# Patient Record
Sex: FEMALE | Race: WHITE | NOT HISPANIC OR LATINO | Employment: OTHER | URBAN - NONMETROPOLITAN AREA
[De-identification: names, ages, dates, MRNs, and addresses within clinical notes are randomized per-mention and may not be internally consistent; named-entity substitution may affect disease eponyms.]

---

## 2023-03-13 LAB
C REACTIVE PROTEIN (MG/L) IN SER/PLAS: 1.32 MG/DL
SEDIMENTATION RATE, ERYTHROCYTE: 27 MM/H (ref 0–30)

## 2023-04-06 ENCOUNTER — OFFICE VISIT (OUTPATIENT)
Dept: PRIMARY CARE | Facility: CLINIC | Age: 73
End: 2023-04-06
Payer: MEDICARE

## 2023-04-06 VITALS
OXYGEN SATURATION: 94 % | BODY MASS INDEX: 36.26 KG/M2 | WEIGHT: 225.6 LBS | RESPIRATION RATE: 18 BRPM | HEIGHT: 66 IN | TEMPERATURE: 97.2 F | HEART RATE: 82 BPM

## 2023-04-06 DIAGNOSIS — R05.1 ACUTE COUGH: Primary | ICD-10-CM

## 2023-04-06 PROCEDURE — 1157F ADVNC CARE PLAN IN RCRD: CPT | Performed by: NURSE PRACTITIONER

## 2023-04-06 PROCEDURE — 1159F MED LIST DOCD IN RCRD: CPT | Performed by: NURSE PRACTITIONER

## 2023-04-06 PROCEDURE — 1036F TOBACCO NON-USER: CPT | Performed by: NURSE PRACTITIONER

## 2023-04-06 PROCEDURE — 3008F BODY MASS INDEX DOCD: CPT | Performed by: NURSE PRACTITIONER

## 2023-04-06 PROCEDURE — 99213 OFFICE O/P EST LOW 20 MIN: CPT | Performed by: NURSE PRACTITIONER

## 2023-04-06 RX ORDER — GABAPENTIN 100 MG/1
300 CAPSULE ORAL 2 TIMES DAILY
COMMUNITY
Start: 2022-03-16 | End: 2023-06-26 | Stop reason: SDUPTHER

## 2023-04-06 RX ORDER — LEVOTHYROXINE SODIUM 100 UG/1
100 TABLET ORAL
COMMUNITY
End: 2023-06-26 | Stop reason: SDUPTHER

## 2023-04-06 RX ORDER — METFORMIN HYDROCHLORIDE 1000 MG/1
1000 TABLET ORAL
COMMUNITY
End: 2023-06-26 | Stop reason: SDUPTHER

## 2023-04-06 RX ORDER — BIOTIN 10 MG
1 TABLET ORAL DAILY
COMMUNITY
Start: 2017-08-14

## 2023-04-06 RX ORDER — SACUBITRIL AND VALSARTAN 24; 26 MG/1; MG/1
1 TABLET, FILM COATED ORAL 2 TIMES DAILY
COMMUNITY
End: 2023-06-26 | Stop reason: SDUPTHER

## 2023-04-06 RX ORDER — EMPAGLIFLOZIN 10 MG/1
10 TABLET, FILM COATED ORAL DAILY
COMMUNITY
End: 2023-06-26 | Stop reason: SDUPTHER

## 2023-04-06 RX ORDER — CYANOCOBALAMIN (VITAMIN B-12) 250 MCG
250 TABLET ORAL DAILY
COMMUNITY

## 2023-04-06 RX ORDER — ATORVASTATIN CALCIUM 40 MG/1
40 TABLET, FILM COATED ORAL
COMMUNITY
End: 2023-06-26 | Stop reason: SDUPTHER

## 2023-04-06 RX ORDER — FOLIC ACID 1 MG/1
1 TABLET ORAL DAILY
COMMUNITY
Start: 2018-12-13

## 2023-04-06 RX ORDER — FUROSEMIDE 20 MG/1
20 TABLET ORAL DAILY
COMMUNITY
End: 2023-10-10 | Stop reason: SDUPTHER

## 2023-04-06 RX ORDER — MELOXICAM 7.5 MG/1
1 TABLET ORAL DAILY
COMMUNITY
Start: 2021-08-24 | End: 2023-06-26 | Stop reason: SDUPTHER

## 2023-04-06 RX ORDER — PANTOPRAZOLE SODIUM 40 MG/1
40 TABLET, DELAYED RELEASE ORAL
COMMUNITY
End: 2023-06-26 | Stop reason: SDUPTHER

## 2023-04-06 RX ORDER — METOPROLOL SUCCINATE 100 MG/1
TABLET, EXTENDED RELEASE ORAL
COMMUNITY
Start: 2021-08-24 | End: 2023-06-26 | Stop reason: SDUPTHER

## 2023-04-06 RX ORDER — MULTIVITAMIN
1 TABLET ORAL DAILY
COMMUNITY
Start: 2021-06-21 | End: 2023-11-02 | Stop reason: WASHOUT

## 2023-04-06 RX ORDER — BUSPIRONE HYDROCHLORIDE 7.5 MG/1
TABLET ORAL 3 TIMES DAILY PRN
COMMUNITY
Start: 2016-10-14 | End: 2023-06-26 | Stop reason: SDUPTHER

## 2023-04-06 RX ORDER — ACETAMINOPHEN 500 MG
TABLET ORAL
COMMUNITY
End: 2023-11-02 | Stop reason: WASHOUT

## 2023-04-06 RX ORDER — HYDROXYCHLOROQUINE SULFATE 200 MG/1
TABLET, FILM COATED ORAL 2 TIMES DAILY
COMMUNITY
Start: 2015-12-21 | End: 2023-06-26 | Stop reason: SDUPTHER

## 2023-04-06 RX ORDER — ESCITALOPRAM OXALATE 20 MG/1
TABLET ORAL
COMMUNITY
Start: 2013-10-09 | End: 2023-04-24

## 2023-04-06 RX ORDER — METHOCARBAMOL 500 MG/1
TABLET, FILM COATED ORAL
COMMUNITY
End: 2023-11-02 | Stop reason: WASHOUT

## 2023-04-06 RX ORDER — ALBUTEROL SULFATE 90 UG/1
1 AEROSOL, METERED RESPIRATORY (INHALATION) EVERY 4 HOURS PRN
COMMUNITY
Start: 2019-12-02

## 2023-04-06 RX ORDER — MINERAL OIL
1 ENEMA (ML) RECTAL DAILY
COMMUNITY

## 2023-04-06 ASSESSMENT — PAIN SCALES - GENERAL: PAINLEVEL: 0-NO PAIN

## 2023-04-06 ASSESSMENT — ENCOUNTER SYMPTOMS: COUGH: 1

## 2023-04-06 NOTE — PROGRESS NOTES
"Subjective   Patient ID: Joana Elizabeth is a 73 y.o. female who presents for Cough.    Jan 17 underwent cervical spine surgery.did very well. Lost voice still hoarse.   Here today because she has a cough that is not going away. Has not been sick. Feels like she has \"fullness, gargling\" Lt mid and lower lobes. Able to cough up phlegm. Cough all has  until choke.  Cough has been present since surgery.   RT knee had infected stitch x 2 years. Dr Ortiz removed surgically.   Albuterol causes increased cough and choking. Had an old inhaler- Symbicort which helped. But it is an old.   ECHO and is SR.     Cough       Review of Systems   Respiratory:  Positive for cough.    All other systems reviewed and are negative.      Objective   Pulse 82   Temp 36.2 °C (97.2 °F)   Resp 18   Ht 1.676 m (5' 6\")   Wt 102 kg (225 lb 9.6 oz)   SpO2 94%   BMI 36.41 kg/m²     Physical Exam  Constitutional:       Appearance: Normal appearance.   Cardiovascular:      Rate and Rhythm: Normal rate and regular rhythm.   Pulmonary:      Breath sounds: Wheezing, rhonchi and rales present.      Comments: Harsh bronchial cough throughout our visit. No distress. Hoarse voice  Musculoskeletal:         General: Normal range of motion.   Neurological:      Mental Status: She is alert and oriented to person, place, and time.   Psychiatric:         Mood and Affect: Mood normal.       Assessment/Plan     Joana was seen today for cough.  Diagnoses and all orders for this visit:  Acute cough (Primary)  Comments:  i wonder if aspirating post op. Get STAT CXR. I will notify you with the result and treatment plan  Orders:  -     XR chest 4+ views; Future        "

## 2023-04-07 DIAGNOSIS — R05.1 ACUTE COUGH: Primary | ICD-10-CM

## 2023-04-07 RX ORDER — BUDESONIDE AND FORMOTEROL FUMARATE DIHYDRATE 80; 4.5 UG/1; UG/1
2 AEROSOL RESPIRATORY (INHALATION)
Qty: 1 EACH | Refills: 3 | Status: SHIPPED | OUTPATIENT
Start: 2023-04-07 | End: 2023-10-10 | Stop reason: ALTCHOICE

## 2023-04-07 NOTE — PROGRESS NOTES
CXR without acute issue. Her cough has been present since jan post cervical spine surgery. Her voice remains hoarse since surgery with slight improvement. I am concerned for vocal cord involvement, this may be temporary but I would like for her to see ENT. I reached out to dr James Resendiz who was able to schedule her for 4/17/23 in his office for eval.

## 2023-04-23 DIAGNOSIS — F32.A DEPRESSION, UNSPECIFIED DEPRESSION TYPE: Primary | ICD-10-CM

## 2023-04-24 RX ORDER — ESCITALOPRAM OXALATE 20 MG/1
TABLET ORAL
Qty: 90 TABLET | Refills: 3 | Status: SHIPPED | OUTPATIENT
Start: 2023-04-24 | End: 2023-06-26 | Stop reason: SDUPTHER

## 2023-06-26 ENCOUNTER — OFFICE VISIT (OUTPATIENT)
Dept: PRIMARY CARE | Facility: CLINIC | Age: 73
End: 2023-06-26
Payer: MEDICARE

## 2023-06-26 VITALS
BODY MASS INDEX: 40.4 KG/M2 | OXYGEN SATURATION: 98 % | HEIGHT: 63 IN | WEIGHT: 228 LBS | DIASTOLIC BLOOD PRESSURE: 84 MMHG | HEART RATE: 52 BPM | SYSTOLIC BLOOD PRESSURE: 128 MMHG | RESPIRATION RATE: 18 BRPM

## 2023-06-26 DIAGNOSIS — K21.9 GERD WITHOUT ESOPHAGITIS: ICD-10-CM

## 2023-06-26 DIAGNOSIS — E78.5 HYPERLIPIDEMIA, UNSPECIFIED HYPERLIPIDEMIA TYPE: ICD-10-CM

## 2023-06-26 DIAGNOSIS — E03.9 HYPOTHYROIDISM, UNSPECIFIED TYPE: ICD-10-CM

## 2023-06-26 DIAGNOSIS — F32.A DEPRESSION, UNSPECIFIED DEPRESSION TYPE: ICD-10-CM

## 2023-06-26 DIAGNOSIS — M47.12 CERVICAL ARTHRITIS WITH MYELOPATHY: ICD-10-CM

## 2023-06-26 DIAGNOSIS — M43.10 ACQUIRED SPONDYLOLISTHESIS: ICD-10-CM

## 2023-06-26 DIAGNOSIS — E11.8 CONTROLLED TYPE 2 DIABETES MELLITUS WITH COMPLICATION, WITHOUT LONG-TERM CURRENT USE OF INSULIN (MULTI): ICD-10-CM

## 2023-06-26 DIAGNOSIS — I15.9 SECONDARY HYPERTENSION: ICD-10-CM

## 2023-06-26 DIAGNOSIS — F41.9 ANXIETY: ICD-10-CM

## 2023-06-26 DIAGNOSIS — Z79.899 ENCOUNTER FOR MEDICATION REVIEW: Primary | ICD-10-CM

## 2023-06-26 PROCEDURE — 1036F TOBACCO NON-USER: CPT | Performed by: NURSE PRACTITIONER

## 2023-06-26 PROCEDURE — 3079F DIAST BP 80-89 MM HG: CPT | Performed by: NURSE PRACTITIONER

## 2023-06-26 PROCEDURE — 1160F RVW MEDS BY RX/DR IN RCRD: CPT | Performed by: NURSE PRACTITIONER

## 2023-06-26 PROCEDURE — 1157F ADVNC CARE PLAN IN RCRD: CPT | Performed by: NURSE PRACTITIONER

## 2023-06-26 PROCEDURE — 3008F BODY MASS INDEX DOCD: CPT | Performed by: NURSE PRACTITIONER

## 2023-06-26 PROCEDURE — 99213 OFFICE O/P EST LOW 20 MIN: CPT | Performed by: NURSE PRACTITIONER

## 2023-06-26 PROCEDURE — 1159F MED LIST DOCD IN RCRD: CPT | Performed by: NURSE PRACTITIONER

## 2023-06-26 PROCEDURE — 3074F SYST BP LT 130 MM HG: CPT | Performed by: NURSE PRACTITIONER

## 2023-06-26 RX ORDER — METFORMIN HYDROCHLORIDE 1000 MG/1
1000 TABLET ORAL
Qty: 180 TABLET | Refills: 0 | Status: SHIPPED | OUTPATIENT
Start: 2023-06-26 | End: 2024-02-19 | Stop reason: SDUPTHER

## 2023-06-26 RX ORDER — ESCITALOPRAM OXALATE 20 MG/1
20 TABLET ORAL DAILY
Qty: 90 TABLET | Refills: 0 | Status: SHIPPED | OUTPATIENT
Start: 2023-06-26 | End: 2024-02-28 | Stop reason: SDUPTHER

## 2023-06-26 RX ORDER — PANTOPRAZOLE SODIUM 40 MG/1
40 TABLET, DELAYED RELEASE ORAL
Qty: 90 TABLET | Refills: 0 | Status: SHIPPED | OUTPATIENT
Start: 2023-06-26 | End: 2023-11-29 | Stop reason: SDUPTHER

## 2023-06-26 RX ORDER — SACUBITRIL AND VALSARTAN 24; 26 MG/1; MG/1
1 TABLET, FILM COATED ORAL 2 TIMES DAILY
Qty: 180 TABLET | Refills: 0 | Status: SHIPPED | OUTPATIENT
Start: 2023-06-26

## 2023-06-26 RX ORDER — HYDROXYCHLOROQUINE SULFATE 200 MG/1
200 TABLET, FILM COATED ORAL 2 TIMES DAILY
Qty: 180 TABLET | Refills: 0 | Status: SHIPPED | OUTPATIENT
Start: 2023-06-26 | End: 2023-12-11 | Stop reason: SDUPTHER

## 2023-06-26 RX ORDER — ATORVASTATIN CALCIUM 40 MG/1
40 TABLET, FILM COATED ORAL
Qty: 90 TABLET | Refills: 0 | Status: SHIPPED | OUTPATIENT
Start: 2023-06-26 | End: 2024-02-28 | Stop reason: SDUPTHER

## 2023-06-26 RX ORDER — METOPROLOL SUCCINATE 100 MG/1
100 TABLET, EXTENDED RELEASE ORAL DAILY
Qty: 90 TABLET | Refills: 0 | Status: SHIPPED | OUTPATIENT
Start: 2023-06-26 | End: 2023-12-11 | Stop reason: SDUPTHER

## 2023-06-26 RX ORDER — EMPAGLIFLOZIN 10 MG/1
10 TABLET, FILM COATED ORAL DAILY
Qty: 90 TABLET | Refills: 0 | Status: SHIPPED | OUTPATIENT
Start: 2023-06-26

## 2023-06-26 RX ORDER — MELOXICAM 7.5 MG/1
7.5 TABLET ORAL DAILY
Qty: 90 TABLET | Refills: 0 | Status: SHIPPED | OUTPATIENT
Start: 2023-06-26 | End: 2023-10-30 | Stop reason: SDUPTHER

## 2023-06-26 RX ORDER — GABAPENTIN 100 MG/1
300 CAPSULE ORAL 2 TIMES DAILY
Qty: 180 CAPSULE | Refills: 0 | Status: SHIPPED | OUTPATIENT
Start: 2023-06-26 | End: 2023-11-06 | Stop reason: DRUGHIGH

## 2023-06-26 RX ORDER — LEVOTHYROXINE SODIUM 100 UG/1
100 TABLET ORAL
Qty: 90 TABLET | Refills: 0 | Status: SHIPPED | OUTPATIENT
Start: 2023-06-26 | End: 2023-11-29 | Stop reason: SDUPTHER

## 2023-06-26 RX ORDER — BUSPIRONE HYDROCHLORIDE 7.5 MG/1
7.5 TABLET ORAL 3 TIMES DAILY PRN
Qty: 270 TABLET | Refills: 0 | Status: SHIPPED | OUTPATIENT
Start: 2023-06-26 | End: 2023-10-10 | Stop reason: ALTCHOICE

## 2023-06-26 NOTE — PROGRESS NOTES
"Subjective   Patient ID: Joana Elizabeth is a 73 y.o. female who presents for Med Refill.    Received the letter from [facility] that I was \"leaving \" and she wanted ot discuss. I explained this office is permanently closing and I have chosen to take a position outside of . She is upset and does not know where to go  Need refills and cannot tell me which one. She had a written list that we both went over.   Some go to [facility] others go to [facility].   Asking who she should see as a PCP.  I gave her some names of PCP through   Also updated me that she is in the process of getting an MRI of cervical spine and LT shoulder by Dr Shrestha and Dr Hawkins. She is having worsening chronic pain LT shoulder and numbness in the hands.   Occasional pain RT lateral side/flank starts in spine and radiates to the front of the abdomen. Is right handed. Does not use the LT UE nasra due to pain. Wondering if she \"pulled a muscle\". We also discussed her arthritis-inflammatory which can cause these S&S    Med Refill         Review of Systems   All other systems reviewed and are negative.      Objective   /84   Pulse 52   Resp 18   Ht 1.6 m (5' 3\")   Wt 103 kg (228 lb)   SpO2 98%   BMI 40.39 kg/m²     Physical Exam  Constitutional:       Appearance: She is obese.   Cardiovascular:      Rate and Rhythm: Normal rate.   Pulmonary:      Effort: Pulmonary effort is normal.      Breath sounds: Normal breath sounds.   Abdominal:      General: Bowel sounds are normal.      Palpations: Abdomen is soft.   Musculoskeletal:      Comments: Decreased ROM LT UE cannot raise arm above the head.    Neurological:      Mental Status: She is alert and oriented to person, place, and time.   Psychiatric:         Mood and Affect: Mood normal.         Behavior: Behavior normal.         Assessment/Plan     Joana was seen today for med refill.  Diagnoses and all orders for this visit:  Encounter for medication review (Primary)  Comments:  all meds " refilled 90 days. She will call to schedule with a new PCP.  Depression, unspecified depression type  -     escitalopram (Lexapro) 20 mg tablet; Take 1 tablet (20 mg) by mouth once daily.  Acquired spondylolisthesis  -     gabapentin (Neurontin) 100 mg capsule; Take 3 capsules (300 mg) by mouth 2 times a day.  -     meloxicam (Mobic) 7.5 mg tablet; Take 1 tablet (7.5 mg) by mouth once daily.  -     hydroxychloroquine (Plaquenil) 200 mg tablet; Take 1 tablet (200 mg) by mouth 2 times a day.  Hyperlipidemia, unspecified hyperlipidemia type  -     atorvastatin (Lipitor) 40 mg tablet; Take 1 tablet (40 mg) by mouth once daily.  -     Entresto 24-26 mg tablet; Take 1 tablet by mouth 2 times a day.  Anxiety  -     busPIRone (Buspar) 7.5 mg tablet; Take 1 tablet (7.5 mg) by mouth 3 times a day as needed (270).  Hypothyroidism, unspecified type  -     levothyroxine (Synthroid, Levoxyl) 100 mcg tablet; Take 1 tablet (100 mcg) by mouth once daily.  Controlled type 2 diabetes mellitus with complication, without long-term current use of insulin (CMS/[facility])  -     metFORMIN (Glucophage) 1,000 mg tablet; Take 1 tablet (1,000 mg) by mouth 2 times a day with meals.  -     Jardiance 10 mg; Take 1 tablet (10 mg) by mouth once daily.  GERD without esophagitis  -     pantoprazole (ProtoNix) 40 mg EC tablet; Take 1 tablet (40 mg) by mouth once daily.  Secondary hypertension  -     metoprolol succinate XL (Toprol-XL) 100 mg 24 hr tablet; Take 1 tablet (100 mg) by mouth once daily.  -     Entresto 24-26 mg tablet; Take 1 tablet by mouth 2 times a day.  Cervical arthritis with myelopathy  -     gabapentin (Neurontin) 100 mg capsule; Take 3 capsules (300 mg) by mouth 2 times a day.  -     meloxicam (Mobic) 7.5 mg tablet; Take 1 tablet (7.5 mg) by mouth once daily.  -     hydroxychloroquine (Plaquenil) 200 mg tablet; Take 1 tablet (200 mg) by mouth 2 times a day.

## 2023-08-17 ENCOUNTER — HOSPITAL ENCOUNTER (OUTPATIENT)
Dept: DATA CONVERSION | Facility: HOSPITAL | Age: 73
End: 2023-08-17
Attending: ORTHOPAEDIC SURGERY
Payer: MEDICARE

## 2023-08-17 DIAGNOSIS — S43.492A OTHER SPRAIN OF LEFT SHOULDER JOINT, INITIAL ENCOUNTER: ICD-10-CM

## 2023-08-17 DIAGNOSIS — M19.012 PRIMARY OSTEOARTHRITIS, LEFT SHOULDER: ICD-10-CM

## 2023-08-17 DIAGNOSIS — M75.102 UNSPECIFIED ROTATOR CUFF TEAR OR RUPTURE OF LEFT SHOULDER, NOT SPECIFIED AS TRAUMATIC: ICD-10-CM

## 2023-09-20 PROBLEM — I10 BENIGN ESSENTIAL HTN: Status: ACTIVE | Noted: 2023-09-20

## 2023-09-20 PROBLEM — M05.20 RHEUMATOID ARTERITIS (MULTI): Status: ACTIVE | Noted: 2017-03-22

## 2023-09-20 PROBLEM — I49.3 PVC (PREMATURE VENTRICULAR CONTRACTION): Status: ACTIVE | Noted: 2023-09-20

## 2023-09-20 PROBLEM — I42.9 CARDIOMYOPATHY (MULTI): Status: ACTIVE | Noted: 2023-09-20

## 2023-09-20 PROBLEM — E55.9 MILD VITAMIN D DEFICIENCY: Status: ACTIVE | Noted: 2023-09-20

## 2023-09-20 PROBLEM — J38.00 VOCAL CORD WEAKNESS: Status: ACTIVE | Noted: 2023-09-20

## 2023-09-20 PROBLEM — E11.9 TYPE 2 DIABETES MELLITUS (MULTI): Status: ACTIVE | Noted: 2023-09-20

## 2023-09-20 PROBLEM — K21.00 GERD WITH ESOPHAGITIS: Status: ACTIVE | Noted: 2023-09-20

## 2023-09-20 PROBLEM — M79.7 FIBROMYALGIA: Status: ACTIVE | Noted: 2023-09-20

## 2023-09-20 PROBLEM — H18.833 RECURRENT EROSION OF CORNEA, BILATERAL: Status: ACTIVE | Noted: 2019-11-25

## 2023-09-20 PROBLEM — H43.811 VITREOUS DEGENERATION, RIGHT EYE: Status: ACTIVE | Noted: 2018-01-30

## 2023-09-20 PROBLEM — M26.649 TMJ ARTHRITIS: Status: ACTIVE | Noted: 2023-09-20

## 2023-09-20 PROBLEM — H65.90 MIDDLE EAR EFFUSION: Status: ACTIVE | Noted: 2023-09-20

## 2023-09-20 PROBLEM — Z98.890 STATUS POST LUMBAR SPINE SURGERY FOR DECOMPRESSION OF SPINAL CORD: Status: ACTIVE | Noted: 2018-11-06

## 2023-09-20 PROBLEM — M47.816 LUMBAR SPONDYLOSIS: Status: ACTIVE | Noted: 2018-09-28

## 2023-09-20 PROBLEM — I82.409 DEEP VENOUS THROMBOSIS (MULTI): Status: ACTIVE | Noted: 2023-09-20

## 2023-09-20 PROBLEM — I51.9 LV DYSFUNCTION: Status: ACTIVE | Noted: 2023-09-20

## 2023-09-20 PROBLEM — H52.4 PRESBYOPIA: Status: ACTIVE | Noted: 2019-11-25

## 2023-09-20 PROBLEM — M79.10 MYALGIA: Status: ACTIVE | Noted: 2023-09-20

## 2023-09-20 PROBLEM — J45.909 ASTHMA (HHS-HCC): Status: ACTIVE | Noted: 2023-09-20

## 2023-09-20 PROBLEM — G89.29 CHRONIC PAIN: Status: ACTIVE | Noted: 2023-09-20

## 2023-09-20 PROBLEM — G47.33 OSA ON CPAP: Status: ACTIVE | Noted: 2023-09-20

## 2023-09-20 PROBLEM — M17.0 OSTEOARTHRITIS OF BOTH KNEES: Status: ACTIVE | Noted: 2023-09-20

## 2023-09-20 PROBLEM — M06.4 UNDIFFERENTIATED INFLAMMATORY ARTHRITIS (MULTI): Status: ACTIVE | Noted: 2023-09-20

## 2023-09-20 PROBLEM — H57.03: Status: ACTIVE | Noted: 2023-09-20

## 2023-09-20 PROBLEM — M48.02 CERVICAL STENOSIS OF SPINE: Status: ACTIVE | Noted: 2023-09-20

## 2023-09-20 PROBLEM — R07.9 CHEST PAIN: Status: ACTIVE | Noted: 2017-01-04

## 2023-09-20 PROBLEM — R06.02 EXERTIONAL SHORTNESS OF BREATH: Status: ACTIVE | Noted: 2023-09-20

## 2023-09-20 PROBLEM — Z95.810 ICD (IMPLANTABLE CARDIOVERTER-DEFIBRILLATOR) IN PLACE: Status: ACTIVE | Noted: 2023-09-20

## 2023-09-20 PROBLEM — R49.0 HOARSENESS: Status: ACTIVE | Noted: 2023-09-20

## 2023-09-20 PROBLEM — G95.20 COMPRESSION OF SPINAL CORD (MULTI): Status: ACTIVE | Noted: 2023-09-20

## 2023-09-20 PROBLEM — J40 BRONCHITIS: Status: ACTIVE | Noted: 2023-09-20

## 2023-09-20 PROBLEM — M19.90 UNDIFFERENTIATED INFLAMMATORY ARTHRITIS: Status: ACTIVE | Noted: 2023-09-20

## 2023-09-20 RX ORDER — CIPROFLOXACIN HYDROCHLORIDE 3 MG/ML
SOLUTION/ DROPS OPHTHALMIC
COMMUNITY
End: 2023-11-02 | Stop reason: WASHOUT

## 2023-09-20 RX ORDER — PRENATAL VIT CALC,IRON,FOLIC
1 TABLET ORAL DAILY
COMMUNITY
Start: 2017-08-14 | End: 2023-10-10 | Stop reason: ALTCHOICE

## 2023-09-20 RX ORDER — METOPROLOL SUCCINATE 50 MG/1
1 TABLET, EXTENDED RELEASE ORAL DAILY
COMMUNITY
End: 2023-11-02 | Stop reason: WASHOUT

## 2023-09-20 RX ORDER — ALBUTEROL SULFATE 90 UG/1
2 AEROSOL, METERED RESPIRATORY (INHALATION)
COMMUNITY
End: 2023-11-02 | Stop reason: WASHOUT

## 2023-09-20 RX ORDER — PHENYLEPHRINE HCL 10 MG/1
10 TABLET, FILM COATED ORAL 2 TIMES DAILY PRN
COMMUNITY
End: 2023-11-02 | Stop reason: WASHOUT

## 2023-09-20 RX ORDER — ACETAMINOPHEN 160 MG/5ML
1 SUSPENSION, ORAL (FINAL DOSE FORM) ORAL DAILY
COMMUNITY
Start: 2022-09-19

## 2023-09-20 RX ORDER — VITAMIN B COMPLEX
1 CAPSULE ORAL
COMMUNITY
End: 2023-10-10 | Stop reason: ALTCHOICE

## 2023-09-20 RX ORDER — ONDANSETRON 4 MG/1
4 TABLET, FILM COATED ORAL EVERY 8 HOURS PRN
COMMUNITY
Start: 2021-08-18 | End: 2023-11-02 | Stop reason: WASHOUT

## 2023-09-20 RX ORDER — NAPROXEN 500 MG/1
500 TABLET ORAL
COMMUNITY
Start: 2014-04-27 | End: 2023-11-02 | Stop reason: WASHOUT

## 2023-09-20 RX ORDER — OXYCODONE HYDROCHLORIDE 5 MG/1
TABLET ORAL
COMMUNITY
Start: 2023-01-19 | End: 2023-11-02 | Stop reason: WASHOUT

## 2023-09-20 RX ORDER — FEXOFENADINE HCL 60 MG
60 TABLET ORAL DAILY
COMMUNITY
Start: 2010-08-23 | End: 2023-11-02 | Stop reason: WASHOUT

## 2023-09-20 RX ORDER — CLOTRIMAZOLE AND BETAMETHASONE DIPROPIONATE 10; .64 MG/G; MG/G
CREAM TOPICAL
COMMUNITY
End: 2023-11-02 | Stop reason: WASHOUT

## 2023-09-20 RX ORDER — FERROUS SULFATE 325(65) MG
65 TABLET ORAL 2 TIMES DAILY
COMMUNITY

## 2023-09-20 RX ORDER — FLUTICASONE PROPIONATE 50 MCG
1 SPRAY, SUSPENSION (ML) NASAL DAILY
COMMUNITY

## 2023-09-20 RX ORDER — CEFUROXIME AXETIL 500 MG/1
1 TABLET ORAL 2 TIMES DAILY
COMMUNITY
Start: 2017-12-22 | End: 2023-10-10 | Stop reason: ALTCHOICE

## 2023-09-20 RX ORDER — TOBRAMYCIN AND DEXAMETHASONE 3; 1 MG/ML; MG/ML
SUSPENSION/ DROPS OPHTHALMIC
COMMUNITY
End: 2023-11-02 | Stop reason: WASHOUT

## 2023-09-20 RX ORDER — CHLORHEXIDINE GLUCONATE 40 MG/ML
SOLUTION TOPICAL
COMMUNITY
Start: 2023-01-05 | End: 2023-11-02 | Stop reason: WASHOUT

## 2023-09-20 RX ORDER — NAPROXEN SODIUM 220 MG/1
1 TABLET, FILM COATED ORAL DAILY
COMMUNITY
Start: 2021-08-03 | End: 2023-10-19 | Stop reason: SDUPTHER

## 2023-09-20 RX ORDER — ACETAMINOPHEN 500 MG
TABLET ORAL
COMMUNITY

## 2023-09-20 RX ORDER — CHLORHEXIDINE GLUCONATE ORAL RINSE 1.2 MG/ML
SOLUTION DENTAL
COMMUNITY
Start: 2023-01-05 | End: 2023-11-02 | Stop reason: WASHOUT

## 2023-09-20 RX ORDER — ERGOCALCIFEROL 1.25 MG/1
50000 CAPSULE ORAL WEEKLY
COMMUNITY
End: 2023-11-02 | Stop reason: WASHOUT

## 2023-09-20 RX ORDER — AZELASTINE HYDROCHLORIDE 0.5 MG/ML
1 SOLUTION/ DROPS OPHTHALMIC 2 TIMES DAILY
COMMUNITY
Start: 2018-09-04 | End: 2023-10-10 | Stop reason: ALTCHOICE

## 2023-09-20 RX ORDER — CALCIUM CARBONATE 300MG(750)
2 TABLET,CHEWABLE ORAL DAILY
COMMUNITY
Start: 2022-09-19

## 2023-09-20 RX ORDER — METHOCARBAMOL 500 MG/1
500 TABLET, FILM COATED ORAL 3 TIMES DAILY
COMMUNITY
End: 2023-11-15 | Stop reason: HOSPADM

## 2023-09-20 RX ORDER — HYDROCODONE BITARTRATE AND ACETAMINOPHEN 5; 325 MG/1; MG/1
1 TABLET ORAL EVERY 6 HOURS PRN
COMMUNITY
End: 2023-11-02 | Stop reason: WASHOUT

## 2023-09-20 RX ORDER — BIOTIN 5 MG
TABLET ORAL
COMMUNITY
End: 2023-11-02 | Stop reason: WASHOUT

## 2023-09-20 RX ORDER — METOPROLOL SUCCINATE 25 MG/1
25 TABLET, EXTENDED RELEASE ORAL
COMMUNITY
Start: 2021-08-24 | End: 2023-11-02 | Stop reason: WASHOUT

## 2023-09-20 RX ORDER — MECOBALAMIN 5000 MCG
5000 LOZENGE ORAL DAILY
COMMUNITY
End: 2023-11-02 | Stop reason: WASHOUT

## 2023-09-20 RX ORDER — ACETAMINOPHEN 325 MG/1
650 TABLET ORAL EVERY 6 HOURS
COMMUNITY
End: 2023-11-14 | Stop reason: WASHOUT

## 2023-09-20 RX ORDER — PRAVASTATIN SODIUM 40 MG/1
40 TABLET ORAL DAILY
COMMUNITY
End: 2023-11-02 | Stop reason: ALTCHOICE

## 2023-09-20 RX ORDER — PHENOL 1.4 %
1 AEROSOL, SPRAY (ML) MUCOUS MEMBRANE DAILY
COMMUNITY
End: 2023-11-02 | Stop reason: WASHOUT

## 2023-09-20 RX ORDER — DIAPER,BRIEF,ADULT, DISPOSABLE
EACH MISCELLANEOUS
COMMUNITY
Start: 2014-07-02

## 2023-09-20 RX ORDER — PREDNISOLONE ACETATE 10 MG/ML
SUSPENSION/ DROPS OPHTHALMIC
COMMUNITY
End: 2023-11-02 | Stop reason: WASHOUT

## 2023-09-20 RX ORDER — LINAGLIPTIN 5 MG/1
TABLET, FILM COATED ORAL
COMMUNITY
Start: 2018-09-10 | End: 2023-11-02 | Stop reason: WASHOUT

## 2023-09-20 RX ORDER — CALCIUM CARBONATE/VITAMIN D3 600MG-5MCG
TABLET ORAL
COMMUNITY
End: 2023-10-10 | Stop reason: ALTCHOICE

## 2023-09-20 RX ORDER — ESCITALOPRAM OXALATE 10 MG/1
10 TABLET ORAL NIGHTLY
COMMUNITY
Start: 2010-08-23 | End: 2023-10-10 | Stop reason: ALTCHOICE

## 2023-09-20 RX ORDER — FLUOCINOLONE ACETONIDE 0.1 MG/G
CREAM TOPICAL
COMMUNITY
End: 2023-11-02 | Stop reason: WASHOUT

## 2023-09-20 RX ORDER — DICLOFENAC SODIUM AND MISOPROSTOL 75; 200 MG/1; UG/1
1 TABLET, DELAYED RELEASE ORAL 2 TIMES DAILY
COMMUNITY
End: 2023-11-02 | Stop reason: WASHOUT

## 2023-09-20 RX ORDER — BUSPIRONE HYDROCHLORIDE 7.5 MG/1
7.5 TABLET ORAL DAILY PRN
COMMUNITY
End: 2023-11-14 | Stop reason: ALTCHOICE

## 2023-09-20 RX ORDER — GABAPENTIN 400 MG/1
400 CAPSULE ORAL 3 TIMES DAILY
COMMUNITY
Start: 2018-08-08 | End: 2023-11-02 | Stop reason: WASHOUT

## 2023-09-25 RX ORDER — DOXYCYCLINE 100 MG/1
1 TABLET ORAL 2 TIMES DAILY
COMMUNITY
Start: 2023-03-17 | End: 2023-11-02 | Stop reason: WASHOUT

## 2023-09-25 RX ORDER — POLYETHYLENE GLYCOL 3350 17 G/17G
POWDER, FOR SOLUTION ORAL
COMMUNITY
Start: 2023-01-18 | End: 2023-11-02 | Stop reason: WASHOUT

## 2023-10-03 ENCOUNTER — OFFICE VISIT (OUTPATIENT)
Dept: ORTHOPEDIC SURGERY | Facility: CLINIC | Age: 73
End: 2023-10-03
Payer: MEDICARE

## 2023-10-03 DIAGNOSIS — M25.812 IMPINGEMENT OF LEFT SHOULDER: Primary | ICD-10-CM

## 2023-10-03 PROCEDURE — 3052F HG A1C>EQUAL 8.0%<EQUAL 9.0%: CPT | Performed by: ORTHOPAEDIC SURGERY

## 2023-10-03 PROCEDURE — 99213 OFFICE O/P EST LOW 20 MIN: CPT | Mod: 25 | Performed by: ORTHOPAEDIC SURGERY

## 2023-10-03 PROCEDURE — 20611 DRAIN/INJ JOINT/BURSA W/US: CPT | Performed by: ORTHOPAEDIC SURGERY

## 2023-10-03 PROCEDURE — 1160F RVW MEDS BY RX/DR IN RCRD: CPT | Performed by: ORTHOPAEDIC SURGERY

## 2023-10-03 PROCEDURE — 1036F TOBACCO NON-USER: CPT | Performed by: ORTHOPAEDIC SURGERY

## 2023-10-03 PROCEDURE — 3008F BODY MASS INDEX DOCD: CPT | Performed by: ORTHOPAEDIC SURGERY

## 2023-10-03 PROCEDURE — 1126F AMNT PAIN NOTED NONE PRSNT: CPT | Performed by: ORTHOPAEDIC SURGERY

## 2023-10-03 PROCEDURE — 20611 DRAIN/INJ JOINT/BURSA W/US: CPT | Performed by: PHYSICIAN ASSISTANT

## 2023-10-03 PROCEDURE — 2500000004 HC RX 250 GENERAL PHARMACY W/ HCPCS (ALT 636 FOR OP/ED): Performed by: ORTHOPAEDIC SURGERY

## 2023-10-03 PROCEDURE — 3048F LDL-C <100 MG/DL: CPT | Performed by: ORTHOPAEDIC SURGERY

## 2023-10-03 PROCEDURE — 1159F MED LIST DOCD IN RCRD: CPT | Performed by: ORTHOPAEDIC SURGERY

## 2023-10-03 PROCEDURE — 99213 OFFICE O/P EST LOW 20 MIN: CPT | Performed by: ORTHOPAEDIC SURGERY

## 2023-10-03 RX ORDER — TRIAMCINOLONE ACET/0.9%NACL/PF 40 MG/ML
10 VIAL (ML) INJECTION ONCE
Status: DISCONTINUED | OUTPATIENT
Start: 2023-10-03 | End: 2023-10-03

## 2023-10-03 RX ADMIN — TRIAMCINOLONE ACETONIDE 10 MG: 10 INJECTION, SUSPENSION INTRA-ARTICULAR; INTRALESIONAL at 10:41

## 2023-10-03 ASSESSMENT — PAIN SCALES - GENERAL: PAINLEVEL_OUTOF10: 5 - MODERATE PAIN

## 2023-10-03 ASSESSMENT — PAIN - FUNCTIONAL ASSESSMENT: PAIN_FUNCTIONAL_ASSESSMENT: 0-10

## 2023-10-03 ASSESSMENT — ENCOUNTER SYMPTOMS
APPETITE CHANGE: 0
JOINT SWELLING: 1
COLOR CHANGE: 0
TROUBLE SWALLOWING: 0
EYE DISCHARGE: 0
SHORTNESS OF BREATH: 0
WHEEZING: 0

## 2023-10-03 NOTE — PROGRESS NOTES
Reason for Appointment  Chief Complaint   Patient presents with    Left Shoulder - Pain       History of Present Illness  Patient is a 73 y.o. female here today for follow-up evaluation of recurrent left shoulder pain.  She had a subacromial injection about 6 weeks ago that did give her some relief, she is having recurrent lateral sided pain and she is getting some radicular symptoms down the arm.  She is following up with Dr. Hawkins for her neck, she may have some hardware loosening in her neck and may need revision neck surgery in the coming future.  We have discussed a possible reverse shoulder replacement in the future but she is not interested in this today.  No other changes in her past medical history, allergies, or medications..     Past Medical History:   Diagnosis Date    Abnormal levels of other serum enzymes 12/01/2020    Abnormal AST and ALT    Acquired absence of other specified parts of digestive tract 07/10/2019    Status post laparoscopic cholecystectomy    Anemia, unspecified 12/28/2018    Postoperative anemia    Body mass index (BMI) 36.0-36.9, adult 12/16/2019    BMI 36.0-36.9,adult    Body mass index (BMI) 37.0-37.9, adult 05/30/2019    BMI 37.0-37.9, adult    Calculus of gallbladder without cholecystitis without obstruction 06/26/2019    Gallstones    Cellulitis of face 08/21/2017    Cellulitis of face    Chronic maxillary sinusitis 03/22/2018    Right maxillary sinusitis    Contact with and (suspected) exposure to covid-19 09/08/2020    Suspected COVID-19 virus infection    COVID-19 10/02/2020    COVID-19 virus infection    Disorder of bone, unspecified 01/26/2021    Disorder of bone and cartilage    Dorsalgia, unspecified 10/15/2019    Mid-back pain, acute    Effusion, unspecified hand 10/14/2016    Swelling of hand joint    Elevated erythrocyte sedimentation rate 11/17/2014    Elevated sedimentation rate    Encounter for other preprocedural examination 08/19/2021    Pre-op evaluation     Encounter for screening for malignant neoplasm of cervix 05/04/2016    Cervical cancer screening    Encounter for screening for malignant neoplasm of colon 09/21/2018    Encounter for screening colonoscopy    Encounter for screening for osteoporosis 09/01/2020    Screening for osteoporosis    Epigastric pain 11/17/2014    Dyspepsia    Essential (primary) hypertension 08/19/2021    Benign essential HTN    Fever, unspecified 09/08/2020    Fever with exposure to COVID-19 virus    Gastroparesis 11/19/2019    Gastroparesis    Gastroparesis 12/05/2019    Gastroparesis    History of falling 08/04/2021    Status post fall    Laceration without foreign body of other part of head, initial encounter 03/22/2018    Forehead laceration    Left lower quadrant abdominal tenderness 06/11/2018    LLQ abdominal tenderness    Localized edema 05/01/2018    Leg edema    Occipital neuralgia 02/10/2017    Occipital neuralgia of right side    Osteomyelitis, unspecified (CMS/HCC) 12/16/2019    Knee osteomyelits, right    Other amnesia 09/01/2020    Memory problem    Other conditions influencing health status     Screening for malignant neoplasms, colon    Other general symptoms and signs 08/14/2017    Forgetfulness    Other intervertebral disc displacement, lumbar region 04/23/2020    Disc displacement, lumbar    Other long term (current) drug therapy 04/02/2020    Long-term use of Plaquenil    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    Postherpetic neuralgia    Other postprocedural complications and disorders of digestive system 12/16/2019    Postcholecystectomy diarrhea    Other specified disorders of kidney and ureter 05/30/2019    Left renal mass    Pain in left shoulder 05/11/2020    Pain in left shoulder    Pain in left shoulder 04/17/2018    Acute pain of left shoulder    Pain in right knee 04/23/2020    Bilateral knee pain    Pain in right lower leg 01/20/2016    Calf pain, right    Pain in unspecified hand  04/26/2021    Hand pain    Pain in unspecified shoulder 01/13/2017    Shoulder pain    Pelvic and perineal pain 05/04/2016    Pelvic cramping    Periumbilical pain 10/02/2020    Umbilical pain    Personal history of diseases of the skin and subcutaneous tissue 09/01/2020    History of eczema    Personal history of other diseases of the circulatory system 09/28/2021    History of cardiomyopathy    Personal history of other diseases of the circulatory system 08/03/2021    History of carotid artery stenosis    Personal history of other diseases of the digestive system 10/21/2019    History of gastritis    Personal history of other diseases of the digestive system 06/26/2019    History of biliary colic    Personal history of other diseases of the nervous system and sense organs 05/11/2020    History of sleep apnea    Personal history of other diseases of the nervous system and sense organs 12/08/2021    History of acute otitis media    Personal history of other diseases of the respiratory system 11/07/2016    History of acute bronchitis    Personal history of other diseases of urinary system 05/04/2016    History of hematuria    Personal history of other endocrine, nutritional and metabolic disease 04/26/2016    History of hyperglycemia    Personal history of other infectious and parasitic diseases     History of scarlet fever    Personal history of other infectious and parasitic diseases 09/21/2018    History of candidiasis of vagina    Personal history of other infectious and parasitic diseases 07/08/2019    History of surgical site infection    Personal history of other infectious and parasitic diseases 08/21/2017    History of herpes zoster    Personal history of other infectious and parasitic diseases 02/13/2018    History of surgical site infection    Personal history of other medical treatment 12/16/2019    History of screening mammography    Personal history of other mental and behavioral disorders 08/15/2016     History of depression    Personal history of other specified conditions 06/04/2014    History of headache    Personal history of other specified conditions 05/11/2020    History of snoring    Personal history of other specified conditions 01/05/2017    History of chest pain    Personal history of other specified conditions 11/25/2015    History of dysphagia    Personal history of other specified conditions 11/25/2015    History of right flank pain    Personal history of other specified conditions 11/25/2015    History of neck swelling    Personal history of other specified conditions 12/16/2019    History of diarrhea    Personal history of other specified conditions 08/04/2021    History of dizziness    Personal history of other specified conditions 05/15/2018    History of balance disorder    Personal history of other specified conditions 10/12/2017    History of fever    Personal history of urinary (tract) infections 05/04/2016    History of urinary tract infection    Pneumonia, unspecified organism 03/29/2019    Left lower lobe pneumonia    Radiculopathy, cervical region 02/17/2020    Cervical radiculopathy    Radiculopathy, lumbar region 02/17/2020    Lumbar radiculitis    Repeated falls 04/17/2018    Recurrent falls    Right upper quadrant pain 12/01/2020    Chronic RUQ pain    Right upper quadrant pain 06/26/2019    Right upper quadrant abdominal pain    Spondylosis without myelopathy or radiculopathy, cervical region 08/01/2017    Spondylosis of cervical region without myelopathy or radiculopathy    Spondylosis without myelopathy or radiculopathy, lumbar region 02/17/2020    Lumbar spondylosis    Stricture of artery (CMS/HCC) 08/03/2021    Subclavian artery stenosis, right    Trigger finger, right index finger 07/30/2021    Trigger index finger of right hand    Trigger finger, right ring finger 07/30/2021    Trigger ring finger of right hand    Trigger finger, unspecified finger 04/26/2021    Trigger finger,  acquired    Unspecified asthma, uncomplicated 2020    Asthmatic bronchitis    Unspecified foreign body in larynx causing other injury, initial encounter 2015    Choking    Unspecified nonsuppurative otitis media, bilateral 2019    Bilateral serous otitis media    Unspecified otitis externa, left ear 2017    External otitis of left ear       Past Surgical History:   Procedure Laterality Date    BACK SURGERY  10/12/2017    Back Surgery    BACK SURGERY  2017    Lower Back Surgery     SECTION, CLASSIC  2013     Section    CT GUIDED PERCUTANEOUS BIOPSY MEDIASTINUM  2017    CT GUIDED PERCUTANEOUS BIOPSY MEDIASTINUM 2017 [facility]    DILATION AND CURETTAGE OF UTERUS  2013    Dilation And Curettage    OTHER SURGICAL HISTORY  2019    Cholecystectomy    OTHER SURGICAL HISTORY  2019    Knee arthroscopy    OTHER SURGICAL HISTORY  2019    Colonoscopy    OTHER SURGICAL HISTORY  2019    Esophagogastroduodenoscopy    TONSILLECTOMY  2013    Tonsillectomy    TUBAL LIGATION  2013    Tubal Ligation       Medication Documentation Review Audit       Reviewed by Shraddha Lynch MA (Medical Assistant) on 10/03/23 at 1011      Medication Order Taking? Sig Documenting Provider Last Dose Status   acetaminophen (Tylenol) 325 mg tablet [id number] Yes Take 2 tablets (650 mg) by mouth every 6 hours. Historical Provider, MD Taking Active   albuterol 90 mcg/actuation inhaler [id number] No Inhale 1 puff every 4 hours if needed. Historical Provider, MD Not Taking Active   albuterol 90 mcg/actuation inhaler [id number] Yes Inhale 2 puffs. as instructed. Historical Provider, MD Taking Active   aspirin 81 mg capsule [id number] Yes Take 1 tablet by mouth once daily. CHEW AND SWALLOW Historical Provider, MD Taking Active   aspirin 81 mg chewable tablet [id number] No Chew 1 tablet (81 mg) once daily. Historical Provider, MD Not Taking Active    atorvastatin (Lipitor) 40 mg tablet [id number] Yes Take 1 tablet (40 mg) by mouth once daily. GEOFFREY Go-CNP Taking Active   azelastine (Optivar) 0.05 % ophthalmic solution [id number] No Administer 1 drop into both eyes 2 times a day. Historical Provider, MD Not Taking Active   azithromycin (Azasite) 1 % ophthalmic solution [id number] No Azasite 1 % eye drops Historical Provider, MD Not Taking Active   b complex vitamins capsule [id number]  Take 1 capsule by mouth once daily. Historical Provider, MD  Active   biotin 10 mg tablet [id number] Yes Take 1 tablet (10 mg) by mouth once daily. Historical Provider, MD Taking Active   biotin 5 mg tablet [id number] Yes Take by mouth. Historical Provider, MD Taking Active   blood sugar diagnostic (Truetrack Test) strip [id number] Yes TEST twice a day Historical Provider, MD Taking Active   budesonide-formoteroL (Symbicort) 80-4.5 mcg/actuation inhaler [id number] Yes Inhale 2 puffs  in the morning and 2 puffs before bedtime. Rinse mouth with water after use to reduce aftertaste and incidence of candidiasis. Do not swallow.. Thuy Garcia APRN-CNP Taking Active   busPIRone (Buspar) 7.5 mg tablet [id number] No Take 1 tablet (7.5 mg) by mouth 3 times a day as needed (270).   Patient not taking: Reported on 10/3/2023    GEOFFREY Go-CNP Not Taking Active   busPIRone (Buspar) 7.5 mg tablet [id number] Yes Take 1 tablet (7.5 mg) by mouth twice a day. Historical Provider, MD Taking Active   calcium carbonate-vitamin D3 600 mg-20 mcg (800 unit) tablet [id number] Yes Caltrate with Vitamin D3 600 mg (1,500 mg)-800 unit tablet Historical Provider, MD Taking Active   calcium carbonate-vitamin D3 600 mg-5 mcg (200 unit) tablet [id number] No Take by mouth. Historical Provider, MD Not Taking Active   calcium citrate-vitamin D3 200 mg-6.25 mcg (250 unit) tablet [id number] No Take 1 tablet by mouth once daily. Historical Provider, MD Not Taking Active   cefuroxime (Ceftin) 500 mg tablet [id number] No  Take 1 tablet (500 mg) by mouth twice a day. Historical MD Kaye Not Taking Active   chlorhexidine (Hibiclens) 4 % external liquid [id number] No Use ad directed for preoperative shower Historical MD Kaye Not Taking Active   chlorhexidine (Peridex) 0.12 % solution [id number] No USE TO RINSE MOUTH WITH WITH 15ML (1 CAPFUL) FOR 30 SECONDS IN THE MORNING AND EVENING AFTER TOOTHBRUSHING. EXPECTORATE AFTER RINSING. DO NOT SWALLOW. Historical MD Kaye Not Taking Active   cholecalciferol (Vitamin D-3) 50 mcg (2,000 unit) capsule [id number] Yes Vitamin D3 2,000 unit capsule Historical MD Kaye Taking Active   ciprofloxacin (Ciloxan) 0.3 % ophthalmic solution [id number] No ciprofloxacin 0.3 % eye drops Stephanie Restrepo MD Not Taking Active   clotrimazole-betamethasone (Lotrisone) cream [id number] No clotrimazole-betamethasone 1 %-0.05 % topical cream Historical MD Kaye Not Taking Active   coenzyme Q-10 200 mg capsule [id number] Yes Take 1 capsule (200 mg) by mouth once daily. Historical Provider, MD Taking Active   cyanocobalamin (Vitamin B-12) 250 mcg tablet [id number] Yes Take by mouth. Historical Provider, MD Taking Active   diclofenac sodium/misoprostol (ARTHROTEC 75 ORAL) [id number] No Take by mouth 2 times a day. Historical MD Kaye Not Taking Active   diclofenac-misoprostoL (Arthrotec 75)  mg-mcg EC tablet [id number] No Take 1 tablet by mouth 2 times a day. Stephanie Restrepo MD Not Taking Active   doxycycline (Adoxa) 100 mg tablet [id number] No Take 1 tablet (100 mg) by mouth 2 times a day. Historical MD Kaye Not Taking Active   Entresto 24-26 mg tablet [id number] Yes Take 1 tablet by mouth 2 times a day. Thuy Garcia APRN-CNP Taking Active   ergocalciferol (Vitamin D-2) 1.25 MG (35004 UT) capsule [id number] No Take 1 capsule (50,000 Units) by mouth once a week. Stephanie Restrepo MD Not Taking Active   escitalopram (Lexapro) 10 mg tablet [id number] No Take 1 tablet (10 mg) by mouth once  daily at bedtime. Historical Provider, MD Not Taking Active   escitalopram (Lexapro) 20 mg tablet [id number] Yes Take 1 tablet (20 mg) by mouth once daily. LIANE Go Taking Active   CV-L3-N0-F5-R8-L03-C-Zn 1 mg-1.5 mg- 1.7 mg-50 mg tablet [id number] No Take by mouth. Historical Provider, MD Not Taking Active   ferrous sulfate 325 (65 Fe) MG tablet [id number] Yes Take 1 tablet (65 mg of iron) by mouth 2 times a day. Historical Provider, MD Taking Active   fexofenadine (Allegra Allergy) 60 mg tablet [id number] No 1 tablet (60 mg) once daily. Historical Provider, MD Not Taking Active   fexofenadine (Allegra) 180 mg tablet [id number] Yes Take 1 tablet (180 mg) by mouth once daily. Historical Provider, MD Taking Active   fluocinolone 0.01 % cream [id number] No fluocinolone 0.01 % topical cream Historical Provider, MD Not Taking Active   fluticasone (Flonase) 50 mcg/actuation nasal spray [id number] Yes Administer 1 spray into affected nostril(s) once daily. Historical Provider, MD Taking Active   folic acid (Folvite) 1 mg tablet [id number] Yes Take by mouth. Historical Provider, MD Taking Active   furosemide (Lasix) 20 mg tablet [id number] Yes Take 1 tablet (20 mg) by mouth once daily. Historical Provider, MD Taking Active   gabapentin (Neurontin) 100 mg capsule [id number] Yes Take 3 capsules (300 mg) by mouth 2 times a day. LIANE Go Taking Active   gabapentin (Neurontin) 400 mg capsule [id number] No Take 1 capsule (400 mg) by mouth 3 times a day. Historical Provider, MD Not Taking Active   GABAPENTIN ORAL [id number] No Take 1 tablet by mouth twice a day. Historical Provider, MD Not Taking Active   HYDROcodone-acetaminophen (Norco) 5-325 mg tablet [id number]  Take 1 tablet by mouth every 6 hours if needed. Historical Provider, MD  Active   hydroxychloroquine (Plaquenil) 200 mg tablet [id number] Yes Take 1 tablet (200 mg) by mouth 2 times a day. LIANE Go Taking Active   iron 18 mg tablet [id number] No Take  by mouth. Historical Provider, MD Not Taking Active   iron/C/B12/B6/E/FA/if/senna lf (IRO-PLEX, IRON POLYSACCHARIDE, ORAL) [id number] No Take 1 tablet by mouth once daily. iron polysaccharide 50 mg oral tablet Stephanie Restrepo MD Not Taking Active   Jardiance 10 mg [id number] Yes Take 1 tablet (10 mg) by mouth once daily. LIANE Go Taking Active   LACTOBACILLUS ACIDOPHILUS ORAL [id number] No Take 200 mg by mouth twice a day. Historical Provider, MD Not Taking Active   levothyroxine (Synthroid, Levoxyl) 100 mcg tablet [id number] Yes Take 1 tablet (100 mcg) by mouth once daily. LIANE Go Taking Active   linaGLIPtin (Tradjenta) 5 mg tablet [id number] No  Historical Provider, MD Not Taking Active   magnesium oxide (Mag-Ox) 400 mg tablet [id number] No Take 2 tablets (800 mg) by mouth once daily. Historical Provider, MD Not Taking Active   mecobalamin, vitamin B12, 5,000 mcg lozenge [id number] No Take 5,000 Units by mouth once daily. Historical Provider, MD Not Taking Active   meloxicam (Mobic) 7.5 mg tablet [id number] Yes Take 1 tablet (7.5 mg) by mouth once daily. LIANE Go Taking Active   metFORMIN (Glucophage) 1,000 mg tablet [id number] Yes Take 1 tablet (1,000 mg) by mouth 2 times a day with meals. LIANE Go Taking Active   methocarbamol (Robaxin) 500 mg tablet [id number] No TAKE 2 TABLETS BY MOUTH EVERY 8 HOURS TO REDUCE MUSCLE SPASMS Historical Provider, MD Not Taking Active   methocarbamol (Robaxin) 500 mg tablet [id number] Yes Take 1 tablet (500 mg) by mouth 3 times a day. Historical Provider, MD Taking Active   metoprolol succinate XL (Toprol-XL) 100 mg 24 hr tablet [id number] Yes Take 1 tablet (100 mg) by mouth once daily. LIANE Go Taking Active   metoprolol succinate XL (Toprol-XL) 25 mg 24 hr tablet [id number] No Take 1 tablet (25 mg) by mouth once daily. Historical Provider, MD Not Taking Active   metoprolol succinate XL (Toprol-XL) 50 mg 24 hr tablet [id number]  No Take 1 tablet (50 mg) by mouth once daily. Stephanie Restrepo MD Not Taking Active   multivit/folic acid/vit K1 (WOMEN'S 50 PLUS ADVANCED ORAL) [id number] No Take by mouth. Stephanie Restrepo MD Not Taking Active   multivitamin with minerals (Adults Multivitamin) tablet [id number] Yes Take 1 tablet by mouth once daily. Stephanie Restrepo MD Taking Active   multivitamin with minerals (multivitamin with folic acid) tablet [id number] No Take 1 tablet by mouth once daily. Stephanie Restrepo MD Not Taking Active   naproxen (Naprosyn) 500 mg tablet [id number] No Take 1 tablet (500 mg) by mouth 2 times a day with meals. Stephanie Restrepo MD Not Taking Active   ondansetron (Zofran) 4 mg tablet [id number] No Take 1 tablet (4 mg) by mouth every 8 hours if needed for nausea or vomiting. Stephanie Restrepo MD Not Taking Active   oxyCODONE (Roxicodone) 5 mg immediate release tablet [id number] No  Stephanie Restrepo MD Not Taking Active   pantoprazole (ProtoNix) 40 mg EC tablet [id number] Yes Take 1 tablet (40 mg) by mouth once daily. Thuy Garcia, APRN-CNP Taking Active   phenylephrine (Sudafed PE) 10 mg tablet [id number] No Take 1 tablet (10 mg) by mouth 2 times a day as needed for congestion. Stephanie Restrepo MD Not Taking Active   polyethylene glycol (Glycolax, Miralax) 17 gram/dose powder [id number] No USE 17 GRAMS BY MOUTH TWICE DAILY FOR 14 DAYS TO PREVENT CONSTIPATION. DISSOLVE IN LIQUID AS DIRECTED. Stephanie Restrepo MD Not Taking Active   pravastatin (Pravachol) 40 mg tablet [id number] No Take 1 tablet (40 mg) by mouth once daily. Stephanie Restrepo MD Not Taking Active   prednisoLONE acetate (Pred-Forte) 1 % ophthalmic suspension [id number] No prednisolone acetate 1 % eye drops,suspension Stephanie Restrepo MD Not Taking Active   tobramycin-dexamethasone (Tobradex) ophthalmic suspension [id number] No tobramycin 0.3 %-dexamethasone 0.1 % eye drops,suspension Stephanie Restrepo MD Not Taking Active    ubidecarenone (COENZYME Q10, BULK, MISC) [id number] No Take 100 mg by mouth. Historical Provider, MD Not Taking Active   vitamin B12-folic acid 0.5-1 mg tablet [id number] No Take 1 tablet by mouth once daily. Historical Provider, MD Not Taking Active                    Allergies   Allergen Reactions    Shellfish Containing Products Anaphylaxis    Amoxicillin-Pot Clavulanate Nausea And Vomiting and Other     GI Intolerance    Codeine Nausea And Vomiting and Other     GI Intolerance    Neomycin Hives    Sulfa (Sulfonamide Antibiotics) Itching, Rash and Other     Vomiting     Tramadol Other and Itching     tongue blistered    Celecoxib Rash       Review of Systems   Constitutional:  Negative for appetite change.   HENT:  Negative for trouble swallowing.    Eyes:  Negative for discharge.   Respiratory:  Negative for shortness of breath and wheezing.    Cardiovascular:  Negative for chest pain.   Musculoskeletal:  Positive for joint swelling.   Skin:  Negative for color change and rash.   All other systems reviewed and are negative.      Exam   On exam the left shoulder shows active forward flexion of 220 degrees, positive impingement signs on the left and some mild weakness with resisted external rotation.  Deltoid is functional.  Continued left trapezial tenderness.  Good pulses and sensation in the upper extremity.    Assessment   Left shoulder impingement      Plan   We discussed a repeat injection today but she understands going forward we would like to wait at least 3 months in between injections.  She may need neck surgery in the future and we have discussed the possible reverse shoulder replacement in the future as well for continued shoulder symptoms.  We sterilely injected under ultrasound guidance Kenalog and lidocaine in the left shoulder subacromial space.  Patient understands the small risk of infection and the signs to look for as well as flare reaction.  Hopefully this gives her significantly.  She can  follow-up with us as needed    L Inj/Asp: L subacromial bursa on 10/3/2023 10:41 AM  Indications: pain  Details: 22 G needle, ultrasound-guided lateral approach  Medications: 10 mg triamcinolone acetonide 10 mg/mL    After discussing the risks and benefits of the procedure with proceeded with an injection.  Using ultrasound guidance we identified the acromion, humeral head and the subacromial bursa, images obtained. We then sterilely injected the left subacrominal space with a mixture of 40 mg of Kenalog and 2 cc of 1 % lidocaine. Pt tolerated the procedure well without any adverse reactions.

## 2023-10-03 NOTE — LETTER
October 3, 2023     January Garcia DO  [address] [facility] [address]    Patient: Joana Elizabeth   YOB: 1950   Date of Visit: 10/3/2023       Dear Dr. January Garcia, DO:    Thank you for referring Joana Elizabeth to me for evaluation. Below are my notes for this consultation.  If you have questions, please do not hesitate to call me. I look forward to following your patient along with you.       Sincerely,     Beau Shrestha MD      CC: No Recipients  ______________________________________________________________________________________    Reason for Appointment  Chief Complaint   Patient presents with   • Left Shoulder - Pain       History of Present Illness  Patient is a 73 y.o. female here today for follow-up evaluation of recurrent left shoulder pain.  She had a subacromial injection about 6 weeks ago that did give her some relief, she is having recurrent lateral sided pain and she is getting some radicular symptoms down the arm.  She is following up with Dr. Hawkins for her neck, she may have some hardware loosening in her neck and may need revision neck surgery in the coming future.  We have discussed a possible reverse shoulder replacement in the future but she is not interested in this today.  No other changes in her past medical history, allergies, or medications..     Past Medical History:   Diagnosis Date   • Abnormal levels of other serum enzymes 12/01/2020    Abnormal AST and ALT   • Acquired absence of other specified parts of digestive tract 07/10/2019    Status post laparoscopic cholecystectomy   • Anemia, unspecified 12/28/2018    Postoperative anemia   • Body mass index (BMI) 36.0-36.9, adult 12/16/2019    BMI 36.0-36.9,adult   • Body mass index (BMI) 37.0-37.9, adult 05/30/2019    BMI 37.0-37.9, adult   • Calculus of gallbladder without cholecystitis without obstruction 06/26/2019    Gallstones   • Cellulitis of face 08/21/2017    Cellulitis of face   • Chronic maxillary  sinusitis 03/22/2018    Right maxillary sinusitis   • Contact with and (suspected) exposure to covid-19 09/08/2020    Suspected COVID-19 virus infection   • COVID-19 10/02/2020    COVID-19 virus infection   • Disorder of bone, unspecified 01/26/2021    Disorder of bone and cartilage   • Dorsalgia, unspecified 10/15/2019    Mid-back pain, acute   • Effusion, unspecified hand 10/14/2016    Swelling of hand joint   • Elevated erythrocyte sedimentation rate 11/17/2014    Elevated sedimentation rate   • Encounter for other preprocedural examination 08/19/2021    Pre-op evaluation   • Encounter for screening for malignant neoplasm of cervix 05/04/2016    Cervical cancer screening   • Encounter for screening for malignant neoplasm of colon 09/21/2018    Encounter for screening colonoscopy   • Encounter for screening for osteoporosis 09/01/2020    Screening for osteoporosis   • Epigastric pain 11/17/2014    Dyspepsia   • Essential (primary) hypertension 08/19/2021    Benign essential HTN   • Fever, unspecified 09/08/2020    Fever with exposure to COVID-19 virus   • Gastroparesis 11/19/2019    Gastroparesis   • Gastroparesis 12/05/2019    Gastroparesis   • History of falling 08/04/2021    Status post fall   • Laceration without foreign body of other part of head, initial encounter 03/22/2018    Forehead laceration   • Left lower quadrant abdominal tenderness 06/11/2018    LLQ abdominal tenderness   • Localized edema 05/01/2018    Leg edema   • Occipital neuralgia 02/10/2017    Occipital neuralgia of right side   • Osteomyelitis, unspecified (CMS/HCC) 12/16/2019    Knee osteomyelits, right   • Other amnesia 09/01/2020    Memory problem   • Other conditions influencing health status     Screening for malignant neoplasms, colon   • Other general symptoms and signs 08/14/2017    Forgetfulness   • Other intervertebral disc displacement, lumbar region 04/23/2020    Disc displacement, lumbar   • Other long term (current) drug  therapy 04/02/2020    Long-term use of Plaquenil   • 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    Postherpetic neuralgia   • Other postprocedural complications and disorders of digestive system 12/16/2019    Postcholecystectomy diarrhea   • Other specified disorders of kidney and ureter 05/30/2019    Left renal mass   • Pain in left shoulder 05/11/2020    Pain in left shoulder   • Pain in left shoulder 04/17/2018    Acute pain of left shoulder   • Pain in right knee 04/23/2020    Bilateral knee pain   • Pain in right lower leg 01/20/2016    Calf pain, right   • Pain in unspecified hand 04/26/2021    Hand pain   • Pain in unspecified shoulder 01/13/2017    Shoulder pain   • Pelvic and perineal pain 05/04/2016    Pelvic cramping   • Periumbilical pain 10/02/2020    Umbilical pain   • Personal history of diseases of the skin and subcutaneous tissue 09/01/2020    History of eczema   • Personal history of other diseases of the circulatory system 09/28/2021    History of cardiomyopathy   • Personal history of other diseases of the circulatory system 08/03/2021    History of carotid artery stenosis   • Personal history of other diseases of the digestive system 10/21/2019    History of gastritis   • Personal history of other diseases of the digestive system 06/26/2019    History of biliary colic   • Personal history of other diseases of the nervous system and sense organs 05/11/2020    History of sleep apnea   • Personal history of other diseases of the nervous system and sense organs 12/08/2021    History of acute otitis media   • Personal history of other diseases of the respiratory system 11/07/2016    History of acute bronchitis   • Personal history of other diseases of urinary system 05/04/2016    History of hematuria   • Personal history of other endocrine, nutritional and metabolic disease 04/26/2016    History of hyperglycemia   • Personal history of other infectious and parasitic diseases     History  of scarlet fever   • Personal history of other infectious and parasitic diseases 09/21/2018    History of candidiasis of vagina   • Personal history of other infectious and parasitic diseases 07/08/2019    History of surgical site infection   • Personal history of other infectious and parasitic diseases 08/21/2017    History of herpes zoster   • Personal history of other infectious and parasitic diseases 02/13/2018    History of surgical site infection   • Personal history of other medical treatment 12/16/2019    History of screening mammography   • Personal history of other mental and behavioral disorders 08/15/2016    History of depression   • Personal history of other specified conditions 06/04/2014    History of headache   • Personal history of other specified conditions 05/11/2020    History of snoring   • Personal history of other specified conditions 01/05/2017    History of chest pain   • Personal history of other specified conditions 11/25/2015    History of dysphagia   • Personal history of other specified conditions 11/25/2015    History of right flank pain   • Personal history of other specified conditions 11/25/2015    History of neck swelling   • Personal history of other specified conditions 12/16/2019    History of diarrhea   • Personal history of other specified conditions 08/04/2021    History of dizziness   • Personal history of other specified conditions 05/15/2018    History of balance disorder   • Personal history of other specified conditions 10/12/2017    History of fever   • Personal history of urinary (tract) infections 05/04/2016    History of urinary tract infection   • Pneumonia, unspecified organism 03/29/2019    Left lower lobe pneumonia   • Radiculopathy, cervical region 02/17/2020    Cervical radiculopathy   • Radiculopathy, lumbar region 02/17/2020    Lumbar radiculitis   • Repeated falls 04/17/2018    Recurrent falls   • Right upper quadrant pain 12/01/2020    Chronic RUQ pain   •  Right upper quadrant pain 2019    Right upper quadrant abdominal pain   • Spondylosis without myelopathy or radiculopathy, cervical region 2017    Spondylosis of cervical region without myelopathy or radiculopathy   • Spondylosis without myelopathy or radiculopathy, lumbar region 2020    Lumbar spondylosis   • Stricture of artery (CMS/HCC) 2021    Subclavian artery stenosis, right   • Trigger finger, right index finger 2021    Trigger index finger of right hand   • Trigger finger, right ring finger 2021    Trigger ring finger of right hand   • Trigger finger, unspecified finger 2021    Trigger finger, acquired   • Unspecified asthma, uncomplicated 2020    Asthmatic bronchitis   • Unspecified foreign body in larynx causing other injury, initial encounter 2015    Choking   • Unspecified nonsuppurative otitis media, bilateral 2019    Bilateral serous otitis media   • Unspecified otitis externa, left ear 2017    External otitis of left ear       Past Surgical History:   Procedure Laterality Date   • BACK SURGERY  10/12/2017    Back Surgery   • BACK SURGERY  2017    Lower Back Surgery   •  SECTION, CLASSIC  2013     Section   • CT GUIDED PERCUTANEOUS BIOPSY MEDIASTINUM  2017    CT GUIDED PERCUTANEOUS BIOPSY MEDIASTINUM 2017 [facility]   • DILATION AND CURETTAGE OF UTERUS  2013    Dilation And Curettage   • OTHER SURGICAL HISTORY  2019    Cholecystectomy   • OTHER SURGICAL HISTORY  2019    Knee arthroscopy   • OTHER SURGICAL HISTORY  2019    Colonoscopy   • OTHER SURGICAL HISTORY  2019    Esophagogastroduodenoscopy   • TONSILLECTOMY  2013    Tonsillectomy   • TUBAL LIGATION  2013    Tubal Ligation       Medication Documentation Review Audit       Reviewed by Shraddha Lynch MA (Medical Assistant) on 10/03/23 at 1011      Medication Order Taking? Sig Documenting Provider  Last Dose Status   acetaminophen (Tylenol) 325 mg tablet [id number] Yes Take 2 tablets (650 mg) by mouth every 6 hours. Historical Provider, MD Taking Active   albuterol 90 mcg/actuation inhaler [id number] No Inhale 1 puff every 4 hours if needed. Historical Provider, MD Not Taking Active   albuterol 90 mcg/actuation inhaler [id number] Yes Inhale 2 puffs. as instructed. Historical Provider, MD Taking Active   aspirin 81 mg capsule [id number] Yes Take 1 tablet by mouth once daily. CHEW AND SWALLOW Historical Provider, MD Taking Active   aspirin 81 mg chewable tablet [id number] No Chew 1 tablet (81 mg) once daily. Historical Provider, MD Not Taking Active   atorvastatin (Lipitor) 40 mg tablet [id number] Yes Take 1 tablet (40 mg) by mouth once daily. LIANE Go Taking Active   azelastine (Optivar) 0.05 % ophthalmic solution [id number] No Administer 1 drop into both eyes 2 times a day. Historical Provider, MD Not Taking Active   azithromycin (Azasite) 1 % ophthalmic solution [id number] No Azasite 1 % eye drops Historical Provider, MD Not Taking Active   b complex vitamins capsule [id number]  Take 1 capsule by mouth once daily. Historical Provider, MD  Active   biotin 10 mg tablet [id number] Yes Take 1 tablet (10 mg) by mouth once daily. Historical Provider, MD Taking Active   biotin 5 mg tablet [id number] Yes Take by mouth. Historical Provider, MD Taking Active   blood sugar diagnostic (Truetrack Test) strip [id number] Yes TEST twice a day Historical Provider, MD Taking Active   budesonide-formoteroL (Symbicort) 80-4.5 mcg/actuation inhaler [id number] Yes Inhale 2 puffs  in the morning and 2 puffs before bedtime. Rinse mouth with water after use to reduce aftertaste and incidence of candidiasis. Do not swallow.. LIANE Go Taking Active   busPIRone (Buspar) 7.5 mg tablet [id number] No Take 1 tablet (7.5 mg) by mouth 3 times a day as needed (270).   Patient not taking: Reported on 10/3/2023    LIANE Go  Not Taking Active   busPIRone (Buspar) 7.5 mg tablet [id number] Yes Take 1 tablet (7.5 mg) by mouth twice a day. Historical Provider, MD Taking Active   calcium carbonate-vitamin D3 600 mg-20 mcg (800 unit) tablet [id number] Yes Caltrate with Vitamin D3 600 mg (1,500 mg)-800 unit tablet Historical Provider, MD Taking Active   calcium carbonate-vitamin D3 600 mg-5 mcg (200 unit) tablet [id number] No Take by mouth. Historical Provider, MD Not Taking Active   calcium citrate-vitamin D3 200 mg-6.25 mcg (250 unit) tablet [id number] No Take 1 tablet by mouth once daily. Historical Provider, MD Not Taking Active   cefuroxime (Ceftin) 500 mg tablet [id number] No Take 1 tablet (500 mg) by mouth twice a day. Historical MD Kaye Not Taking Active   chlorhexidine (Hibiclens) 4 % external liquid [id number] No Use ad directed for preoperative shower Historical Provider, MD Not Taking Active   chlorhexidine (Peridex) 0.12 % solution [id number] No USE TO RINSE MOUTH WITH WITH 15ML (1 CAPFUL) FOR 30 SECONDS IN THE MORNING AND EVENING AFTER TOOTHBRUSHING. EXPECTORATE AFTER RINSING. DO NOT SWALLOW. Historical Provider, MD Not Taking Active   cholecalciferol (Vitamin D-3) 50 mcg (2,000 unit) capsule [id number] Yes Vitamin D3 2,000 unit capsule Historical MD Kaye Taking Active   ciprofloxacin (Ciloxan) 0.3 % ophthalmic solution [id number] No ciprofloxacin 0.3 % eye drops Historical MD Kaye Not Taking Active   clotrimazole-betamethasone (Lotrisone) cream [id number] No clotrimazole-betamethasone 1 %-0.05 % topical cream Historical Provider, MD Not Taking Active   coenzyme Q-10 200 mg capsule [id number] Yes Take 1 capsule (200 mg) by mouth once daily. Historical Provider, MD Taking Active   cyanocobalamin (Vitamin B-12) 250 mcg tablet [id number] Yes Take by mouth. Historical Provider, MD Taking Active   diclofenac sodium/misoprostol (ARTHROTEC 75 ORAL) [id number] No Take by mouth 2 times a day. Historical MD Kaye Not Taking Active    diclofenac-misoprostoL (Arthrotec 75)  mg-mcg EC tablet [id number] No Take 1 tablet by mouth 2 times a day. Historical Provider, MD Not Taking Active   doxycycline (Adoxa) 100 mg tablet [id number] No Take 1 tablet (100 mg) by mouth 2 times a day. Historical Provider, MD Not Taking Active   Entresto 24-26 mg tablet [id number] Yes Take 1 tablet by mouth 2 times a day. LIANE Go Taking Active   ergocalciferol (Vitamin D-2) 1.25 MG (18380 UT) capsule [id number] No Take 1 capsule (50,000 Units) by mouth once a week. Historical Provider, MD Not Taking Active   escitalopram (Lexapro) 10 mg tablet [id number] No Take 1 tablet (10 mg) by mouth once daily at bedtime. Historical Provider, MD Not Taking Active   escitalopram (Lexapro) 20 mg tablet [id number] Yes Take 1 tablet (20 mg) by mouth once daily. LIANE Go Taking Active   JW-L3-L6-H1-I2-T82-C-Zn 1 mg-1.5 mg- 1.7 mg-50 mg tablet [id number] No Take by mouth. Historical Provider, MD Not Taking Active   ferrous sulfate 325 (65 Fe) MG tablet [id number] Yes Take 1 tablet (65 mg of iron) by mouth 2 times a day. Historical Provider, MD Taking Active   fexofenadine (Allegra Allergy) 60 mg tablet [id number] No 1 tablet (60 mg) once daily. Historical Provider, MD Not Taking Active   fexofenadine (Allegra) 180 mg tablet [id number] Yes Take 1 tablet (180 mg) by mouth once daily. Historical Provider, MD Taking Active   fluocinolone 0.01 % cream [id number] No fluocinolone 0.01 % topical cream Historical Provider, MD Not Taking Active   fluticasone (Flonase) 50 mcg/actuation nasal spray [id number] Yes Administer 1 spray into affected nostril(s) once daily. Historical Provider, MD Taking Active   folic acid (Folvite) 1 mg tablet [id number] Yes Take by mouth. Historical Provider, MD Taking Active   furosemide (Lasix) 20 mg tablet [id number] Yes Take 1 tablet (20 mg) by mouth once daily. Historical Provider, MD Taking Active   gabapentin (Neurontin) 100 mg capsule [id number] Yes  Take 3 capsules (300 mg) by mouth 2 times a day. LIANE Go Taking Active   gabapentin (Neurontin) 400 mg capsule [id number] No Take 1 capsule (400 mg) by mouth 3 times a day. Historical Provider, MD Not Taking Active   GABAPENTIN ORAL [id number] No Take 1 tablet by mouth twice a day. Historical MD Kaye Not Taking Active   HYDROcodone-acetaminophen (Norco) 5-325 mg tablet [id number]  Take 1 tablet by mouth every 6 hours if needed. Historical Provider, MD  Active   hydroxychloroquine (Plaquenil) 200 mg tablet [id number] Yes Take 1 tablet (200 mg) by mouth 2 times a day. LIANE Go Taking Active   iron 18 mg tablet [id number] No Take by mouth. Historical Provider, MD Not Taking Active   iron/C/B12/B6/E/FA/if/senna lf (IRO-PLEX, IRON POLYSACCHARIDE, ORAL) [id number] No Take 1 tablet by mouth once daily. iron polysaccharide 50 mg oral tablet Historical Provider, MD Not Taking Active   Jardiance 10 mg [id number] Yes Take 1 tablet (10 mg) by mouth once daily. LIANE Go Taking Active   LACTOBACILLUS ACIDOPHILUS ORAL [id number] No Take 200 mg by mouth twice a day. Historical Provider, MD Not Taking Active   levothyroxine (Synthroid, Levoxyl) 100 mcg tablet [id number] Yes Take 1 tablet (100 mcg) by mouth once daily. LIANE Go Taking Active   linaGLIPtin (Tradjenta) 5 mg tablet [id number] No  Historical Provider, MD Not Taking Active   magnesium oxide (Mag-Ox) 400 mg tablet [id number] No Take 2 tablets (800 mg) by mouth once daily. Historical Provider, MD Not Taking Active   mecobalamin, vitamin B12, 5,000 mcg lozenge [id number] No Take 5,000 Units by mouth once daily. Historical Provider, MD Not Taking Active   meloxicam (Mobic) 7.5 mg tablet [id number] Yes Take 1 tablet (7.5 mg) by mouth once daily. LIANE Go Taking Active   metFORMIN (Glucophage) 1,000 mg tablet [id number] Yes Take 1 tablet (1,000 mg) by mouth 2 times a day with meals. LIANE Go Taking Active    methocarbamol (Robaxin) 500 mg tablet [id number] No TAKE 2 TABLETS BY MOUTH EVERY 8 HOURS TO REDUCE MUSCLE SPASMS Historical Provider, MD Not Taking Active   methocarbamol (Robaxin) 500 mg tablet [id number] Yes Take 1 tablet (500 mg) by mouth 3 times a day. Historical Provider, MD Taking Active   metoprolol succinate XL (Toprol-XL) 100 mg 24 hr tablet [id number] Yes Take 1 tablet (100 mg) by mouth once daily. LIANE Go Taking Active   metoprolol succinate XL (Toprol-XL) 25 mg 24 hr tablet [id number] No Take 1 tablet (25 mg) by mouth once daily. Historical Provider, MD Not Taking Active   metoprolol succinate XL (Toprol-XL) 50 mg 24 hr tablet [id number] No Take 1 tablet (50 mg) by mouth once daily. Historical Provider, MD Not Taking Active   multivit/folic acid/vit K1 (WOMEN'S 50 PLUS ADVANCED ORAL) [id number] No Take by mouth. Historical Provider, MD Not Taking Active   multivitamin with minerals (Adults Multivitamin) tablet [id number] Yes Take 1 tablet by mouth once daily. Historical Provider, MD Taking Active   multivitamin with minerals (multivitamin with folic acid) tablet [id number] No Take 1 tablet by mouth once daily. Historical Provider, MD Not Taking Active   naproxen (Naprosyn) 500 mg tablet [id number] No Take 1 tablet (500 mg) by mouth 2 times a day with meals. Historical MD Kaye Not Taking Active   ondansetron (Zofran) 4 mg tablet [id number] No Take 1 tablet (4 mg) by mouth every 8 hours if needed for nausea or vomiting. Historical Provider, MD Not Taking Active   oxyCODONE (Roxicodone) 5 mg immediate release tablet [id number] No  Historical Provider, MD Not Taking Active   pantoprazole (ProtoNix) 40 mg EC tablet [id number] Yes Take 1 tablet (40 mg) by mouth once daily. LIANE Go Taking Active   phenylephrine (Sudafed PE) 10 mg tablet [id number] No Take 1 tablet (10 mg) by mouth 2 times a day as needed for congestion. Historical MD Kaye Not Taking Active   polyethylene glycol  (Glycolax, Miralax) 17 gram/dose powder [id number] No USE 17 GRAMS BY MOUTH TWICE DAILY FOR 14 DAYS TO PREVENT CONSTIPATION. DISSOLVE IN LIQUID AS DIRECTED. Historical Provider, MD Not Taking Active   pravastatin (Pravachol) 40 mg tablet [id number] No Take 1 tablet (40 mg) by mouth once daily. Historical Provider, MD Not Taking Active   prednisoLONE acetate (Pred-Forte) 1 % ophthalmic suspension [id number] No prednisolone acetate 1 % eye drops,suspension Historical Provider, MD Not Taking Active   tobramycin-dexamethasone (Tobradex) ophthalmic suspension [id number] No tobramycin 0.3 %-dexamethasone 0.1 % eye drops,suspension Historical Provider, MD Not Taking Active   ubidecarenone (COENZYME Q10, BULK, MISC) [id number] No Take 100 mg by mouth. Historical Provider, MD Not Taking Active   vitamin B12-folic acid 0.5-1 mg tablet [id number] No Take 1 tablet by mouth once daily. Historical Provider, MD Not Taking Active                    Allergies   Allergen Reactions   • Shellfish Containing Products Anaphylaxis   • Amoxicillin-Pot Clavulanate Nausea And Vomiting and Other     GI Intolerance   • Codeine Nausea And Vomiting and Other     GI Intolerance   • Neomycin Hives   • Sulfa (Sulfonamide Antibiotics) Itching, Rash and Other     Vomiting    • Tramadol Other and Itching     tongue blistered   • Celecoxib Rash       Review of Systems   Constitutional:  Negative for appetite change.   HENT:  Negative for trouble swallowing.    Eyes:  Negative for discharge.   Respiratory:  Negative for shortness of breath and wheezing.    Cardiovascular:  Negative for chest pain.   Musculoskeletal:  Positive for joint swelling.   Skin:  Negative for color change and rash.   All other systems reviewed and are negative.      Exam  On exam the left shoulder shows active forward flexion of 220 degrees, positive impingement signs on the left and some mild weakness with resisted external rotation.  Deltoid is functional.  Continued left trapezial  tenderness.  Good pulses and sensation in the upper extremity.    Assessment  Left shoulder impingement      Plan  We discussed a repeat injection today but she understands going forward we would like to wait at least 3 months in between injections.  She may need neck surgery in the future and we have discussed the possible reverse shoulder replacement in the future as well for continued shoulder symptoms.  We sterilely injected under ultrasound guidance Kenalog and lidocaine in the left shoulder subacromial space.  Patient understands the small risk of infection and the signs to look for as well as flare reaction.  Hopefully this gives her significantly.  She can follow-up with us as needed    L Inj/Asp on 10/3/2023 10:41 AM  Indications: pain  Details: 22 G needle, ultrasound-guided lateral approach  Medications: 10 mg triamcinolone acetonide 10 mg/mL      After discussing the risks and benefits of the procedure with proceeded with an injection.  Using ultrasound guidance we identified the acromion, humeral head and the subacromial bursa, images obtained. We then sterilely injected the left subacrominal space with a mixture of 40 mg of Kenalog and 1 cc of 1 % lidocaine. Pt tolerated the procedure well without any adverse reactions.

## 2023-10-10 ENCOUNTER — OFFICE VISIT (OUTPATIENT)
Dept: PRIMARY CARE | Facility: CLINIC | Age: 73
End: 2023-10-10
Payer: MEDICARE

## 2023-10-10 VITALS
BODY MASS INDEX: 39.51 KG/M2 | SYSTOLIC BLOOD PRESSURE: 126 MMHG | HEIGHT: 63 IN | TEMPERATURE: 97.2 F | DIASTOLIC BLOOD PRESSURE: 64 MMHG | OXYGEN SATURATION: 96 % | HEART RATE: 86 BPM | WEIGHT: 223 LBS

## 2023-10-10 DIAGNOSIS — B33.24 VIRAL CARDIOMYOPATHY (MULTI): ICD-10-CM

## 2023-10-10 DIAGNOSIS — E78.2 MIXED HYPERLIPIDEMIA: ICD-10-CM

## 2023-10-10 DIAGNOSIS — E03.9 ACQUIRED HYPOTHYROIDISM: ICD-10-CM

## 2023-10-10 DIAGNOSIS — E11.8 CONTROLLED TYPE 2 DIABETES MELLITUS WITH COMPLICATION, WITHOUT LONG-TERM CURRENT USE OF INSULIN (MULTI): Primary | ICD-10-CM

## 2023-10-10 LAB
POC ALBUMIN /CREATININE RATIO MANUALLY ENTERED: ABNORMAL UG/MG CREAT
POC URINE ALBUMIN: 10 MG/L
POC URINE CREATININE: 50 MG/DL

## 2023-10-10 PROCEDURE — 80053 COMPREHEN METABOLIC PANEL: CPT

## 2023-10-10 PROCEDURE — 1126F AMNT PAIN NOTED NONE PRSNT: CPT | Performed by: FAMILY MEDICINE

## 2023-10-10 PROCEDURE — 1160F RVW MEDS BY RX/DR IN RCRD: CPT | Performed by: FAMILY MEDICINE

## 2023-10-10 PROCEDURE — 1036F TOBACCO NON-USER: CPT | Performed by: FAMILY MEDICINE

## 2023-10-10 PROCEDURE — 85025 COMPLETE CBC W/AUTO DIFF WBC: CPT

## 2023-10-10 PROCEDURE — 99214 OFFICE O/P EST MOD 30 MIN: CPT | Performed by: FAMILY MEDICINE

## 2023-10-10 PROCEDURE — 83036 HEMOGLOBIN GLYCOSYLATED A1C: CPT

## 2023-10-10 PROCEDURE — 36415 COLL VENOUS BLD VENIPUNCTURE: CPT

## 2023-10-10 PROCEDURE — 3078F DIAST BP <80 MM HG: CPT | Performed by: FAMILY MEDICINE

## 2023-10-10 PROCEDURE — 84443 ASSAY THYROID STIM HORMONE: CPT

## 2023-10-10 PROCEDURE — 80061 LIPID PANEL: CPT

## 2023-10-10 PROCEDURE — 1159F MED LIST DOCD IN RCRD: CPT | Performed by: FAMILY MEDICINE

## 2023-10-10 PROCEDURE — 3074F SYST BP LT 130 MM HG: CPT | Performed by: FAMILY MEDICINE

## 2023-10-10 PROCEDURE — 82044 UR ALBUMIN SEMIQUANTITATIVE: CPT | Performed by: FAMILY MEDICINE

## 2023-10-10 PROCEDURE — 3008F BODY MASS INDEX DOCD: CPT | Performed by: FAMILY MEDICINE

## 2023-10-10 RX ORDER — FUROSEMIDE 20 MG/1
20 TABLET ORAL DAILY
Qty: 90 TABLET | Refills: 3 | Status: SHIPPED | OUTPATIENT
Start: 2023-10-10 | End: 2023-10-19 | Stop reason: SDUPTHER

## 2023-10-10 ASSESSMENT — ENCOUNTER SYMPTOMS
COUGH: 0
BACK PAIN: 1
EYE DISCHARGE: 0
SORE THROAT: 0
LIGHT-HEADEDNESS: 0
WHEEZING: 0
NUMBNESS: 0
SLEEP DISTURBANCE: 0
FLANK PAIN: 0
JOINT SWELLING: 0
CONSTIPATION: 0
ACTIVITY CHANGE: 0
APPETITE CHANGE: 0
EYE ITCHING: 0
NAUSEA: 0
MYALGIAS: 0
ABDOMINAL PAIN: 0
WEAKNESS: 0
BLOOD IN STOOL: 0
PALPITATIONS: 0
SHORTNESS OF BREATH: 0
DIZZINESS: 0
ARTHRALGIAS: 1
POLYPHAGIA: 1
HEMATURIA: 0
FEVER: 0
HEADACHES: 0
VOMITING: 0
UNEXPECTED WEIGHT CHANGE: 0
DIARRHEA: 0
DYSURIA: 0
RHINORRHEA: 0
POLYDIPSIA: 1
NERVOUS/ANXIOUS: 0
SINUS PRESSURE: 0
DYSPHORIC MOOD: 0

## 2023-10-10 NOTE — PROGRESS NOTES
"Subjective   Patient ID: Joana Elizabeth is a 73 y.o. female who presents for Establish Care and Med Refill.    Med Refill  Associated symptoms include arthralgias. Pertinent negatives include no abdominal pain, chest pain, congestion, coughing, fever, headaches, joint swelling, myalgias, nausea, numbness, rash, sore throat, vomiting or weakness.    CARDIOLOGY DR MCDONALD  HAD HEART FAILURE S/P COVID   GERD AND HYPOTHYROID     Review of Systems   Constitutional:  Negative for activity change, appetite change, fever and unexpected weight change.   HENT:  Negative for congestion, ear pain, postnasal drip, rhinorrhea, sinus pressure and sore throat.    Eyes:  Negative for discharge, itching and visual disturbance.   Respiratory:  Negative for cough, shortness of breath and wheezing.    Cardiovascular:  Negative for chest pain, palpitations and leg swelling.   Gastrointestinal:  Negative for abdominal pain, blood in stool, constipation, diarrhea, nausea and vomiting.   Endocrine: Positive for polydipsia, polyphagia and polyuria. Negative for cold intolerance and heat intolerance.   Genitourinary:  Negative for dysuria, flank pain and hematuria.   Musculoskeletal:  Positive for arthralgias and back pain. Negative for gait problem, joint swelling and myalgias.   Skin:  Negative for rash.   Allergic/Immunologic: Negative for environmental allergies and food allergies.   Neurological:  Negative for dizziness, syncope, weakness, light-headedness, numbness and headaches.   Psychiatric/Behavioral:  Negative for dysphoric mood and sleep disturbance. The patient is not nervous/anxious.        Objective   /64   Pulse 86   Temp 36.2 °C (97.2 °F)   Ht 1.607 m (5' 3.25\")   Wt 101 kg (223 lb)   SpO2 96%   BMI 39.19 kg/m²     Physical Exam  Vitals and nursing note reviewed.   Constitutional:       Appearance: Normal appearance.   HENT:      Head: Normocephalic.      Mouth/Throat:      Mouth: Mucous membranes are moist. "   Cardiovascular:      Rate and Rhythm: Normal rate and regular rhythm.      Pulses: Normal pulses.      Heart sounds: Normal heart sounds. No murmur heard.     No friction rub. No gallop.   Pulmonary:      Effort: Pulmonary effort is normal. No respiratory distress.      Breath sounds: Normal breath sounds. No wheezing.   Abdominal:      General: Bowel sounds are normal. There is no distension.      Palpations: Abdomen is soft.      Tenderness: There is no abdominal tenderness.   Musculoskeletal:         General: No deformity. Normal range of motion.   Skin:     General: Skin is warm and dry.      Capillary Refill: Capillary refill takes less than 2 seconds.   Neurological:      General: No focal deficit present.      Mental Status: She is alert and oriented to person, place, and time.   Psychiatric:         Mood and Affect: Mood normal.         Assessment/Plan   Diagnoses and all orders for this visit:  Controlled type 2 diabetes mellitus with complication, without long-term current use of insulin (CMS/HCC)  -     CBC and Auto Differential  -     Comprehensive Metabolic Panel  -     Hemoglobin A1C  -     Albumin, urine, random  Mixed hyperlipidemia  -     Lipid Panel  Acquired hypothyroidism  -     TSH with reflex to Free T4 if abnormal  Viral cardiomyopathy (CMS/HCC)  -     furosemide (Lasix) 20 mg tablet; Take 1 tablet (20 mg) by mouth once daily.

## 2023-10-11 LAB
ALBUMIN SERPL BCP-MCNC: 4.6 G/DL (ref 3.4–5)
ALP SERPL-CCNC: 57 U/L (ref 33–136)
ALT SERPL W P-5'-P-CCNC: 21 U/L (ref 7–45)
ANION GAP SERPL CALC-SCNC: 18 MMOL/L (ref 10–20)
AST SERPL W P-5'-P-CCNC: 16 U/L (ref 9–39)
BASOPHILS # BLD AUTO: 0.09 X10*3/UL (ref 0–0.1)
BASOPHILS NFR BLD AUTO: 1.1 %
BILIRUB SERPL-MCNC: 0.7 MG/DL (ref 0–1.2)
BUN SERPL-MCNC: 32 MG/DL (ref 6–23)
CALCIUM SERPL-MCNC: 10.5 MG/DL (ref 8.6–10.6)
CHLORIDE SERPL-SCNC: 100 MMOL/L (ref 98–107)
CHOLEST SERPL-MCNC: 169 MG/DL (ref 0–199)
CHOLESTEROL/HDL RATIO: 3.1
CO2 SERPL-SCNC: 26 MMOL/L (ref 21–32)
CREAT SERPL-MCNC: 1.19 MG/DL (ref 0.5–1.05)
EOSINOPHIL # BLD AUTO: 0.19 X10*3/UL (ref 0–0.4)
EOSINOPHIL NFR BLD AUTO: 2.3 %
ERYTHROCYTE [DISTWIDTH] IN BLOOD BY AUTOMATED COUNT: 14.2 % (ref 11.5–14.5)
EST. AVERAGE GLUCOSE BLD GHB EST-MCNC: 212 MG/DL
GFR SERPL CREATININE-BSD FRML MDRD: 48 ML/MIN/1.73M*2
GLUCOSE SERPL-MCNC: 235 MG/DL (ref 74–99)
HBA1C MFR BLD: 9 %
HCT VFR BLD AUTO: 45.4 % (ref 36–46)
HDLC SERPL-MCNC: 54.1 MG/DL
HGB BLD-MCNC: 14.1 G/DL (ref 12–16)
IMM GRANULOCYTES # BLD AUTO: 0.01 X10*3/UL (ref 0–0.5)
IMM GRANULOCYTES NFR BLD AUTO: 0.1 % (ref 0–0.9)
LDLC SERPL CALC-MCNC: 55 MG/DL (ref 140–190)
LYMPHOCYTES # BLD AUTO: 2.37 X10*3/UL (ref 0.8–3)
LYMPHOCYTES NFR BLD AUTO: 29 %
MCH RBC QN AUTO: 30.3 PG (ref 26–34)
MCHC RBC AUTO-ENTMCNC: 31.1 G/DL (ref 32–36)
MCV RBC AUTO: 97 FL (ref 80–100)
MONOCYTES # BLD AUTO: 0.97 X10*3/UL (ref 0.05–0.8)
MONOCYTES NFR BLD AUTO: 11.9 %
NEUTROPHILS # BLD AUTO: 4.55 X10*3/UL (ref 1.6–5.5)
NEUTROPHILS NFR BLD AUTO: 55.6 %
NON HDL CHOLESTEROL: 115 MG/DL (ref 0–149)
NRBC BLD-RTO: 0 /100 WBCS (ref 0–0)
PLATELET # BLD AUTO: 320 X10*3/UL (ref 150–450)
PMV BLD AUTO: 9 FL (ref 7.5–11.5)
POTASSIUM SERPL-SCNC: 5.1 MMOL/L (ref 3.5–5.3)
PROT SERPL-MCNC: 7.5 G/DL (ref 6.4–8.2)
RBC # BLD AUTO: 4.66 X10*6/UL (ref 4–5.2)
SODIUM SERPL-SCNC: 139 MMOL/L (ref 136–145)
TRIGL SERPL-MCNC: 299 MG/DL (ref 0–149)
TSH SERPL-ACNC: 1.5 MIU/L (ref 0.44–3.98)
VLDL: 60 MG/DL (ref 0–40)
WBC # BLD AUTO: 8.2 X10*3/UL (ref 4.4–11.3)

## 2023-10-19 ENCOUNTER — TELEPHONE (OUTPATIENT)
Dept: PRIMARY CARE | Facility: CLINIC | Age: 73
End: 2023-10-19
Payer: MEDICARE

## 2023-10-19 DIAGNOSIS — Z95.810 PRESENCE OF AUTOMATIC CARDIOVERTER/DEFIBRILLATOR (AICD): ICD-10-CM

## 2023-10-19 DIAGNOSIS — I49.01 VF (VENTRICULAR FIBRILLATION) (MULTI): Primary | ICD-10-CM

## 2023-10-19 DIAGNOSIS — B33.24 VIRAL CARDIOMYOPATHY (MULTI): ICD-10-CM

## 2023-10-19 NOTE — TELEPHONE ENCOUNTER
----- Message from January Garcia DO sent at 10/18/2023  9:53 AM EDT -----  LABS ARE GOOD EXCEPT PERTAINING TO HER DIABETES WHICH IS NOT CONTROLLED  RESUME A BETTER LOWER SUGAR DIET /PORTION SIZED ON THE CARBOHYDRATES, BREADS AND PASTA AND FOLLOW IN 3 MONTHS

## 2023-10-20 RX ORDER — NAPROXEN SODIUM 220 MG/1
81 TABLET, FILM COATED ORAL DAILY
Qty: 90 TABLET | Refills: 3 | Status: SHIPPED | OUTPATIENT
Start: 2023-10-20 | End: 2023-11-02 | Stop reason: WASHOUT

## 2023-10-20 RX ORDER — FUROSEMIDE 20 MG/1
20 TABLET ORAL DAILY
Qty: 90 TABLET | Refills: 3 | Status: SHIPPED | OUTPATIENT
Start: 2023-10-20

## 2023-10-23 ENCOUNTER — OFFICE VISIT (OUTPATIENT)
Dept: ORTHOPEDIC SURGERY | Facility: CLINIC | Age: 73
End: 2023-10-23
Payer: MEDICARE

## 2023-10-23 DIAGNOSIS — M54.12 CERVICAL RADICULITIS: ICD-10-CM

## 2023-10-23 DIAGNOSIS — M96.0 PSEUDARTHROSIS AFTER FUSION OR ARTHRODESIS: Primary | ICD-10-CM

## 2023-10-23 DIAGNOSIS — R79.1 ABNORMAL COAGULATION PROFILE: ICD-10-CM

## 2023-10-23 DIAGNOSIS — M47.12 CERVICAL SPONDYLOSIS WITH MYELOPATHY: ICD-10-CM

## 2023-10-23 PROCEDURE — 1160F RVW MEDS BY RX/DR IN RCRD: CPT | Performed by: ORTHOPAEDIC SURGERY

## 2023-10-23 PROCEDURE — 1126F AMNT PAIN NOTED NONE PRSNT: CPT | Performed by: ORTHOPAEDIC SURGERY

## 2023-10-23 PROCEDURE — 99214 OFFICE O/P EST MOD 30 MIN: CPT | Performed by: ORTHOPAEDIC SURGERY

## 2023-10-23 PROCEDURE — 3048F LDL-C <100 MG/DL: CPT | Performed by: ORTHOPAEDIC SURGERY

## 2023-10-23 PROCEDURE — 1159F MED LIST DOCD IN RCRD: CPT | Performed by: ORTHOPAEDIC SURGERY

## 2023-10-23 PROCEDURE — 3052F HG A1C>EQUAL 8.0%<EQUAL 9.0%: CPT | Performed by: ORTHOPAEDIC SURGERY

## 2023-10-23 PROCEDURE — 1036F TOBACCO NON-USER: CPT | Performed by: ORTHOPAEDIC SURGERY

## 2023-10-23 PROCEDURE — 3008F BODY MASS INDEX DOCD: CPT | Performed by: ORTHOPAEDIC SURGERY

## 2023-10-23 RX ORDER — SODIUM CHLORIDE, SODIUM LACTATE, POTASSIUM CHLORIDE, CALCIUM CHLORIDE 600; 310; 30; 20 MG/100ML; MG/100ML; MG/100ML; MG/100ML
100 INJECTION, SOLUTION INTRAVENOUS CONTINUOUS
Status: CANCELLED | OUTPATIENT
Start: 2023-10-23

## 2023-10-23 RX ORDER — ACETAMINOPHEN 325 MG/1
975 TABLET ORAL ONCE
Status: CANCELLED | OUTPATIENT
Start: 2023-10-23 | End: 2023-10-23

## 2023-10-23 RX ORDER — GABAPENTIN 300 MG/1
600 CAPSULE ORAL ONCE
Status: CANCELLED | OUTPATIENT
Start: 2023-10-23 | End: 2023-10-23

## 2023-10-23 NOTE — PROGRESS NOTES
Patient returns for follow-up.  She had her CT scan done.    She complains of progressive myelopathic symptoms, most of these resolved following her operation in January of this year.    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    I reviewed the CT scan, this shows complete extrusion of the cage anteriorly.  There is no anterior or posterior fusion from C5-7.    We discussed strategies at this point, I believe she needs revision of her anterior instrumentation as well as posterior fixation to prevent from further pseudoarthrosis.  I recommend addressing her junctional stenosis from a posterior approach.    She understands that the goal of surgery is to stop progression of her disease, there is no guarantee she has improvement, but I do suspect that her recurrent myelopathy will improve as it did before.    I discussed the risks of surgery including bleeding, infection, paralysis, dysphagia, hoarseness, biceps palsy, muscle weakness, CSF leak, bowel or bladder dysfunction, incomplete resolution of pain or numbness, possible worsening of preoperative symptoms, DVT/PE, heart attack, stroke, and other unforeseen medical and anesthesia complications.  She verbalized understanding of the risks, benefits, and alternatives to surgical treatment.  The plan will be for removal of anterior instrumentation, revision anterior C5-7 fusion with expandable titanium cage (NuVasive X core mini), followed by C3-T1 posterior fusion with NuVasive viewpoint 2 instrumentation, C4-5 laminectomy.  Surgery was scheduled for November 14 at [facility].    @This note was dictated using speech recognition software and was not corrected for spelling or grammatical errors@

## 2023-10-24 PROBLEM — M96.0 PSEUDARTHROSIS AFTER FUSION OR ARTHRODESIS: Status: ACTIVE | Noted: 2023-10-23

## 2023-10-24 PROBLEM — M47.12 CERVICAL SPONDYLOSIS WITH MYELOPATHY: Status: ACTIVE | Noted: 2023-10-23

## 2023-10-30 ENCOUNTER — HOSPITAL ENCOUNTER (OUTPATIENT)
Dept: CARDIOLOGY | Facility: CLINIC | Age: 73
Discharge: HOME | End: 2023-10-30
Payer: MEDICARE

## 2023-10-30 DIAGNOSIS — M47.12 CERVICAL ARTHRITIS WITH MYELOPATHY: ICD-10-CM

## 2023-10-30 DIAGNOSIS — Z95.810 PRESENCE OF AUTOMATIC CARDIOVERTER/DEFIBRILLATOR (AICD): ICD-10-CM

## 2023-10-30 DIAGNOSIS — I49.01 VF (VENTRICULAR FIBRILLATION) (MULTI): ICD-10-CM

## 2023-10-30 DIAGNOSIS — M43.10 ACQUIRED SPONDYLOLISTHESIS: ICD-10-CM

## 2023-10-30 PROCEDURE — 93295 DEV INTERROG REMOTE 1/2/MLT: CPT | Performed by: INTERNAL MEDICINE

## 2023-10-30 PROCEDURE — 93296 REM INTERROG EVL PM/IDS: CPT

## 2023-10-30 RX ORDER — MELOXICAM 7.5 MG/1
7.5 TABLET ORAL DAILY
Qty: 90 TABLET | Refills: 0 | Status: SHIPPED | OUTPATIENT
Start: 2023-10-30 | End: 2023-11-15 | Stop reason: HOSPADM

## 2023-10-31 ENCOUNTER — APPOINTMENT (OUTPATIENT)
Dept: PREADMISSION TESTING | Facility: HOSPITAL | Age: 73
End: 2023-10-31
Payer: MEDICARE

## 2023-11-02 ENCOUNTER — HOSPITAL ENCOUNTER (OUTPATIENT)
Dept: CARDIOLOGY | Facility: CLINIC | Age: 73
Discharge: HOME | End: 2023-11-02
Payer: MEDICARE

## 2023-11-02 ENCOUNTER — OFFICE VISIT (OUTPATIENT)
Dept: CARDIOLOGY | Facility: CLINIC | Age: 73
End: 2023-11-02
Payer: MEDICARE

## 2023-11-02 VITALS
WEIGHT: 263 LBS | BODY MASS INDEX: 46.6 KG/M2 | OXYGEN SATURATION: 95 % | SYSTOLIC BLOOD PRESSURE: 124 MMHG | HEIGHT: 63 IN | HEART RATE: 65 BPM | DIASTOLIC BLOOD PRESSURE: 64 MMHG

## 2023-11-02 DIAGNOSIS — I51.9 LV DYSFUNCTION: Primary | ICD-10-CM

## 2023-11-02 DIAGNOSIS — I42.9 CARDIOMYOPATHY, UNSPECIFIED TYPE (MULTI): ICD-10-CM

## 2023-11-02 DIAGNOSIS — M96.0 PSEUDARTHROSIS AFTER FUSION OR ARTHRODESIS: ICD-10-CM

## 2023-11-02 DIAGNOSIS — Z95.810 ICD (IMPLANTABLE CARDIOVERTER-DEFIBRILLATOR) IN PLACE: ICD-10-CM

## 2023-11-02 LAB
ATRIAL RATE: 79 BPM
P AXIS: 61 DEGREES
P OFFSET: 180 MS
P ONSET: 128 MS
PR INTERVAL: 164 MS
Q ONSET: 210 MS
QRS COUNT: 13 BEATS
QRS DURATION: 114 MS
QT INTERVAL: 404 MS
QTC CALCULATION(BAZETT): 463 MS
QTC FREDERICIA: 443 MS
R AXIS: -37 DEGREES
T AXIS: 16 DEGREES
T OFFSET: 412 MS
VENTRICULAR RATE: 79 BPM

## 2023-11-02 PROCEDURE — 99214 OFFICE O/P EST MOD 30 MIN: CPT | Performed by: INTERNAL MEDICINE

## 2023-11-02 PROCEDURE — 3048F LDL-C <100 MG/DL: CPT | Performed by: INTERNAL MEDICINE

## 2023-11-02 PROCEDURE — 3078F DIAST BP <80 MM HG: CPT | Performed by: INTERNAL MEDICINE

## 2023-11-02 PROCEDURE — 3008F BODY MASS INDEX DOCD: CPT | Performed by: INTERNAL MEDICINE

## 2023-11-02 PROCEDURE — 1160F RVW MEDS BY RX/DR IN RCRD: CPT | Performed by: INTERNAL MEDICINE

## 2023-11-02 PROCEDURE — 1126F AMNT PAIN NOTED NONE PRSNT: CPT | Performed by: INTERNAL MEDICINE

## 2023-11-02 PROCEDURE — 3052F HG A1C>EQUAL 8.0%<EQUAL 9.0%: CPT | Performed by: INTERNAL MEDICINE

## 2023-11-02 PROCEDURE — 3074F SYST BP LT 130 MM HG: CPT | Performed by: INTERNAL MEDICINE

## 2023-11-02 PROCEDURE — 93005 ELECTROCARDIOGRAM TRACING: CPT

## 2023-11-02 PROCEDURE — 1159F MED LIST DOCD IN RCRD: CPT | Performed by: INTERNAL MEDICINE

## 2023-11-02 PROCEDURE — 93010 ELECTROCARDIOGRAM REPORT: CPT | Performed by: INTERNAL MEDICINE

## 2023-11-02 PROCEDURE — 99214 OFFICE O/P EST MOD 30 MIN: CPT | Mod: PO | Performed by: INTERNAL MEDICINE

## 2023-11-02 PROCEDURE — 1036F TOBACCO NON-USER: CPT | Performed by: INTERNAL MEDICINE

## 2023-11-02 ASSESSMENT — PATIENT HEALTH QUESTIONNAIRE - PHQ9
SUM OF ALL RESPONSES TO PHQ9 QUESTIONS 1 AND 2: 0
1. LITTLE INTEREST OR PLEASURE IN DOING THINGS: NOT AT ALL
1. LITTLE INTEREST OR PLEASURE IN DOING THINGS: NOT AT ALL
SUM OF ALL RESPONSES TO PHQ9 QUESTIONS 1 AND 2: 0
2. FEELING DOWN, DEPRESSED OR HOPELESS: NOT AT ALL
2. FEELING DOWN, DEPRESSED OR HOPELESS: NOT AT ALL

## 2023-11-02 ASSESSMENT — COLUMBIA-SUICIDE SEVERITY RATING SCALE - C-SSRS: 1. IN THE PAST MONTH, HAVE YOU WISHED YOU WERE DEAD OR WISHED YOU COULD GO TO SLEEP AND NOT WAKE UP?: NO

## 2023-11-02 NOTE — PATIENT INSTRUCTIONS
Follow up in six months  Continue current medications  Lab work was done in October  Cleared for spine surgery in November

## 2023-11-02 NOTE — PROGRESS NOTES
Subjective   Eulogio Cruz is a 73 y.o. female.    Chief Complaint:  EULOGIO CRUZ is being seen for a 4 month follow-up of cardiomyopathy, dyslipidemia, dyspnea, hypertension, LVD and a routine medication evaluation.   HPI  This is a 72 y/o female here today for a six month Cardiology follow up. She denies chest pain, heart palpitations, or pedal edema. Does get sob with exertion. An Echocardiogram was done in January- see report. She will have cervical spine surgery in November. Reviewed lab work results and current medications. Had a Cardiac cath in 2021 which showed mild non obstructive CAD- see report. An EKG was done today.    Review of Systems   All other systems reviewed and are negative.      Objective   Physical Exam  General: Pleasant, 72 y/o female in no obvious distress.  HEENT: Normal EOMs without thyromegaly or lymphadenopathy.  Lungs: Clear with good air movement no crackles or wheezing.  Carotid: Normal JVP and carotid upstrokes without bruit.  Cardiac: There is a normal S1 and S2 with regular rhythm Has 1/6 systolic murmur  Abdomen: Non-tender with normal bowel sounds and no bruits.  Extremities: With trace lower leg edema. Skin: No acute rash.  Neuro: Intact without focal motor deficit.  Vascular: Normal radial pulses bilaterally. Normal distal pulses bilaterally      Lab Review:   Office Visit on 10/10/2023   Component Date Value    WBC 10/10/2023 8.2     nRBC 10/10/2023 0.0     RBC 10/10/2023 4.66     Hemoglobin 10/10/2023 14.1     Hematocrit 10/10/2023 45.4     MCV 10/10/2023 97     MCH 10/10/2023 30.3     MCHC 10/10/2023 31.1 (L)     RDW 10/10/2023 14.2     Platelets 10/10/2023 320     MPV 10/10/2023 9.0     Neutrophils % 10/10/2023 55.6     Immature Granulocytes %,* 10/10/2023 0.1     Lymphocytes % 10/10/2023 29.0     Monocytes % 10/10/2023 11.9     Eosinophils % 10/10/2023 2.3     Basophils % 10/10/2023 1.1     Neutrophils Absolute 10/10/2023 4.55     Immature Granulocytes Ab* 10/10/2023  0.01     Lymphocytes Absolute 10/10/2023 2.37     Monocytes Absolute 10/10/2023 0.97 (H)     Eosinophils Absolute 10/10/2023 0.19     Basophils Absolute 10/10/2023 0.09     Glucose 10/10/2023 235 (H)     Sodium 10/10/2023 139     Potassium 10/10/2023 5.1     Chloride 10/10/2023 100     Bicarbonate 10/10/2023 26     Anion Gap 10/10/2023 18     Urea Nitrogen 10/10/2023 32 (H)     Creatinine 10/10/2023 1.19 (H)     eGFR 10/10/2023 48 (L)     Calcium 10/10/2023 10.5     Albumin 10/10/2023 4.6     Alkaline Phosphatase 10/10/2023 57     Total Protein 10/10/2023 7.5     AST 10/10/2023 16     Bilirubin, Total 10/10/2023 0.7     ALT 10/10/2023 21     Cholesterol 10/10/2023 169     HDL-Cholesterol 10/10/2023 54.1     Cholesterol/HDL Ratio 10/10/2023 3.1     LDL Calculated 10/10/2023 55 (L)     VLDL 10/10/2023 60 (H)     Triglycerides 10/10/2023 299 (H)     Non HDL Cholesterol 10/10/2023 115     Hemoglobin A1C 10/10/2023 9.0 (H)     Estimated Average Glucose 10/10/2023 212     Thyroid Stimulating Horm* 10/10/2023 1.50     POC Urine Albumin 10/10/2023 10     POC Urine Creatinine 10/10/2023 50     POC ALBUMIN /CREATININE * 10/10/2023 30 - 300 (A)        Assessment/Plan   1. Low-grade nonobstructive coronary artery disease.     2. Nonischemic congestive cardiomyopathy. This patient was seen by her primary care physician in 2021 and had an abnormal EKG performed. Based on this she was scheduled to have a echocardiogram on 8/6/2021 which showed a moderately severe reduction in the patient's LV ejection fraction at 30% with 2+ moderate mitral valve regurgitation. Patient was subsequently seen by cardiology and underwent cardiac catheterization on 8/30/2021 which demonstrated low-grade coronary artery disease. As such the patient was diagnosed as having a nonischemic congestive cardiomyopathy. Of note she had had an echocardiogram performed in 2013 showing a preserved LV ejection fraction. Clinically the patient is doing well. She  did have a single lead ICD implanted 5/11/2022 prophylactically which has not discharged. She did have a repeat echocardiogram performed on 3/28/2022 again demonstrating an LV ejection fraction of only 25-30% with moderate to severe left ventricular chamber enlargement. Clinically patient is doing well without any significant shortness of breath. Blood pressure is in the lower range of normal on the current entresto 20/26 mg twice daily and the metoprolol ER 50 mg daily. Patient will be started on Jardiance 10 mg daily and the dose of metoprolol ER will be increased from 50 to 100 mg daily to improve beta-blockade. She will return in 4 months for routine follow-up. Her baseline EKG was shows sinus rhythm with a left anterior hemiblock.  EKG office visit 11/2/2023 includes sinus rhythm left anterior hemiblock occasional PVCs.     3. Status post prophylactic single-lead ICD implantation 5/11/2022. Continue follow-up in device clinic.     4. Hypertension. Adequate control on current therapy but will increase the dose of Toprol-XL as therapy for her nonischemic cardiomyopathy.     5. Type 2 diabetes. The patient's home glucose readings have typically been been between 190â€“240. As noted above she will be started on Jardiance 10 mg daily to supplement her current therapy.  Patient unable to tolerate Jardiance due to the yeast infection genital itch treated with an internal cream.  Lab work 10/10/2023 included a SMA panel with cholesterol 235 and the glycohemoglobin was 9.0% TSH 1.50.     6. Obesity.     7. Hypothyroidism.     8. Status post bilateral total knee replacements.     9. Status post multiple (4) lumbosacral spine surgeries from T9-L5.     10. Status post bilateral tubal ligation.    11.  Status post cervical spine surgery C5-C6-C7.  The patient's instrumentation evidently has become dislodged and there was some concern about esophageal penetration.  The patient currently has a plastic brace anteriorly and  posteriorly the patient will have a C3-4-5 6 and 7 fusion by Dr. Ravinder Hawkins.      12.  Hyperlipidemia.  Patient is on atorvastatin 40 mg daily adequate results cholesterol 169 LDL 55 HDL 54 triglycerides 299.

## 2023-11-06 ENCOUNTER — PRE-ADMISSION TESTING (OUTPATIENT)
Dept: PREADMISSION TESTING | Facility: HOSPITAL | Age: 73
End: 2023-11-06
Payer: MEDICARE

## 2023-11-06 VITALS
OXYGEN SATURATION: 96 % | TEMPERATURE: 98 F | DIASTOLIC BLOOD PRESSURE: 71 MMHG | HEART RATE: 86 BPM | HEIGHT: 63 IN | BODY MASS INDEX: 40.27 KG/M2 | SYSTOLIC BLOOD PRESSURE: 107 MMHG | WEIGHT: 227.29 LBS

## 2023-11-06 DIAGNOSIS — Z01.818 PREOPERATIVE TESTING: Primary | ICD-10-CM

## 2023-11-06 DIAGNOSIS — M54.12 CERVICAL RADICULITIS: ICD-10-CM

## 2023-11-06 DIAGNOSIS — R79.1 ABNORMAL COAGULATION PROFILE: ICD-10-CM

## 2023-11-06 DIAGNOSIS — M96.0 PSEUDARTHROSIS AFTER FUSION OR ARTHRODESIS: ICD-10-CM

## 2023-11-06 LAB
ABO GROUP (TYPE) IN BLOOD: NORMAL
ANION GAP SERPL CALC-SCNC: 19 MMOL/L (ref 10–20)
ANTIBODY SCREEN: NORMAL
APTT PPP: 30 SECONDS (ref 27–38)
BASOPHILS # BLD AUTO: 0.09 X10*3/UL (ref 0–0.1)
BASOPHILS NFR BLD AUTO: 1.1 %
BUN SERPL-MCNC: 23 MG/DL (ref 6–23)
CALCIUM SERPL-MCNC: 9.7 MG/DL (ref 8.6–10.6)
CHLORIDE SERPL-SCNC: 102 MMOL/L (ref 98–107)
CO2 SERPL-SCNC: 24 MMOL/L (ref 21–32)
CREAT SERPL-MCNC: 0.93 MG/DL (ref 0.5–1.05)
EOSINOPHIL # BLD AUTO: 0.25 X10*3/UL (ref 0–0.4)
EOSINOPHIL NFR BLD AUTO: 3.1 %
ERYTHROCYTE [DISTWIDTH] IN BLOOD BY AUTOMATED COUNT: 13.5 % (ref 11.5–14.5)
GFR SERPL CREATININE-BSD FRML MDRD: 65 ML/MIN/1.73M*2
GLUCOSE SERPL-MCNC: 131 MG/DL (ref 74–99)
HCT VFR BLD AUTO: 42.7 % (ref 36–46)
HGB BLD-MCNC: 13.8 G/DL (ref 12–16)
IMM GRANULOCYTES # BLD AUTO: 0.02 X10*3/UL (ref 0–0.5)
IMM GRANULOCYTES NFR BLD AUTO: 0.2 % (ref 0–0.9)
INR PPP: 1.1 (ref 0.9–1.1)
LYMPHOCYTES # BLD AUTO: 2.8 X10*3/UL (ref 0.8–3)
LYMPHOCYTES NFR BLD AUTO: 34.3 %
MCH RBC QN AUTO: 30.3 PG (ref 26–34)
MCHC RBC AUTO-ENTMCNC: 32.3 G/DL (ref 32–36)
MCV RBC AUTO: 94 FL (ref 80–100)
MONOCYTES # BLD AUTO: 1 X10*3/UL (ref 0.05–0.8)
MONOCYTES NFR BLD AUTO: 12.3 %
NEUTROPHILS # BLD AUTO: 4 X10*3/UL (ref 1.6–5.5)
NEUTROPHILS NFR BLD AUTO: 49 %
NRBC BLD-RTO: 0 /100 WBCS (ref 0–0)
PLATELET # BLD AUTO: 282 X10*3/UL (ref 150–450)
POTASSIUM SERPL-SCNC: 4.1 MMOL/L (ref 3.5–5.3)
PROTHROMBIN TIME: 11.9 SECONDS (ref 9.8–12.8)
RBC # BLD AUTO: 4.56 X10*6/UL (ref 4–5.2)
RH FACTOR (ANTIGEN D): NORMAL
SODIUM SERPL-SCNC: 141 MMOL/L (ref 136–145)
WBC # BLD AUTO: 8.2 X10*3/UL (ref 4.4–11.3)

## 2023-11-06 PROCEDURE — 99215 OFFICE O/P EST HI 40 MIN: CPT | Performed by: NURSE PRACTITIONER

## 2023-11-06 PROCEDURE — 85025 COMPLETE CBC W/AUTO DIFF WBC: CPT | Performed by: ORTHOPAEDIC SURGERY

## 2023-11-06 PROCEDURE — 85610 PROTHROMBIN TIME: CPT | Performed by: ORTHOPAEDIC SURGERY

## 2023-11-06 PROCEDURE — 36415 COLL VENOUS BLD VENIPUNCTURE: CPT

## 2023-11-06 PROCEDURE — 80048 BASIC METABOLIC PNL TOTAL CA: CPT | Performed by: ORTHOPAEDIC SURGERY

## 2023-11-06 PROCEDURE — 87081 CULTURE SCREEN ONLY: CPT | Performed by: NURSE PRACTITIONER

## 2023-11-06 PROCEDURE — 86901 BLOOD TYPING SEROLOGIC RH(D): CPT | Performed by: ORTHOPAEDIC SURGERY

## 2023-11-06 RX ORDER — GABAPENTIN 300 MG/1
300 CAPSULE ORAL 2 TIMES DAILY
COMMUNITY
End: 2023-11-21 | Stop reason: SDUPTHER

## 2023-11-06 RX ORDER — CHLORHEXIDINE GLUCONATE 40 MG/ML
SOLUTION TOPICAL DAILY PRN
Qty: 473 ML | Refills: 0 | Status: SHIPPED | OUTPATIENT
Start: 2023-11-09 | End: 2023-11-07 | Stop reason: ENTERED-IN-ERROR

## 2023-11-06 RX ORDER — CHOLECALCIFEROL (VITAMIN D3) 125 MCG
125 CAPSULE ORAL DAILY
COMMUNITY

## 2023-11-06 RX ORDER — CHLORHEXIDINE GLUCONATE ORAL RINSE 1.2 MG/ML
15 SOLUTION DENTAL AS NEEDED
Qty: 120 ML | Refills: 0 | Status: SHIPPED | OUTPATIENT
Start: 2023-11-09 | End: 2023-11-07 | Stop reason: ENTERED-IN-ERROR

## 2023-11-06 ASSESSMENT — ENCOUNTER SYMPTOMS
ARTHRALGIAS: 1
CARDIOVASCULAR NEGATIVE: 1
CONSTITUTIONAL NEGATIVE: 1
NECK PAIN: 1
EYES NEGATIVE: 1
NEUROLOGICAL NEGATIVE: 1
VISUAL CHANGE: 1
GASTROINTESTINAL NEGATIVE: 1
ENDOCRINE NEGATIVE: 1
RESPIRATORY NEGATIVE: 1
NECK NEGATIVE: 1

## 2023-11-06 ASSESSMENT — DUKE ACTIVITY SCORE INDEX (DASI)
CAN YOU PARTICIPATE IN MODERATE RECREATIONAL ACTIVITIES LIKE GOLF, BOWLING, DANCING, DOUBLES TENNIS OR THROWING A BASEBALL OR FOOTBALL: NO
CAN YOU WALK A BLOCK OR TWO ON LEVEL GROUND: NO
CAN YOU CLIMB A FLIGHT OF STAIRS OR WALK UP A HILL: YES
CAN YOU RUN A SHORT DISTANCE: NO
CAN YOU PARTICIPATE IN STRENOUS SPORTS LIKE SWIMMING, SINGLES TENNIS, FOOTBALL, BASKETBALL, OR SKIING: NO
CAN YOU DO YARD WORK LIKE RAKING LEAVES, WEEDING OR PUSHING A MOWER: YES
CAN YOU DO MODERATE WORK AROUND THE HOUSE LIKE VACUUMING, SWEEPING FLOORS OR CARRYING GROCERIES: YES
CAN YOU DO LIGHT WORK AROUND THE HOUSE LIKE DUSTING OR WASHING DISHES: YES
TOTAL_SCORE: 25.95
CAN YOU DO HEAVY WORK AROUND THE HOUSE LIKE SCRUBBING FLOORS OR LIFTING AND MOVING HEAVY FURNITURE: NO
CAN YOU WALK INDOORS, SUCH AS AROUND YOUR HOUSE: YES
CAN YOU HAVE SEXUAL RELATIONS: YES
DASI METS SCORE: 5.9
CAN YOU TAKE CARE OF YOURSELF (EAT, DRESS, BATHE, OR USE TOILET): YES

## 2023-11-06 ASSESSMENT — CHADS2 SCORE
HYPERTENSION: YES
CHF: NO
DIABETES: NO
CHADS2 SCORE: 1
AGE GREATER THAN OR EQUAL TO 75: NO
PRIOR STROKE OR TIA OR THROMBOEMBOLISM: NO

## 2023-11-06 ASSESSMENT — LIFESTYLE VARIABLES: SMOKING_STATUS: NONSMOKER

## 2023-11-06 NOTE — PREPROCEDURE INSTRUCTIONS
NPO Instructions:    Do not eat any food after midnight the night before your surgery/procedure.  You may have up to TEN ounces of clear liquids until TWO hours before surgery/procedure. This includes water, black tea/coffee, (no milk or cream), apple juice, and/or electrolyte drinks (Gatorade).  Yo may chew gum up to TWO hours before your surgery/procedure.    Additional Instructions:     We have sent a prescription for Hibiclens soap and Peridex mouth wash to your preferred pharmacy.  If you have not already, Please  your prescription and start using five days before surgery.  Follow the instruction sheet provided to you at your CPM/PAT appointment.    Avoid herbal supplements, multivitamins and NSAIDS (non-steroidal anti-inflammatory drugs) such as Advil, Aleve, Ibuprofen, Naproxen, Excedrin, Meloxicam or Celebrex for at least 7 days prior to surgery. May take Tylenol as needed.    Please bring CPAP/BIPAP with you the day of surgery.     Seven/Six Days before Surgery:  Review your medication instructions, stop indicated medications    Day of Surgery:  Review your medication instructions, take indicated medications  Wear comfortable loose fitting clothing  Do not use moisturizers, creams, lotions or perfume  All jewelry and valuables should be left at home    Presley Kern [facility]  [facility]  Ncric-[phone]  [phone]  [email or web address]

## 2023-11-06 NOTE — CPM/PAT H&P
CPM/PAT Evaluation       Name: Joana Elizabeth (Joana Elizabeth)  /Age: 1950/73 y.o.     Visit Type:   In-Person       Chief Complaint: cervical stenosis    HPI    The patient is a 73 year old female with complaints of progressive myelopathic symptoms. 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 She notes some left shoulder and neck pain. She presents today for perioperative evaluation in anticipation of revision C5-7 anteior fusion, C3-T1 posterior cervical fusion, C4-5 laminectomy on 23 with Dr. Hawkins.    Past Medical History:   Diagnosis Date    Abnormal levels of other serum enzymes 2020    Abnormal AST and ALT    Anemia, unspecified 2018    Postoperative anemia    Calculus of gallbladder without cholecystitis without obstruction 2019    Gallstones    Cardiomyopathy (CMS/Formerly [facility])     s/p ICD, last seen by Dr. Maravilla on 2023    Cataract     Chronic maxillary sinusitis 2018    Right maxillary sinusitis    COVID-19 10/02/2020    COVID-19 virus infection    Depression     Diabetes mellitus (CMS/HCC)     Dorsalgia, unspecified 10/15/2019    Mid-back pain, acute    Effusion, unspecified hand 10/14/2016    Swelling of hand joint    Essential (primary) hypertension 2021    Benign essential HTN    Gastroparesis 2019    Gastroparesis    History of falling 2021    Status post fall    Hyperlipidemia     Hypothyroidism     Localized edema 2018    Leg edema    Occipital neuralgia 02/10/2017    Occipital neuralgia of right side    Osteomyelitis, unspecified (CMS/HCC) 2019    Knee osteomyelits, right    Other amnesia 2020    Memory problem    Other intervertebral disc displacement, lumbar region 2020    Disc displacement, lumbar    Other postherpetic nervous system involvement 2018    Postherpetic neuralgia    Other specified disorders of kidney and ureter 2019    Left renal mass    Personal history of other diseases  of the circulatory system 2021    History of carotid artery stenosis    Personal history of other diseases of the digestive system 2019    History of biliary colic    Personal history of other infectious and parasitic diseases     History of scarlet fever    Personal history of other infectious and parasitic diseases 2017    History of herpes zoster    Personal history of other specified conditions 2015    History of right flank pain    Pneumonia, unspecified organism 2019    Left lower lobe pneumonia    Radiculopathy, cervical region 2020    Cervical radiculopathy    Radiculopathy, lumbar region 2020    Lumbar radiculitis    Repeated falls 2018    Recurrent falls    Sleep apnea     Spinal stenosis     cervical, scheduled for surgery 2023    Spondylosis without myelopathy or radiculopathy, cervical region 2017    Spondylosis of cervical region without myelopathy or radiculopathy    Spondylosis without myelopathy or radiculopathy, lumbar region 2020    Lumbar spondylosis    Stricture of artery (CMS/HCC) 2021    Subclavian artery stenosis, right    Trigger finger, right index finger 2021    Trigger index finger of right hand    Trigger finger, right ring finger 2021    Trigger ring finger of right hand    Unspecified asthma, uncomplicated 2020    Asthmatic bronchitis    Vision loss        Past Surgical History:   Procedure Laterality Date    BACK SURGERY  10/12/2017    Back Surgery    BACK SURGERY  2017    Lower Back Surgery    BACK SURGERY  2023    [facility]INA-C5,6,7    CARDIAC CATHETERIZATION       SECTION, CLASSIC  2013     Section    CHOLECYSTECTOMY      COLONOSCOPY      CT GUIDED PERCUTANEOUS BIOPSY MEDIASTINUM  2017    CT GUIDED PERCUTANEOUS BIOPSY MEDIASTINUM 2017 [facility]    DILATION AND CURETTAGE OF UTERUS  2013    Dilation And Curettage    INSERT / REPLACE / REMOVE  PACEMAKER      KNEE ARTHROPLASTY Bilateral     OTHER SURGICAL HISTORY  12/16/2019    Knee arthroscopy    TONSILLECTOMY  05/17/2013    Tonsillectomy    TUBAL LIGATION  05/17/2013    Tubal Ligation    UPPER GASTROINTESTINAL ENDOSCOPY         Patient  reports that she is not currently sexually active.    Family History   Problem Relation Name Age of Onset    Heart disease Mother      Heart disease Father      Prostate cancer Father      No Known Problems Sister      Heart disease Brother      Skin cancer Brother      Lung disease Brother         Allergies   Allergen Reactions    Shellfish Containing Products Anaphylaxis    Amoxicillin-Pot Clavulanate Nausea And Vomiting and Other     GI Intolerance    Codeine Nausea And Vomiting and Other     GI Intolerance    Neomycin Hives    Sulfa (Sulfonamide Antibiotics) Itching, Rash and Other     Vomiting     Tramadol Other and Itching     tongue blistered    Celecoxib Rash       Prior to Admission medications    Medication Sig Start Date End Date Taking? Authorizing Provider   acetaminophen (Tylenol) 325 mg tablet Take 2 tablets (650 mg) by mouth every 6 hours.    Historical Provider, MD   albuterol 90 mcg/actuation inhaler Inhale 1 puff every 4 hours if needed. 12/2/19   Historical Provider, MD   aspirin 81 mg capsule Take 1 tablet by mouth once daily. CHEW AND SWALLOW 7/1/23   Historical Provider, MD   atorvastatin (Lipitor) 40 mg tablet Take 1 tablet (40 mg) by mouth once daily. 6/26/23   GEOFFREY Go-CNP   biotin 10 mg tablet Take 1 tablet (10 mg) by mouth once daily. 8/14/17   Historical Provider, MD   blood sugar diagnostic (Truetrack Test) strip TEST twice a day 7/2/14   Historical Provider, MD   busPIRone (Buspar) 7.5 mg tablet Take 1 tablet (7.5 mg) by mouth twice a day.    Historical Provider, MD   calcium carbonate-vitamin D3 600 mg-20 mcg (800 unit) tablet Caltrate with Vitamin D3 600 mg (1,500 mg)-800 unit tablet    Historical Provider, MD   chlorhexidine  (Hibiclens) 4 % external liquid Apply topically once daily as needed (preoperative) for up to 5 days. Do not start before November 9, 2023. 11/9/23 11/14/23  LIANE Bryant   chlorhexidine (Peridex) 0.12 % solution Use 15 mL in the mouth or throat if needed (preoperative) for up to 5 days. Swish and spit. Do not swallow Do not start before November 9, 2023. 11/9/23 11/14/23  LIANE Bryant   coenzyme Q-10 200 mg capsule Take 1 capsule (200 mg) by mouth once daily. 9/19/22   Historical Provider, MD   cyanocobalamin (Vitamin B-12) 250 mcg tablet Take by mouth.    Historical Provider, MD   Entresto 24-26 mg tablet Take 1 tablet by mouth 2 times a day. 6/26/23   LIANE Go   escitalopram (Lexapro) 20 mg tablet Take 1 tablet (20 mg) by mouth once daily. 6/26/23   LIANE Go   ferrous sulfate 325 (65 Fe) MG tablet Take 1 tablet (65 mg of iron) by mouth 2 times a day.    Historical Provider, MD   fexofenadine (Allegra) 180 mg tablet Take 1 tablet (180 mg) by mouth once daily.    Historical Provider, MD   fluticasone (Flonase) 50 mcg/actuation nasal spray Administer 1 spray into affected nostril(s) once daily.    Historical Provider, MD   folic acid (Folvite) 1 mg tablet Take by mouth. 12/13/18   Historical Provider, MD   furosemide (Lasix) 20 mg tablet Take 1 tablet (20 mg) by mouth once daily. 10/20/23   January Garcia,    gabapentin (Neurontin) 100 mg capsule Take 3 capsules (300 mg) by mouth 2 times a day. 6/26/23   LIANE Go   hydroxychloroquine (Plaquenil) 200 mg tablet Take 1 tablet (200 mg) by mouth 2 times a day. 6/26/23   LIANE Go   Jardiance 10 mg Take 1 tablet (10 mg) by mouth once daily. 6/26/23   LIANE Go   levothyroxine (Synthroid, Levoxyl) 100 mcg tablet Take 1 tablet (100 mcg) by mouth once daily. 6/26/23   GEOFFREY Go-CNP   magnesium oxide (Mag-Ox) 400 mg tablet Take 2 tablets (800 mg) by mouth once daily.  9/19/22   Historical Provider, MD   meloxicam (Mobic) 7.5 mg tablet Take 1 tablet (7.5 mg) by mouth once daily. 10/30/23   Pao Perez MD   metFORMIN (Glucophage) 1,000 mg tablet Take 1 tablet (1,000 mg) by mouth 2 times a day with meals. 6/26/23   GEOFFREY Go-CNP   methocarbamol (Robaxin) 500 mg tablet Take 1 tablet (500 mg) by mouth 3 times a day.    Historical Provider, MD   metoprolol succinate XL (Toprol-XL) 100 mg 24 hr tablet Take 1 tablet (100 mg) by mouth once daily. 6/26/23   LIANE Go   multivit/folic acid/vit K1 (WOMEN'S 50 PLUS ADVANCED ORAL) Take by mouth.    Historical Provider, MD   pantoprazole (ProtoNix) 40 mg EC tablet Take 1 tablet (40 mg) by mouth once daily. 6/26/23   LIANE Go   albuterol 90 mcg/actuation inhaler Inhale 2 puffs. as instructed.  11/2/23  Historical Provider, MD   aspirin 81 mg chewable tablet Chew 1 tablet (81 mg) once daily. 10/20/23 11/2/23  Januaryangela Garcia,    biotin 5 mg tablet Take by mouth.  11/2/23  Historical Provider, MD   chlorhexidine (Hibiclens) 4 % external liquid Use ad directed for preoperative shower 1/5/23 11/2/23  Historical Provider, MD   chlorhexidine (Peridex) 0.12 % solution USE TO RINSE MOUTH WITH WITH 15ML (1 CAPFUL) FOR 30 SECONDS IN THE MORNING AND EVENING AFTER TOOTHBRUSHING. EXPECTORATE AFTER RINSING. DO NOT SWALLOW. 1/5/23 11/2/23  Historical Provider, MD   cholecalciferol (Vitamin D-3) 50 mcg (2,000 unit) capsule Vitamin D3 2,000 unit capsule  11/2/23  Historical Provider, MD   ciprofloxacin (Ciloxan) 0.3 % ophthalmic solution ciprofloxacin 0.3 % eye drops  11/2/23  Historical Provider, MD   clotrimazole-betamethasone (Lotrisone) cream clotrimazole-betamethasone 1 %-0.05 % topical cream  11/2/23  Historical Provider, MD   diclofenac sodium/misoprostol (ARTHROTEC 75 ORAL) Take by mouth 2 times a day.  11/2/23  Historical Provider, MD   diclofenac-misoprostoL (Arthrotec 75)  mg-mcg EC tablet Take 1  tablet by mouth 2 times a day.  11/2/23  Historical Provider, MD   doxycycline (Adoxa) 100 mg tablet Take 1 tablet (100 mg) by mouth 2 times a day. 3/17/23 11/2/23  Historical Provider, MD   ergocalciferol (Vitamin D-2) 1.25 MG (36860 UT) capsule Take 1 capsule (50,000 Units) by mouth once a week.  11/2/23  Historical Provider, MD   WA-B3-F6-C7-Z0-R42-C-Zn 1 mg-1.5 mg- 1.7 mg-50 mg tablet Take by mouth.  11/2/23  Historical Provider, MD   fexofenadine (Allegra Allergy) 60 mg tablet 1 tablet (60 mg) once daily. 8/23/10 11/2/23  Historical Provider, MD   fluocinolone 0.01 % cream fluocinolone 0.01 % topical cream  11/2/23  Historical Provider, MD   gabapentin (Neurontin) 400 mg capsule Take 1 capsule (400 mg) by mouth 3 times a day. 8/8/18 11/2/23  Historical Provider, MD   GABAPENTIN ORAL Take 1 tablet by mouth twice a day.  11/2/23  Historical Provider, MD   HYDROcodone-acetaminophen (Norco) 5-325 mg tablet Take 1 tablet by mouth every 6 hours if needed.  11/2/23  Historical Provider, MD   iron 18 mg tablet Take by mouth. 12/13/18 11/2/23  Historical Provider, MD   iron/C/B12/B6/E/FA/if/senna lf (IRO-PLEX, IRON POLYSACCHARIDE, ORAL) Take 1 tablet by mouth once daily. iron polysaccharide 50 mg oral tablet  11/2/23  Historical Provider, MD   LACTOBACILLUS ACIDOPHILUS ORAL Take 200 mg by mouth twice a day.  11/2/23  Historical Provider, MD   linaGLIPtin (Tradjenta) 5 mg tablet  9/10/18 11/2/23  Historical Provider, MD   mecobalamin, vitamin B12, 5,000 mcg lozenge Take 5,000 Units by mouth once daily.  11/2/23  Historical Provider, MD   methocarbamol (Robaxin) 500 mg tablet TAKE 2 TABLETS BY MOUTH EVERY 8 HOURS TO REDUCE MUSCLE SPASMS  11/2/23  Historical Provider, MD   metoprolol succinate XL (Toprol-XL) 25 mg 24 hr tablet Take 1 tablet (25 mg) by mouth once daily. 8/24/21 11/2/23  Historical Provider, MD   metoprolol succinate XL (Toprol-XL) 50 mg 24 hr tablet Take 1 tablet (50 mg) by mouth once daily.  11/2/23   Historical Provider, MD   multivitamin with minerals (Adults Multivitamin) tablet Take 1 tablet by mouth once daily.  11/2/23  Historical Provider, MD   multivitamin with minerals (multivitamin with folic acid) tablet Take 1 tablet by mouth once daily. 6/21/21 11/2/23  Historical Provider, MD   naproxen (Naprosyn) 500 mg tablet Take 1 tablet (500 mg) by mouth 2 times a day with meals. 4/27/14 11/2/23  Historical Provider, MD   ondansetron (Zofran) 4 mg tablet Take 1 tablet (4 mg) by mouth every 8 hours if needed for nausea or vomiting. 8/18/21 11/2/23  Historical Provider, MD   oxyCODONE (Roxicodone) 5 mg immediate release tablet  1/19/23 11/2/23  Historical Provider, MD   phenylephrine (Sudafed PE) 10 mg tablet Take 1 tablet (10 mg) by mouth 2 times a day as needed for congestion.  11/2/23  Historical Provider, MD   polyethylene glycol (Glycolax, Miralax) 17 gram/dose powder USE 17 GRAMS BY MOUTH TWICE DAILY FOR 14 DAYS TO PREVENT CONSTIPATION. DISSOLVE IN LIQUID AS DIRECTED. 1/18/23 11/2/23  Historical Provider, MD   pravastatin (Pravachol) 40 mg tablet Take 1 tablet (40 mg) by mouth once daily.  11/2/23  Historical Provider, MD   prednisoLONE acetate (Pred-Forte) 1 % ophthalmic suspension prednisolone acetate 1 % eye drops,suspension  11/2/23  Historical Provider, MD   tobramycin-dexamethasone (Tobradex) ophthalmic suspension tobramycin 0.3 %-dexamethasone 0.1 % eye drops,suspension  11/2/23  Historical Provider, MD   ubidecarenone (COENZYME Q10, BULK, MISC) Take 100 mg by mouth.  11/2/23  Historical Provider, MD   vitamin B12-folic acid 0.5-1 mg tablet Take 1 tablet by mouth once daily.  11/2/23  Historical Provider, MD RODRIGUEZ ROS:   Constitutional:   neg    Neuro/Psych:   neg    Eyes:   neg     vision loss   use of corrective lenses  Ears:   neg    Nose:   neg    Mouth:   neg    Throat:   neg    Neck:   neg     neck pain  Cardio:   neg    Respiratory:   neg    Endocrine:   neg    GI:   neg    :   neg     Musculoskeletal:    Uses cane   arthralgias  Hematologic:   neg    Skin:  neg        Physical Exam  Vitals reviewed.   Constitutional:       Appearance: Normal appearance. She is obese.   HENT:      Head: Normocephalic and atraumatic.      Nose: Nose normal.      Mouth/Throat:      Mouth: Mucous membranes are moist.      Pharynx: Oropharynx is clear.   Eyes:      Extraocular Movements: Extraocular movements intact.      Conjunctiva/sclera: Conjunctivae normal.      Pupils: Pupils are equal, round, and reactive to light.   Cardiovascular:      Rate and Rhythm: Normal rate and regular rhythm.      Pulses: Normal pulses.      Heart sounds: Normal heart sounds.   Pulmonary:      Effort: Pulmonary effort is normal.      Breath sounds: Normal breath sounds.   Musculoskeletal:         General: Normal range of motion.      Cervical back: Normal range of motion.   Skin:     General: Skin is warm and dry.   Neurological:      General: No focal deficit present.      Mental Status: She is alert and oriented to person, place, and time.   Psychiatric:         Mood and Affect: Mood normal.         Behavior: Behavior normal.          PAT AIRWAY:   Airway:     Mallampati::  II    TM distance::  >3 FB    Neck ROM::  Limited  normal        Visit Vitals  /71   Pulse 86   Temp 36.7 °C (98 °F) (Oral)       DASI Risk Score      Flowsheet Row Most Recent Value   DASI SCORE 25.95   METS Score (Will be calculated only when all the questions are answered) 5.9          Caprini DVT Assessment      Flowsheet Row Most Recent Value   DVT Score 9   Current Status Major surgery planned, lasting 2-3 hours   History Prior major surgery   Age 60-75 years   BMI 31-40 (Obesity)          Modified Frailty Index      Flowsheet Row Most Recent Value   Modified Frailty Index Calculator .1818          CHADS2 Stroke Risk  Current as of 3 hours ago        N/A 3 - 100%: High Risk   2 - 3%: Medium Risk   0 - 2%: Low Risk     Last Change: N/A          This  score determines the patient's risk of having a stroke if the patient has atrial fibrillation.        This score is not applicable to this patient. Components are not calculated.          Revised Cardiac Risk Index      Flowsheet Row Most Recent Value   Revised Cardiac Risk Calculator 1          Apfel Simplified Score      Flowsheet Row Most Recent Value   Apfel Simplified Score Calculator 3          Risk Analysis Index Results This Encounter    No data found in the last 1 encounters.       Stop Bang Score      Flowsheet Row Most Recent Value   Do you snore loudly? 0   Do you often feel tired or fatigued after your sleep? 0   Has anyone ever observed you stop breathing in your sleep? 0   Do you have or are you being treated for high blood pressure? 1   Recent BMI (Calculated) 40.3   Is BMI greater than 35 kg/m2? 1=Yes   Age older than 50 years old? 1=Yes   Gender - Male 0=No          Results for orders placed or performed in visit on 11/06/23 (from the past 96 hour(s))   Basic Metabolic Panel   Result Value Ref Range    Glucose 131 (H) 74 - 99 mg/dL    Sodium 141 136 - 145 mmol/L    Potassium 4.1 3.5 - 5.3 mmol/L    Chloride 102 98 - 107 mmol/L    Bicarbonate 24 21 - 32 mmol/L    Anion Gap 19 10 - 20 mmol/L    Urea Nitrogen 23 6 - 23 mg/dL    Creatinine 0.93 0.50 - 1.05 mg/dL    eGFR 65 >60 mL/min/1.73m*2    Calcium 9.7 8.6 - 10.6 mg/dL   CBC and Auto Differential   Result Value Ref Range    WBC 8.2 4.4 - 11.3 x10*3/uL    nRBC 0.0 0.0 - 0.0 /100 WBCs    RBC 4.56 4.00 - 5.20 x10*6/uL    Hemoglobin 13.8 12.0 - 16.0 g/dL    Hematocrit 42.7 36.0 - 46.0 %    MCV 94 80 - 100 fL    MCH 30.3 26.0 - 34.0 pg    MCHC 32.3 32.0 - 36.0 g/dL    RDW 13.5 11.5 - 14.5 %    Platelets 282 150 - 450 x10*3/uL    Neutrophils % 49.0 40.0 - 80.0 %    Immature Granulocytes %, Automated 0.2 0.0 - 0.9 %    Lymphocytes % 34.3 13.0 - 44.0 %    Monocytes % 12.3 2.0 - 10.0 %    Eosinophils % 3.1 0.0 - 6.0 %    Basophils % 1.1 0.0 - 2.0 %     Neutrophils Absolute 4.00 1.60 - 5.50 x10*3/uL    Immature Granulocytes Absolute, Automated 0.02 0.00 - 0.50 x10*3/uL    Lymphocytes Absolute 2.80 0.80 - 3.00 x10*3/uL    Monocytes Absolute 1.00 (H) 0.05 - 0.80 x10*3/uL    Eosinophils Absolute 0.25 0.00 - 0.40 x10*3/uL    Basophils Absolute 0.09 0.00 - 0.10 x10*3/uL   Coagulation Screen   Result Value Ref Range    Protime 11.9 9.8 - 12.8 seconds    INR 1.1 0.9 - 1.1    aPTT 30 27 - 38 seconds   Type And Screen   Result Value Ref Range    ABO TYPE A     Rh TYPE POS     ANTIBODY SCREEN NEG         Diagnostic Results      EKG 11/2/23    Interpretation Summary    Sinus rhythm with occasional Premature ventricular complexes  Left axis deviation  Left anterior fascicular block  Nonspecific ST abnormality  Abnormal ECG  When compared with ECG of 12-MAY-2022 09:10,  Premature ventricular complexes are now Present  Confirmed by Anthony Maravilla ([id number]) on 11/2/2023 8:26:02 PM      Show images for ECG 12 lead      TRANSTHORACIC ECHOCARDIOGRAM REPORT   Study Date:    1/13/2023   PHYSICIAN INTERPRETATION:  Left Ventricle: The left ventricular systolic function is normal, with an estimated ejection fraction of 55-60%. The calculated ejection fraction is normal at 61 % using the Roy's Bi-plane MOD calculation. There are no regional wall motion abnormalities. The left ventricular cavity size is normal. There is borderline concentric left ventricular hypertrophy. Spectral Doppler shows an impaired relaxation pattern of left ventricular diastolic filling.  Left Atrium: The left atrium is upper limits of normal in size.  Right Ventricle: The right ventricle is normal in size. There is normal right ventriclar wall thickness. There is normal right ventricular global systolic function. A device is visualized in the right ventricle.  Right Atrium: The right atrium is normal in size. There is a device visualized in the right atrium.  Aortic Valve: The aortic valve appears structurally  normal. The aortic valve appears tricuspid and non-restricted. There is no evidence of aortic valve regurgitation. The peak instantaneous gradient of the aortic valve is 16.8 mmHg. The mean gradient of the aortic valve is 9.0 mmHg.  Mitral Valve: The mitral valve is normal in structure. There is normal mitral valve leaflet mobility. There is mild mitral valve regurgitation.  Tricuspid Valve: The tricuspid valve is structurally normal. There is normal tricuspid valve leaflet mobility. There is trace tricuspid regurgitation.  Pulmonic Valve: The pulmonic valve is structurally normal. There is trace pulmonic valve regurgitation.  Pericardium: There is no pericardial effusion noted.  Aorta: The aortic root is normal. The Ao Sinus is 3.30 cm. There is no dilatation of the aortic arch. There is no dilatation of the ascending aorta. There is no dilatation of the aortic root.  Pulmonary Artery: The estimated PASP is 25 mmHg.  In comparison to the previous echocardiogram(s): Compared with study from 3/28/2022,. The estimated LV ejection fraction has improved from 25-30% to 55-60% since the study of 3/28/2022.        CONCLUSIONS:  1. Left ventricular systolic function is normal with a 55-60% estimated ejection fraction.  2. Spectral Doppler shows an impaired relaxation pattern of left ventricular diastolic filling.  3. Mild mitral valve regurgitation.  4. The estimated LV ejection fraction has improved from 25-30% to 55-60% since the study of 3/28/2022.    Cardiovascular Catheterization Report  8/30/2021  CONCLUSIONS:   1. 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    Carotid Artery Duplex Ultrasound 7/26/2021  CONCLUSIONS:  Right Carotid: Findings are consistent with less than 50% stenosis of the right proximal ICA. Laminar flow seen by color Doppler. Velocities may be underestimated at the right proximal ICA due to calcified shadowing plaque. Right external carotid artery appears patent with no evidence of  stenosis. The right vertebral artery demonstrates a pre-steal waveform. There are elevated velocities in the right subclavian that are suggestive of disease.  Left Carotid: Findings are consistent with less than 50% stenosis of the left proximal ICA. Velocities may be underestimated at the left proximal ICA due to calcified shadowing plaque. Left external carotid artery appears patent with no evidence of stenosis. The left vertebral artery is patent with antegrade flow. No evidence of hemodynamically significant stenosis in the left subclavian.  Additional Findings:     Imaging & Doppler Findings:  Right Plaque Morph: The proximal right internal carotid artery demonstrates heterogenous and calcified plaque. The proximal right external carotid artery demonstrates heterogenous and calcified plaque. The distal right common carotid artery demonstrates heterogenous plaque.  Left Plaque Morph: The proximal left internal carotid artery demonstrates heterogenous and calcified plaque. The proximal left external carotid artery demonstrates calcified and heterogenous plaque.      Assessment and Plan:     Neuro:   The patient has no neurological diagnoses, however, the patient is at increased risk for postoperative delirium secondary to Age 65 or older, Polypharmacy. The patient is at increased risk for perioperative stroke secondary to hypertension , increased age, hyperlipidemia, female gender, diabetes mellitus, general anesthesia, operative time >2.5 hours.    HEENT/Airway  The patient has diagnoses, significant findings on chart review, clinical presentation or evaluation of obesity, short thick neck, prior neck surgery, fusion/fixation  History of postoperative hoarseness since spinal surgery 1/2023. Seen 4/17/23 by Dr. Resendiz otolaryngology for vocal cord weakness and hoarseness. F/U as needed.    Cardiovascular  The patient is scheduled for non-cardiac surgery associated with elevated risk.  The patient has no major  cardiac contraindications to non- cardiac surgery.  RCRI  The patient meets 0-1 RCRI criteria and therefore has a less than 1% risk of major adverse cardiac complications.  METS  The patient's functional capacity capacity is greater than 4 METS.  EKG  The patient has no EKG or echocardiographic changes concerning for myocardial ischemia.  No further cardiac evaluation is indicated  Heart Failure  The patient has history of cardiomyopathy EF WNL on last echo.  Hypertension Evaluation  The patient has a known history of hypertension that is controlled.  Patient's hypertension is most consistent with stage 1.  Heart Rhythm Evaluation  The patient has no history of arrhythmias.  History of single lead ICD implanted 5/11/2022 prophylactically which has not discharged. Follows with device clinic  Heart Valve Evaluation  The patient has a known history of valvular heart disease.  Per patient's prior studies, the patient has mild MR.  CARDS EVAL  The patient follows with cardiology, Dr. Anthony Maravilla for history of low grade nonobstructive coronary artery disease, HTN, and NICM (echo on 1/13/23 shows estimated LV ejection fraction has improved from 25-30% to 55-60% since the study of 3/28/2022.  he patient has a 30-day risk for MACE of 1 predictor, 6.0% risk for cardiac death, nonfatal myocardial infarction, and nonfatal cardiac arrest.  Pulmonary   The patient has findings on chart review, clinical presentation and evaluation significant for WILLIAM. The patient is at increased risk of perioperative pulmonary complications secondary to WILLIAM, advanced age greater than 60, morbid obesity. Patient educated to bring CPAP/BIPAP day of surgery  ARISCAT 19, low, 1.6% risk of in-hospital postoperative pulmonary complications  PRODIGY 20, high of respiratory depression episode.    Hematology  No diagnoses or significant findings on chart review or clinical presentation and evaluation.  Antiplatelet management   The patient is not  currently receiving antiplatelet therapy.  Anticoagulation management  The patient is not currently receiving anticoagulation therapy.  Caprini score 3, high risk of VTE  Transfusion Evaluation  A type and screen was obtained given the likelihood for perioperative transfusion of blood or blood products.    GI  No diagnoses or significant findings on chart review or clinical presentation and evaluation.  Eat 10- 0    Genitourinary  No diagnoses or significant findings on chart review or clinical presentation and evaluation.    Renal  The patient has no known history of chronic kidney disease. No renal diagnoses or significant findings on chart review or clinical presentation and evaluation. The patient has specific risk factors associated with increased risk of perioperative renal complications due to age greater than 55, hypertension, diabetes mellitus.    Musculoskeletal  The patient has diagnoses or significant findings on chart review or clinical presentation and evaluation significant for cervical stenosis status post C6 corpectomy for cervical myelopathy.  She is also status post multiple (4) lumbosacral spine surgeries from T9-L5. Plan for revision surgery 11/14/23 with Dr. Hawkins. Left rotator cuff injury.    Endocrine  Diabetes Evaluation  The patient has history of diabetes mellitus controlled by oral medications. HGBA1C 9 on 10/10/23. Dr. Hawkins aware. Will proceed with surgery due to compressive symptoms.  Thyroid Disease Evaluation  The patient has a history of thyroid disease that appears controlled.    ID  No diagnoses or significant findings on chart review or clinical presentation and evaluation.    -Preoperative medication instructions were provided and reviewed with the patient.  Any additional testing or evaluation was explained to the patient.  NPO Instructions were discussed, and the patient's questions were answered prior to conclusion of this encounter

## 2023-11-07 RX ORDER — CHLORHEXIDINE GLUCONATE ORAL RINSE 1.2 MG/ML
15 SOLUTION DENTAL AS NEEDED
Qty: 120 ML | Refills: 0 | Status: SHIPPED | OUTPATIENT
Start: 2023-11-09 | End: 2023-11-15 | Stop reason: HOSPADM

## 2023-11-07 RX ORDER — CHLORHEXIDINE GLUCONATE 40 MG/ML
SOLUTION TOPICAL DAILY PRN
Qty: 473 ML | Refills: 0 | Status: SHIPPED | OUTPATIENT
Start: 2023-11-09 | End: 2023-11-15 | Stop reason: HOSPADM

## 2023-11-08 LAB — STAPHYLOCOCCUS SPEC CULT: ABNORMAL

## 2023-11-14 ENCOUNTER — ANESTHESIA EVENT (OUTPATIENT)
Dept: OPERATING ROOM | Facility: HOSPITAL | Age: 73
DRG: 472 | End: 2023-11-14
Payer: MEDICARE

## 2023-11-14 ENCOUNTER — HOSPITAL ENCOUNTER (OUTPATIENT)
Dept: RADIOLOGY | Facility: HOSPITAL | Age: 73
Setting detail: OBSERVATION
Discharge: HOME | DRG: 472 | End: 2023-11-14
Payer: MEDICARE

## 2023-11-14 ENCOUNTER — ANESTHESIA (OUTPATIENT)
Dept: OPERATING ROOM | Facility: HOSPITAL | Age: 73
DRG: 472 | End: 2023-11-14
Payer: MEDICARE

## 2023-11-14 ENCOUNTER — HOSPITAL ENCOUNTER (OUTPATIENT)
Dept: CARDIOLOGY | Facility: HOSPITAL | Age: 73
Setting detail: OBSERVATION
Discharge: HOME | DRG: 472 | End: 2023-11-14
Payer: MEDICARE

## 2023-11-14 ENCOUNTER — HOSPITAL ENCOUNTER (OUTPATIENT)
Dept: CARDIOLOGY | Facility: HOSPITAL | Age: 73
Setting detail: SURGERY ADMIT
Discharge: HOME | DRG: 472 | End: 2023-11-14
Payer: MEDICARE

## 2023-11-14 ENCOUNTER — HOSPITAL ENCOUNTER (INPATIENT)
Facility: HOSPITAL | Age: 73
LOS: 1 days | Discharge: HOME | DRG: 472 | End: 2023-11-15
Attending: ORTHOPAEDIC SURGERY | Admitting: ORTHOPAEDIC SURGERY
Payer: MEDICARE

## 2023-11-14 DIAGNOSIS — J40 BRONCHITIS: ICD-10-CM

## 2023-11-14 DIAGNOSIS — M48.02 CERVICAL STENOSIS OF SPINAL CANAL: ICD-10-CM

## 2023-11-14 DIAGNOSIS — B33.24 VIRAL CARDIOMYOPATHY (MULTI): ICD-10-CM

## 2023-11-14 DIAGNOSIS — M48.02 CERVICAL SPINAL STENOSIS: Primary | ICD-10-CM

## 2023-11-14 DIAGNOSIS — I49.02 VENTRICULAR FLUTTER (MULTI): ICD-10-CM

## 2023-11-14 DIAGNOSIS — M47.12 CERVICAL SPONDYLOSIS WITH MYELOPATHY: ICD-10-CM

## 2023-11-14 DIAGNOSIS — Z95.810 ICD (IMPLANTABLE CARDIOVERTER-DEFIBRILLATOR) IN PLACE: ICD-10-CM

## 2023-11-14 DIAGNOSIS — M96.0 PSEUDARTHROSIS AFTER FUSION OR ARTHRODESIS: ICD-10-CM

## 2023-11-14 DIAGNOSIS — I49.01 VENTRICULAR FIBRILLATION AND FLUTTER (MULTI): ICD-10-CM

## 2023-11-14 DIAGNOSIS — I49.02 VENTRICULAR FIBRILLATION AND FLUTTER (MULTI): ICD-10-CM

## 2023-11-14 LAB
GLUCOSE BLD MANUAL STRIP-MCNC: 178 MG/DL (ref 74–99)
GLUCOSE BLD MANUAL STRIP-MCNC: 226 MG/DL (ref 74–99)
GLUCOSE BLD MANUAL STRIP-MCNC: 288 MG/DL (ref 74–99)

## 2023-11-14 PROCEDURE — 82947 ASSAY GLUCOSE BLOOD QUANT: CPT

## 2023-11-14 PROCEDURE — 22854 INSJ BIOMECHANICAL DEVICE: CPT | Performed by: ORTHOPAEDIC SURGERY

## 2023-11-14 PROCEDURE — 2720000007 HC OR 272 NO HCPCS: Performed by: ORTHOPAEDIC SURGERY

## 2023-11-14 PROCEDURE — 63081 REMOVE VERT BODY DCMPRN CRVL: CPT | Performed by: ORTHOPAEDIC SURGERY

## 2023-11-14 PROCEDURE — C1713 ANCHOR/SCREW BN/BN,TIS/BN: HCPCS | Performed by: ORTHOPAEDIC SURGERY

## 2023-11-14 PROCEDURE — 63082 REMOVE VERTEBRAL BODY ADD-ON: CPT | Performed by: ORTHOPAEDIC SURGERY

## 2023-11-14 PROCEDURE — 2500000004 HC RX 250 GENERAL PHARMACY W/ HCPCS (ALT 636 FOR OP/ED)

## 2023-11-14 PROCEDURE — 2780000003 HC OR 278 NO HCPCS: Performed by: ORTHOPAEDIC SURGERY

## 2023-11-14 PROCEDURE — 0PP304Z REMOVAL OF INTERNAL FIXATION DEVICE FROM CERVICAL VERTEBRA, OPEN APPROACH: ICD-10-PCS | Performed by: ORTHOPAEDIC SURGERY

## 2023-11-14 PROCEDURE — 2500000001 HC RX 250 WO HCPCS SELF ADMINISTERED DRUGS (ALT 637 FOR MEDICARE OP): Performed by: ANESTHESIOLOGY

## 2023-11-14 PROCEDURE — 00NW0ZZ RELEASE CERVICAL SPINAL CORD, OPEN APPROACH: ICD-10-PCS | Performed by: ORTHOPAEDIC SURGERY

## 2023-11-14 PROCEDURE — 3600000017 HC OR TIME - EACH INCREMENTAL 1 MINUTE - PROCEDURE LEVEL SIX: Performed by: ORTHOPAEDIC SURGERY

## 2023-11-14 PROCEDURE — 93287 PERI-PX DEVICE EVAL & PRGR: CPT | Performed by: INTERNAL MEDICINE

## 2023-11-14 PROCEDURE — 76000 FLUOROSCOPY <1 HR PHYS/QHP: CPT

## 2023-11-14 PROCEDURE — 2500000005 HC RX 250 GENERAL PHARMACY W/O HCPCS

## 2023-11-14 PROCEDURE — 3700000001 HC GENERAL ANESTHESIA TIME - INITIAL BASE CHARGE: Performed by: ORTHOPAEDIC SURGERY

## 2023-11-14 PROCEDURE — 2500000005 HC RX 250 GENERAL PHARMACY W/O HCPCS: Performed by: ORTHOPAEDIC SURGERY

## 2023-11-14 PROCEDURE — A22554 PR ARTHRODESIS ANT INTERBODY MIN DISCECTOMY, CERVICAL BELOW C2: Performed by: STUDENT IN AN ORGANIZED HEALTH CARE EDUCATION/TRAINING PROGRAM

## 2023-11-14 PROCEDURE — 93289 INTERROG DEVICE EVAL HEART: CPT

## 2023-11-14 PROCEDURE — 2500000002 HC RX 250 W HCPCS SELF ADMINISTERED DRUGS (ALT 637 FOR MEDICARE OP, ALT 636 FOR OP/ED): Performed by: STUDENT IN AN ORGANIZED HEALTH CARE EDUCATION/TRAINING PROGRAM

## 2023-11-14 PROCEDURE — 2500000001 HC RX 250 WO HCPCS SELF ADMINISTERED DRUGS (ALT 637 FOR MEDICARE OP)

## 2023-11-14 PROCEDURE — 22585 ARTHRD ANT NTRBD MIN DSC EA: CPT | Performed by: ORTHOPAEDIC SURGERY

## 2023-11-14 PROCEDURE — 99100 ANES PT EXTEME AGE<1 YR&>70: CPT | Performed by: STUDENT IN AN ORGANIZED HEALTH CARE EDUCATION/TRAINING PROGRAM

## 2023-11-14 PROCEDURE — 72020 X-RAY EXAM OF SPINE 1 VIEW: CPT

## 2023-11-14 PROCEDURE — 3700000002 HC GENERAL ANESTHESIA TIME - EACH INCREMENTAL 1 MINUTE: Performed by: ORTHOPAEDIC SURGERY

## 2023-11-14 PROCEDURE — XW0V0P7 INTRODUCTION OF ANTIBIOTIC-ELUTING BONE VOID FILLER INTO BONES, OPEN APPROACH, NEW TECHNOLOGY GROUP 7: ICD-10-PCS | Performed by: ORTHOPAEDIC SURGERY

## 2023-11-14 PROCEDURE — 0RG20A0 FUSION OF 2 OR MORE CERVICAL VERTEBRAL JOINTS WITH INTERBODY FUSION DEVICE, ANTERIOR APPROACH, ANTERIOR COLUMN, OPEN APPROACH: ICD-10-PCS | Performed by: ORTHOPAEDIC SURGERY

## 2023-11-14 PROCEDURE — 7100000002 HC RECOVERY ROOM TIME - EACH INCREMENTAL 1 MINUTE: Performed by: ORTHOPAEDIC SURGERY

## 2023-11-14 PROCEDURE — 22554 ARTHRD ANT NTRBD MIN DSC CRV: CPT | Performed by: ORTHOPAEDIC SURGERY

## 2023-11-14 PROCEDURE — 22855 REMOVAL ANTERIOR INSTRMJ: CPT | Performed by: ORTHOPAEDIC SURGERY

## 2023-11-14 PROCEDURE — A22554 PR ARTHRODESIS ANT INTERBODY MIN DISCECTOMY, CERVICAL BELOW C2

## 2023-11-14 PROCEDURE — 0RG40A0 FUSION OF CERVICOTHORACIC VERTEBRAL JOINT WITH INTERBODY FUSION DEVICE, ANTERIOR APPROACH, ANTERIOR COLUMN, OPEN APPROACH: ICD-10-PCS | Performed by: ORTHOPAEDIC SURGERY

## 2023-11-14 PROCEDURE — C1776 JOINT DEVICE (IMPLANTABLE): HCPCS | Performed by: ORTHOPAEDIC SURGERY

## 2023-11-14 PROCEDURE — 7100000001 HC RECOVERY ROOM TIME - INITIAL BASE CHARGE: Performed by: ORTHOPAEDIC SURGERY

## 2023-11-14 PROCEDURE — 20930 SP BONE ALGRFT MORSEL ADD-ON: CPT | Performed by: ORTHOPAEDIC SURGERY

## 2023-11-14 PROCEDURE — 2500000004 HC RX 250 GENERAL PHARMACY W/ HCPCS (ALT 636 FOR OP/ED): Performed by: ANESTHESIOLOGY

## 2023-11-14 PROCEDURE — 1100000001 HC PRIVATE ROOM DAILY

## 2023-11-14 PROCEDURE — A4649 SURGICAL SUPPLIES: HCPCS | Performed by: ORTHOPAEDIC SURGERY

## 2023-11-14 PROCEDURE — 3600000018 HC OR TIME - INITIAL BASE CHARGE - PROCEDURE LEVEL SIX: Performed by: ORTHOPAEDIC SURGERY

## 2023-11-14 DEVICE — IMPLANTABLE DEVICE: Type: IMPLANTABLE DEVICE | Site: SPINE CERVICAL | Status: FUNCTIONAL

## 2023-11-14 DEVICE — PIN, DISTRACTION, CERVICAL, 14 MM, STAINLESS STEEL, DISPOSABLE, STERILE: Type: IMPLANTABLE DEVICE | Site: SPINE CERVICAL | Status: FUNCTIONAL

## 2023-11-14 DEVICE — SCREW, MAXCESS-C, 14MM DISTRACTION: Type: IMPLANTABLE DEVICE | Site: SPINE CERVICAL | Status: NON-FUNCTIONAL

## 2023-11-14 DEVICE — IMPLANTABLE DEVICE
Type: IMPLANTABLE DEVICE | Site: SPINE CERVICAL | Status: FUNCTIONAL
Brand: CERAMENT™BONE VOID FILLER

## 2023-11-14 DEVICE — BONE MATRIX, OSTEOCEL, PRO CELLULAR, SMALL: Type: IMPLANTABLE DEVICE | Site: SPINE CERVICAL | Status: FUNCTIONAL

## 2023-11-14 DEVICE — SCREW, ACP, SELF DRILL, 3.5 X 15MM, VARIABLE: Type: IMPLANTABLE DEVICE | Site: SPINE CERVICAL | Status: FUNCTIONAL

## 2023-11-14 RX ORDER — CEFAZOLIN SODIUM 2 G/50ML
SOLUTION INTRAVENOUS AS NEEDED
Status: DISCONTINUED | OUTPATIENT
Start: 2023-11-14 | End: 2023-11-14

## 2023-11-14 RX ORDER — DEXTROSE 50 % IN WATER (D50W) INTRAVENOUS SYRINGE
25
Status: DISCONTINUED | OUTPATIENT
Start: 2023-11-14 | End: 2023-11-15 | Stop reason: HOSPADM

## 2023-11-14 RX ORDER — PHENYLEPHRINE HCL IN 0.9% NACL 0.4MG/10ML
SYRINGE (ML) INTRAVENOUS AS NEEDED
Status: DISCONTINUED | OUTPATIENT
Start: 2023-11-14 | End: 2023-11-14

## 2023-11-14 RX ORDER — METOPROLOL SUCCINATE 100 MG/1
100 TABLET, EXTENDED RELEASE ORAL DAILY
Status: DISCONTINUED | OUTPATIENT
Start: 2023-11-14 | End: 2023-11-15 | Stop reason: HOSPADM

## 2023-11-14 RX ORDER — FERROUS SULFATE 325(65) MG
65 TABLET ORAL 2 TIMES DAILY
Status: DISCONTINUED | OUTPATIENT
Start: 2023-11-14 | End: 2023-11-15

## 2023-11-14 RX ORDER — HYDROMORPHONE HYDROCHLORIDE 1 MG/ML
INJECTION, SOLUTION INTRAMUSCULAR; INTRAVENOUS; SUBCUTANEOUS AS NEEDED
Status: DISCONTINUED | OUTPATIENT
Start: 2023-11-14 | End: 2023-11-14

## 2023-11-14 RX ORDER — LIDOCAINE HYDROCHLORIDE 20 MG/ML
INJECTION, SOLUTION INFILTRATION; PERINEURAL AS NEEDED
Status: DISCONTINUED | OUTPATIENT
Start: 2023-11-14 | End: 2023-11-14

## 2023-11-14 RX ORDER — MEPERIDINE HYDROCHLORIDE 25 MG/ML
12.5 INJECTION INTRAMUSCULAR; INTRAVENOUS; SUBCUTANEOUS EVERY 10 MIN PRN
Status: DISCONTINUED | OUTPATIENT
Start: 2023-11-14 | End: 2023-11-14 | Stop reason: HOSPADM

## 2023-11-14 RX ORDER — LORATADINE 10 MG/1
10 TABLET ORAL DAILY
Status: DISCONTINUED | OUTPATIENT
Start: 2023-11-15 | End: 2023-11-15 | Stop reason: HOSPADM

## 2023-11-14 RX ORDER — ALBUTEROL SULFATE 0.83 MG/ML
2.5 SOLUTION RESPIRATORY (INHALATION) ONCE AS NEEDED
Status: DISCONTINUED | OUTPATIENT
Start: 2023-11-14 | End: 2023-11-14 | Stop reason: HOSPADM

## 2023-11-14 RX ORDER — MIDAZOLAM HYDROCHLORIDE 1 MG/ML
INJECTION INTRAMUSCULAR; INTRAVENOUS AS NEEDED
Status: DISCONTINUED | OUTPATIENT
Start: 2023-11-14 | End: 2023-11-14

## 2023-11-14 RX ORDER — SODIUM CHLORIDE, SODIUM LACTATE, POTASSIUM CHLORIDE, CALCIUM CHLORIDE 600; 310; 30; 20 MG/100ML; MG/100ML; MG/100ML; MG/100ML
INJECTION, SOLUTION INTRAVENOUS CONTINUOUS PRN
Status: DISCONTINUED | OUTPATIENT
Start: 2023-11-14 | End: 2023-11-14

## 2023-11-14 RX ORDER — ACETAMINOPHEN 325 MG/1
TABLET ORAL AS NEEDED
Status: DISCONTINUED | OUTPATIENT
Start: 2023-11-14 | End: 2023-11-14

## 2023-11-14 RX ORDER — ACETAMINOPHEN 325 MG/1
650 TABLET ORAL EVERY 4 HOURS PRN
Status: DISCONTINUED | OUTPATIENT
Start: 2023-11-14 | End: 2023-11-14 | Stop reason: HOSPADM

## 2023-11-14 RX ORDER — PNV NO.95/FERROUS FUM/FOLIC AC 28MG-0.8MG
250 TABLET ORAL DAILY
Status: DISCONTINUED | OUTPATIENT
Start: 2023-11-15 | End: 2023-11-15 | Stop reason: HOSPADM

## 2023-11-14 RX ORDER — ONDANSETRON HYDROCHLORIDE 2 MG/ML
4 INJECTION, SOLUTION INTRAVENOUS ONCE AS NEEDED
Status: DISCONTINUED | OUTPATIENT
Start: 2023-11-14 | End: 2023-11-14 | Stop reason: HOSPADM

## 2023-11-14 RX ORDER — DEXAMETHASONE SODIUM PHOSPHATE 4 MG/ML
INJECTION, SOLUTION INTRA-ARTICULAR; INTRALESIONAL; INTRAMUSCULAR; INTRAVENOUS; SOFT TISSUE AS NEEDED
Status: DISCONTINUED | OUTPATIENT
Start: 2023-11-14 | End: 2023-11-14

## 2023-11-14 RX ORDER — GABAPENTIN 300 MG/1
300 CAPSULE ORAL 2 TIMES DAILY
Status: DISCONTINUED | OUTPATIENT
Start: 2023-11-14 | End: 2023-11-15 | Stop reason: HOSPADM

## 2023-11-14 RX ORDER — ESCITALOPRAM OXALATE 10 MG/1
20 TABLET ORAL DAILY
Status: DISCONTINUED | OUTPATIENT
Start: 2023-11-15 | End: 2023-11-15 | Stop reason: HOSPADM

## 2023-11-14 RX ORDER — GABAPENTIN 300 MG/1
CAPSULE ORAL AS NEEDED
Status: DISCONTINUED | OUTPATIENT
Start: 2023-11-14 | End: 2023-11-14

## 2023-11-14 RX ORDER — FENTANYL CITRATE 50 UG/ML
INJECTION, SOLUTION INTRAMUSCULAR; INTRAVENOUS AS NEEDED
Status: DISCONTINUED | OUTPATIENT
Start: 2023-11-14 | End: 2023-11-14

## 2023-11-14 RX ORDER — ALBUTEROL SULFATE 90 UG/1
1 AEROSOL, METERED RESPIRATORY (INHALATION) EVERY 4 HOURS PRN
Status: DISCONTINUED | OUTPATIENT
Start: 2023-11-14 | End: 2023-11-15 | Stop reason: HOSPADM

## 2023-11-14 RX ORDER — ONDANSETRON HYDROCHLORIDE 2 MG/ML
INJECTION, SOLUTION INTRAVENOUS AS NEEDED
Status: DISCONTINUED | OUTPATIENT
Start: 2023-11-14 | End: 2023-11-14

## 2023-11-14 RX ORDER — ATORVASTATIN CALCIUM 40 MG/1
40 TABLET, FILM COATED ORAL
Status: DISCONTINUED | OUTPATIENT
Start: 2023-11-14 | End: 2023-11-15 | Stop reason: HOSPADM

## 2023-11-14 RX ORDER — BIOTIN 1 MG
10 TABLET ORAL DAILY
Status: DISCONTINUED | OUTPATIENT
Start: 2023-11-15 | End: 2023-11-15 | Stop reason: HOSPADM

## 2023-11-14 RX ORDER — FERROUS SULFATE, DRIED 160(50) MG
1 TABLET, EXTENDED RELEASE ORAL DAILY
Status: DISCONTINUED | OUTPATIENT
Start: 2023-11-15 | End: 2023-11-15 | Stop reason: HOSPADM

## 2023-11-14 RX ORDER — LANOLIN ALCOHOL/MO/W.PET/CERES
800 CREAM (GRAM) TOPICAL DAILY
Status: DISCONTINUED | OUTPATIENT
Start: 2023-11-15 | End: 2023-11-15 | Stop reason: HOSPADM

## 2023-11-14 RX ORDER — LABETALOL HYDROCHLORIDE 5 MG/ML
5 INJECTION, SOLUTION INTRAVENOUS ONCE AS NEEDED
Status: DISCONTINUED | OUTPATIENT
Start: 2023-11-14 | End: 2023-11-14 | Stop reason: HOSPADM

## 2023-11-14 RX ORDER — LEVOTHYROXINE SODIUM 100 UG/1
100 TABLET ORAL
Status: DISCONTINUED | OUTPATIENT
Start: 2023-11-15 | End: 2023-11-15 | Stop reason: HOSPADM

## 2023-11-14 RX ORDER — FOLIC ACID 1 MG/1
1 TABLET ORAL DAILY
Status: DISCONTINUED | OUTPATIENT
Start: 2023-11-15 | End: 2023-11-15 | Stop reason: HOSPADM

## 2023-11-14 RX ORDER — DIPHENHYDRAMINE HYDROCHLORIDE 50 MG/ML
12.5 INJECTION INTRAMUSCULAR; INTRAVENOUS ONCE AS NEEDED
Status: DISCONTINUED | OUTPATIENT
Start: 2023-11-14 | End: 2023-11-14 | Stop reason: HOSPADM

## 2023-11-14 RX ORDER — INSULIN LISPRO 100 [IU]/ML
0-10 INJECTION, SOLUTION INTRAVENOUS; SUBCUTANEOUS
Status: DISCONTINUED | OUTPATIENT
Start: 2023-11-15 | End: 2023-11-15 | Stop reason: HOSPADM

## 2023-11-14 RX ORDER — HYDROXYCHLOROQUINE SULFATE 200 MG/1
200 TABLET, FILM COATED ORAL 2 TIMES DAILY
Status: DISCONTINUED | OUTPATIENT
Start: 2023-11-14 | End: 2023-11-15 | Stop reason: HOSPADM

## 2023-11-14 RX ORDER — ACETAMINOPHEN 500 MG
1000 TABLET ORAL 2 TIMES DAILY
COMMUNITY
End: 2023-11-15 | Stop reason: HOSPADM

## 2023-11-14 RX ORDER — POLYMYXIN B 500000 [USP'U]/1
INJECTION, POWDER, LYOPHILIZED, FOR SOLUTION INTRAMUSCULAR; INTRATHECAL; INTRAVENOUS; OPHTHALMIC AS NEEDED
Status: DISCONTINUED | OUTPATIENT
Start: 2023-11-14 | End: 2023-11-14 | Stop reason: HOSPADM

## 2023-11-14 RX ORDER — ASPIRIN 81 MG/1
81 TABLET ORAL DAILY
Status: DISCONTINUED | OUTPATIENT
Start: 2023-11-15 | End: 2023-11-15 | Stop reason: HOSPADM

## 2023-11-14 RX ORDER — CHOLECALCIFEROL (VITAMIN D3) 125 MCG
5000 CAPSULE ORAL DAILY
Status: DISCONTINUED | OUTPATIENT
Start: 2023-11-15 | End: 2023-11-15 | Stop reason: HOSPADM

## 2023-11-14 RX ORDER — PROPOFOL 10 MG/ML
INJECTION, EMULSION INTRAVENOUS AS NEEDED
Status: DISCONTINUED | OUTPATIENT
Start: 2023-11-14 | End: 2023-11-14

## 2023-11-14 RX ORDER — LIDOCAINE HYDROCHLORIDE 10 MG/ML
0.1 INJECTION, SOLUTION EPIDURAL; INFILTRATION; INTRACAUDAL; PERINEURAL ONCE
Status: DISCONTINUED | OUTPATIENT
Start: 2023-11-14 | End: 2023-11-14 | Stop reason: HOSPADM

## 2023-11-14 RX ORDER — OXYCODONE HYDROCHLORIDE 5 MG/1
5 TABLET ORAL EVERY 4 HOURS PRN
Status: DISCONTINUED | OUTPATIENT
Start: 2023-11-14 | End: 2023-11-14 | Stop reason: HOSPADM

## 2023-11-14 RX ORDER — SODIUM CHLORIDE, SODIUM LACTATE, POTASSIUM CHLORIDE, CALCIUM CHLORIDE 600; 310; 30; 20 MG/100ML; MG/100ML; MG/100ML; MG/100ML
100 INJECTION, SOLUTION INTRAVENOUS CONTINUOUS
Status: DISCONTINUED | OUTPATIENT
Start: 2023-11-14 | End: 2023-11-14 | Stop reason: HOSPADM

## 2023-11-14 RX ORDER — DEXTROSE MONOHYDRATE 100 MG/ML
0.3 INJECTION, SOLUTION INTRAVENOUS ONCE AS NEEDED
Status: DISCONTINUED | OUTPATIENT
Start: 2023-11-14 | End: 2023-11-15 | Stop reason: HOSPADM

## 2023-11-14 RX ORDER — PANTOPRAZOLE SODIUM 40 MG/1
40 TABLET, DELAYED RELEASE ORAL
Status: DISCONTINUED | OUTPATIENT
Start: 2023-11-15 | End: 2023-11-15 | Stop reason: HOSPADM

## 2023-11-14 RX ORDER — FLUTICASONE PROPIONATE 50 MCG
1 SPRAY, SUSPENSION (ML) NASAL DAILY
Status: DISCONTINUED | OUTPATIENT
Start: 2023-11-15 | End: 2023-11-15 | Stop reason: HOSPADM

## 2023-11-14 RX ORDER — ROCURONIUM BROMIDE 10 MG/ML
INJECTION, SOLUTION INTRAVENOUS AS NEEDED
Status: DISCONTINUED | OUTPATIENT
Start: 2023-11-14 | End: 2023-11-14

## 2023-11-14 RX ADMIN — PROPOFOL 50 MG: 10 INJECTION, EMULSION INTRAVENOUS at 13:43

## 2023-11-14 RX ADMIN — HYDROMORPHONE HYDROCHLORIDE 0.5 MG: 2 INJECTION, SOLUTION INTRAMUSCULAR; INTRAVENOUS; SUBCUTANEOUS at 17:11

## 2023-11-14 RX ADMIN — ROCURONIUM BROMIDE 10 MG: 50 INJECTION, SOLUTION INTRAVENOUS at 14:39

## 2023-11-14 RX ADMIN — HYDROMORPHONE HYDROCHLORIDE 0.2 MG: 1 INJECTION, SOLUTION INTRAMUSCULAR; INTRAVENOUS; SUBCUTANEOUS at 16:11

## 2023-11-14 RX ADMIN — MIDAZOLAM HYDROCHLORIDE 2 MG: 1 INJECTION, SOLUTION INTRAMUSCULAR; INTRAVENOUS at 13:12

## 2023-11-14 RX ADMIN — FENTANYL CITRATE 50 MCG: 50 INJECTION, SOLUTION INTRAMUSCULAR; INTRAVENOUS at 13:20

## 2023-11-14 RX ADMIN — ROCURONIUM BROMIDE 60 MG: 50 INJECTION, SOLUTION INTRAVENOUS at 13:21

## 2023-11-14 RX ADMIN — ONDANSETRON 4 MG: 2 INJECTION INTRAMUSCULAR; INTRAVENOUS at 15:40

## 2023-11-14 RX ADMIN — SODIUM CHLORIDE, POTASSIUM CHLORIDE, SODIUM LACTATE AND CALCIUM CHLORIDE: 600; 310; 30; 20 INJECTION, SOLUTION INTRAVENOUS at 13:12

## 2023-11-14 RX ADMIN — OXYCODONE HYDROCHLORIDE 5 MG: 5 TABLET ORAL at 19:14

## 2023-11-14 RX ADMIN — ACETAMINOPHEN 975 MG: 325 TABLET ORAL at 13:00

## 2023-11-14 RX ADMIN — PROPOFOL 150 MG: 10 INJECTION, EMULSION INTRAVENOUS at 13:20

## 2023-11-14 RX ADMIN — LIDOCAINE HYDROCHLORIDE 100 MG: 20 INJECTION, SOLUTION INFILTRATION; PERINEURAL at 13:20

## 2023-11-14 RX ADMIN — SUGAMMADEX 200 MG: 100 INJECTION, SOLUTION INTRAVENOUS at 15:49

## 2023-11-14 RX ADMIN — CEFAZOLIN SODIUM 2 G: 2 SOLUTION INTRAVENOUS at 13:30

## 2023-11-14 RX ADMIN — HYDROMORPHONE HYDROCHLORIDE 0.5 MG: 2 INJECTION, SOLUTION INTRAMUSCULAR; INTRAVENOUS; SUBCUTANEOUS at 18:20

## 2023-11-14 RX ADMIN — ROCURONIUM BROMIDE 10 MG: 50 INJECTION, SOLUTION INTRAVENOUS at 14:05

## 2023-11-14 RX ADMIN — FENTANYL CITRATE 50 MCG: 50 INJECTION, SOLUTION INTRAMUSCULAR; INTRAVENOUS at 13:40

## 2023-11-14 RX ADMIN — HYDROMORPHONE HYDROCHLORIDE 0.4 MG: 1 INJECTION, SOLUTION INTRAMUSCULAR; INTRAVENOUS; SUBCUTANEOUS at 15:44

## 2023-11-14 RX ADMIN — DEXAMETHASONE SODIUM PHOSPHATE 10 MG: 4 INJECTION, SOLUTION INTRA-ARTICULAR; INTRALESIONAL; INTRAMUSCULAR; INTRAVENOUS; SOFT TISSUE at 13:30

## 2023-11-14 RX ADMIN — Medication 80 MCG: at 14:29

## 2023-11-14 RX ADMIN — Medication 80 MCG: at 14:03

## 2023-11-14 RX ADMIN — INSULIN HUMAN 4 UNITS: 100 INJECTION, SOLUTION PARENTERAL at 17:08

## 2023-11-14 RX ADMIN — GABAPENTIN 300 MG: 300 CAPSULE ORAL at 13:00

## 2023-11-14 RX ADMIN — DEXAMETHASONE SODIUM PHOSPHATE 10 MG: 4 INJECTION, SOLUTION INTRA-ARTICULAR; INTRALESIONAL; INTRAMUSCULAR; INTRAVENOUS; SOFT TISSUE at 15:30

## 2023-11-14 SDOH — ECONOMIC STABILITY: INCOME INSECURITY: IN THE LAST 12 MONTHS, WAS THERE A TIME WHEN YOU WERE NOT ABLE TO PAY THE MORTGAGE OR RENT ON TIME?: NO

## 2023-11-14 SDOH — SOCIAL STABILITY: SOCIAL INSECURITY: ABUSE: ADULT

## 2023-11-14 SDOH — ECONOMIC STABILITY: TRANSPORTATION INSECURITY
IN THE PAST 12 MONTHS, HAS THE LACK OF TRANSPORTATION KEPT YOU FROM MEDICAL APPOINTMENTS OR FROM GETTING MEDICATIONS?: NO

## 2023-11-14 SDOH — SOCIAL STABILITY: SOCIAL NETWORK
DO YOU BELONG TO ANY CLUBS OR ORGANIZATIONS SUCH AS CHURCH GROUPS UNIONS, FRATERNAL OR ATHLETIC GROUPS, OR SCHOOL GROUPS?: YES

## 2023-11-14 SDOH — SOCIAL STABILITY: SOCIAL NETWORK: HOW OFTEN DO YOU GET TOGETHER WITH FRIENDS OR RELATIVES?: NEVER

## 2023-11-14 SDOH — HEALTH STABILITY: MENTAL HEALTH: HOW OFTEN DO YOU HAVE 6 OR MORE DRINKS ON ONE OCCASION?: LESS THAN MONTHLY

## 2023-11-14 SDOH — ECONOMIC STABILITY: INCOME INSECURITY: IN THE PAST 12 MONTHS, HAS THE ELECTRIC, GAS, OIL, OR WATER COMPANY THREATENED TO SHUT OFF SERVICE IN YOUR HOME?: NO

## 2023-11-14 SDOH — SOCIAL STABILITY: SOCIAL INSECURITY
WITHIN THE LAST YEAR, HAVE YOU BEEN KICKED, HIT, SLAPPED, OR OTHERWISE PHYSICALLY HURT BY YOUR PARTNER OR EX-PARTNER?: NO

## 2023-11-14 SDOH — SOCIAL STABILITY: SOCIAL INSECURITY: DO YOU FEEL UNSAFE GOING BACK TO THE PLACE WHERE YOU ARE LIVING?: NO

## 2023-11-14 SDOH — SOCIAL STABILITY: SOCIAL INSECURITY: DOES ANYONE TRY TO KEEP YOU FROM HAVING/CONTACTING OTHER FRIENDS OR DOING THINGS OUTSIDE YOUR HOME?: NO

## 2023-11-14 SDOH — ECONOMIC STABILITY: FOOD INSECURITY: WITHIN THE PAST 12 MONTHS, YOU WORRIED THAT YOUR FOOD WOULD RUN OUT BEFORE YOU GOT MONEY TO BUY MORE.: NEVER TRUE

## 2023-11-14 SDOH — HEALTH STABILITY: MENTAL HEALTH
STRESS IS WHEN SOMEONE FEELS TENSE, NERVOUS, ANXIOUS, OR CAN'T SLEEP AT NIGHT BECAUSE THEIR MIND IS TROUBLED. HOW STRESSED ARE YOU?: NOT AT ALL

## 2023-11-14 SDOH — HEALTH STABILITY: MENTAL HEALTH: HOW OFTEN DO YOU HAVE A DRINK CONTAINING ALCOHOL?: MONTHLY OR LESS

## 2023-11-14 SDOH — SOCIAL STABILITY: SOCIAL NETWORK: ARE YOU MARRIED, WIDOWED, DIVORCED, SEPARATED, NEVER MARRIED, OR LIVING WITH A PARTNER?: MARRIED

## 2023-11-14 SDOH — ECONOMIC STABILITY: HOUSING INSECURITY: IN THE LAST 12 MONTHS, HOW MANY PLACES HAVE YOU LIVED?: 1

## 2023-11-14 SDOH — ECONOMIC STABILITY: FOOD INSECURITY: WITHIN THE PAST 12 MONTHS, THE FOOD YOU BOUGHT JUST DIDN'T LAST AND YOU DIDN'T HAVE MONEY TO GET MORE.: NEVER TRUE

## 2023-11-14 SDOH — ECONOMIC STABILITY: HOUSING INSECURITY
IN THE LAST 12 MONTHS, WAS THERE A TIME WHEN YOU DID NOT HAVE A STEADY PLACE TO SLEEP OR SLEPT IN A SHELTER (INCLUDING NOW)?: NO

## 2023-11-14 SDOH — SOCIAL STABILITY: SOCIAL INSECURITY: WITHIN THE LAST YEAR, HAVE YOU BEEN AFRAID OF YOUR PARTNER OR EX-PARTNER?: NO

## 2023-11-14 SDOH — SOCIAL STABILITY: SOCIAL INSECURITY: HAS ANYONE EVER THREATENED TO HURT YOUR FAMILY OR YOUR PETS?: NO

## 2023-11-14 SDOH — SOCIAL STABILITY: SOCIAL INSECURITY: WITHIN THE LAST YEAR, HAVE YOU BEEN HUMILIATED OR EMOTIONALLY ABUSED IN OTHER WAYS BY YOUR PARTNER OR EX-PARTNER?: NO

## 2023-11-14 SDOH — SOCIAL STABILITY: SOCIAL INSECURITY: HAVE YOU HAD THOUGHTS OF HARMING ANYONE ELSE?: YES

## 2023-11-14 SDOH — SOCIAL STABILITY: SOCIAL NETWORK: HOW OFTEN DO YOU ATTEND CHURCH OR RELIGIOUS SERVICES?: NEVER

## 2023-11-14 SDOH — SOCIAL STABILITY: SOCIAL NETWORK: HOW OFTEN DO YOU ATTENT MEETINGS OF THE CLUB OR ORGANIZATION YOU BELONG TO?: NEVER

## 2023-11-14 SDOH — SOCIAL STABILITY: SOCIAL INSECURITY: ARE THERE ANY APPARENT SIGNS OF INJURIES/BEHAVIORS THAT COULD BE RELATED TO ABUSE/NEGLECT?: NO

## 2023-11-14 SDOH — SOCIAL STABILITY: SOCIAL NETWORK: IN A TYPICAL WEEK, HOW MANY TIMES DO YOU TALK ON THE PHONE WITH FAMILY, FRIENDS, OR NEIGHBORS?: NEVER

## 2023-11-14 SDOH — SOCIAL STABILITY: SOCIAL INSECURITY
WITHIN THE LAST YEAR, HAVE TO BEEN RAPED OR FORCED TO HAVE ANY KIND OF SEXUAL ACTIVITY BY YOUR PARTNER OR EX-PARTNER?: NO

## 2023-11-14 SDOH — HEALTH STABILITY: MENTAL HEALTH: HOW MANY STANDARD DRINKS CONTAINING ALCOHOL DO YOU HAVE ON A TYPICAL DAY?: 1 OR 2

## 2023-11-14 SDOH — ECONOMIC STABILITY: TRANSPORTATION INSECURITY
IN THE PAST 12 MONTHS, HAS LACK OF TRANSPORTATION KEPT YOU FROM MEETINGS, WORK, OR FROM GETTING THINGS NEEDED FOR DAILY LIVING?: NO

## 2023-11-14 SDOH — SOCIAL STABILITY: SOCIAL INSECURITY: DO YOU FEEL ANYONE HAS EXPLOITED OR TAKEN ADVANTAGE OF YOU FINANCIALLY OR OF YOUR PERSONAL PROPERTY?: NO

## 2023-11-14 SDOH — ECONOMIC STABILITY: INCOME INSECURITY: HOW HARD IS IT FOR YOU TO PAY FOR THE VERY BASICS LIKE FOOD, HOUSING, MEDICAL CARE, AND HEATING?: NOT HARD AT ALL

## 2023-11-14 SDOH — SOCIAL STABILITY: SOCIAL INSECURITY: ARE YOU OR HAVE YOU BEEN THREATENED OR ABUSED PHYSICALLY, EMOTIONALLY, OR SEXUALLY BY ANYONE?: NO

## 2023-11-14 ASSESSMENT — COGNITIVE AND FUNCTIONAL STATUS - GENERAL
TOILETING: A LITTLE
PATIENT BASELINE BEDBOUND: NO
WALKING IN HOSPITAL ROOM: A LITTLE
STANDING UP FROM CHAIR USING ARMS: A LITTLE
MOVING TO AND FROM BED TO CHAIR: A LITTLE
MOBILITY SCORE: 19
CLIMB 3 TO 5 STEPS WITH RAILING: A LOT
DAILY ACTIVITIY SCORE: 23

## 2023-11-14 ASSESSMENT — ACTIVITIES OF DAILY LIVING (ADL)
DRESSING YOURSELF: INDEPENDENT
TOILETING: INDEPENDENT
BATHING: INDEPENDENT
LACK_OF_TRANSPORTATION: NO
ADEQUATE_TO_COMPLETE_ADL: YES
PATIENT'S MEMORY ADEQUATE TO SAFELY COMPLETE DAILY ACTIVITIES?: YES
HEARING - LEFT EAR: FUNCTIONAL
GROOMING: INDEPENDENT
FEEDING YOURSELF: INDEPENDENT
WALKS IN HOME: INDEPENDENT
HEARING - RIGHT EAR: FUNCTIONAL
JUDGMENT_ADEQUATE_SAFELY_COMPLETE_DAILY_ACTIVITIES: YES

## 2023-11-14 ASSESSMENT — PAIN SCALES - GENERAL
PAINLEVEL_OUTOF10: 5 - MODERATE PAIN
PAIN_LEVEL: 4
PAINLEVEL_OUTOF10: 5 - MODERATE PAIN
PAINLEVEL_OUTOF10: 8
PAINLEVEL_OUTOF10: 8
PAINLEVEL_OUTOF10: 4
PAINLEVEL_OUTOF10: 7
PAINLEVEL_OUTOF10: 7
PAINLEVEL_OUTOF10: 4
PAINLEVEL_OUTOF10: 4
PAINLEVEL_OUTOF10: 0 - NO PAIN
PAINLEVEL_OUTOF10: 7
PAINLEVEL_OUTOF10: 4

## 2023-11-14 ASSESSMENT — LIFESTYLE VARIABLES
AUDIT-C TOTAL SCORE: 0
HOW OFTEN DO YOU HAVE A DRINK CONTAINING ALCOHOL: NEVER
SKIP TO QUESTIONS 9-10: 1
AUDIT-C TOTAL SCORE: 0
SKIP TO QUESTIONS 9-10: 0
AUDIT-C TOTAL SCORE: 2
HOW OFTEN DO YOU HAVE 6 OR MORE DRINKS ON ONE OCCASION: NEVER
HOW MANY STANDARD DRINKS CONTAINING ALCOHOL DO YOU HAVE ON A TYPICAL DAY: PATIENT DOES NOT DRINK

## 2023-11-14 ASSESSMENT — COLUMBIA-SUICIDE SEVERITY RATING SCALE - C-SSRS
6. HAVE YOU EVER DONE ANYTHING, STARTED TO DO ANYTHING, OR PREPARED TO DO ANYTHING TO END YOUR LIFE?: NO
2. HAVE YOU ACTUALLY HAD ANY THOUGHTS OF KILLING YOURSELF?: NO
1. IN THE PAST MONTH, HAVE YOU WISHED YOU WERE DEAD OR WISHED YOU COULD GO TO SLEEP AND NOT WAKE UP?: NO

## 2023-11-14 ASSESSMENT — PATIENT HEALTH QUESTIONNAIRE - PHQ9
1. LITTLE INTEREST OR PLEASURE IN DOING THINGS: NOT AT ALL
2. FEELING DOWN, DEPRESSED OR HOPELESS: NOT AT ALL
SUM OF ALL RESPONSES TO PHQ9 QUESTIONS 1 & 2: 0

## 2023-11-14 ASSESSMENT — PAIN - FUNCTIONAL ASSESSMENT: PAIN_FUNCTIONAL_ASSESSMENT: 0-10

## 2023-11-14 NOTE — OP NOTE
Revision C5-7 anteior fusion, C3-T1 posterior cervical fusion, C4-5 laminectomy Operative Note     Date: 2023  OR Location: [facility] OR    Name: Joana Elizabeth, : 1950, Age: 73 y.o., MRN: [id number], Sex: female    Diagnosis  Pre-op Diagnosis     * Pseudarthrosis after fusion or arthrodesis [M96.0]     * Cervical spondylosis with myelopathy [M47.12] Post-op Diagnosis     * Pseudarthrosis after fusion or arthrodesis [M96.0]     * Cervical spondylosis with myelopathy [M47.12]     Procedures  Revision C5-7 anteior fusion, C3-T1 posterior cervical fusion, C4-5 laminectomy  43924 - GA ARTHRD ANT INTERBODY MIN DSC CRV BELOW C2    GA ARTHRD ANT NTRBD MIN DSC EA ADDL INTERSPACE [92184]  GA REMOVAL ANTERIOR INSTRUMENTATION [00571]  GA INSJ BIOMCHN DEV VRT CORPECTOMY DEFECT W/ARTHRD [96993]  GA ARTHRODESIS PST/PSTLAT TQ 1NTRSPC EA ADDL NTRSPC [06226]  GA LIM FACETECTOMY & FORAMOTOMY 1 VRT SGM CERVICAL [76689]  GA LIM FACETECTOMY&FORAMOT 1 VRT SGM EA ADDL SGM [51048]  GA POSTERIOR SEGMENTAL INSTRUMENTATION 3-6 VRT SEG [44276]  GA ALLOGRAFT FOR SPINE SURGERY ONLY MORSELIZED []  Surgeons      * Ravinder Hawkins - Primary    Resident/Fellow/Other Assistant:  Surgeon(s) and Role:     * Cara Rankin MD - Resident - Assisting    Procedure Summary  Anesthesia: General  ASA: III  Anesthesia Staff: Anesthesiologist: Jasbir Maldonado MD; Audrey Gonzalez MD  C-AA: WILBERTO Contreras; WILBERTO Mohr  Estimated Blood Loss: 50mL  Intra-op Medications: * No intraprocedure medications in log *           Anesthesia Record               Intraprocedure I/O Totals          Intake    Propofol Drip 0.00 mL    The total shown is the total volume documented since Anesthesia Start was filed.    Total Intake 0 mL          Specimen: No specimens collected     Staff:   Circulator: Charlotte Jordan RN  Relief Circulator: Juan Perez RN; Rich Basilio RN  Relief Scrub: Bryson Otero RN  Scrub Person: Eunice  Mohan Trivedi         Drains and/or Catheters:   Closed/Suction Drain Anterior Neck Bulb 7 Fr. (Active)       Tourniquet Times:         Implants:  Implants       Type Name Action Serial No.      Spinal Hardware PIN, DISTRACTION, CERVICAL, 14 MM, STAINLESS STEEL, DISPOSABLE, STERILE - [id number] Implanted      Spinal Hardware PIN, DISTRACTION, CERVICAL, 14 MM, STAINLESS STEEL, DISPOSABLE, STERILE - [id number] Implanted      Implant BONE MATRIX, OSTEOCEL, PRO CELLULAR, SMALL - [id number] - [id number] Implanted [id number]     Implant BONE MATRIX, OSTEOCEL, PRO CELLULAR, SMALL - [id number] - [id number] Implanted [id number]     Implant CERAMENT, BONE VOID FILLER 10ML - [id number] Implanted      Spinal Hardware SCREW, MAXCESS-C, 14MM DISTRACTION - [id number] Used, Not Implanted               Findings: Pseudoarthrosis of previous C6 corpectomy, fracture through the anterior two thirds of C7 with erosion of the T1 superior anterior endplate.  Cage was grossly loose, no evidence of solid fusion.    Indications: Joana Elizabeth is an 73 y.o. female who is having surgery for Pseudarthrosis after fusion or arthrodesis [M96.0]  Cervical spondylosis with myelopathy [M47.12].  Patient is nearly 1 year status post C6 corpectomy for cervical myelopathy.  She initially did well following surgery however she presented with recurrent symptoms.  CT and MRI demonstrated pseudoarthrosis with kicked out of the cage and collapse with fracture through the C7 vertebral body.  Due to loss of height there was pincer type effect at the C5-6 level with recurrent spinal cord compression.    The patient was seen in the preoperative area. The risks, benefits, complications, treatment options, non-operative alternatives, expected recovery and outcomes were discussed with the patient. The possibilities of reaction to medication, pulmonary aspiration, injury to surrounding structures, bleeding, recurrent infection, the need for additional  procedures, failure to diagnose a condition, and creating a complication requiring transfusion or operation were discussed with the patient. The patient concurred with the proposed plan, giving informed consent.  The site of surgery was properly noted/marked if necessary per policy. The patient has been actively warmed in preoperative area. Preoperative antibiotics have been ordered and given within 1 hours of incision. Venous thrombosis prophylaxis have been ordered including bilateral sequential compression devices    Procedure Details:   Patient's previous left-sided incision was reopened.  Careful dissection was carried out down to the prevertebral fascia.  The esophagus was mobilized medially and the carotid sheath was mobilized laterally.  The anterior plate was identified, all 4 screws were removed and the plate was removed without difficulty.  I was then able to remove the interbody cage which came apart with the superior end Still attached to the inferior aspect of the C5 vertebral body.  A curette was used to loosen the remaining piece of the cage and the corpectomy trough was inspected.  I was able to define the borders of the previous corpectomy, the C7 bone was found to be fractured to the anterior two third of the posterior third of the vertebral body intact.  The C7-T1 disc space was identified as was the remnant of the C6-7 and C5-6 disc spaces and Trimline retractors were placed.    Under microscopic visualization I completed the corpectomy of C7 using a high-speed bur.  The PLL was identified and taken down.  I was able to then remove the scar tissue within the corpectomy trough at C6 exposing the underlying dura at that level as well.  At this point the spinal cord was well decompressed.    The calipers for the X core mini cage was placed for measurement.  2 lordotic end caps were placed on the cage and the cage was filled with Osteocel pro.  The cage was then expanded into place throughout the  corpectomy trough from C5-T1.  The cage was backfilled with Osteocel once in place.  There is still a small gap at the anterior aspect of T1 due to bony erosion, small amount of Cerament was used to fill the void.  The O-arm was brought in for a single spin to verify cage positioning which was excellent at this point.  The bone void filled in nicely.  An anterior plate was then applied from C5-T1 in a standard fashion using screws in C5 and T1.  The final locking mechanism on the plate was tightened.    The wound was copiously irrigated with antibiotic saline.  Meticulous hemostasis was obtained.  The wound was closed in layers over a suction drain.  Exofin and dry sterile dressing were then applied.    We had initially planned for posterior fusion in addition to anterior fusion, but given the excellent fixation we decided to hold off on posterior instrumentation and fusion.  This was discussed with the patient prior to surgery and she was in agreement with the plan depending on the quality of anterior fixation.    Complications:  None; patient tolerated the procedure well.    Disposition: PACU - hemodynamically stable.  Condition: stable         Additional Details:     Attending Attestation: I was present for the entire procedure.    Ravinder Hawkins  Phone Number: [phone]

## 2023-11-14 NOTE — HOSPITAL COURSE
Joana Elizabeth is a 73 y.o. female who presented with psuedoarthrosis of previous C6 corpectomy and cervical stenosis. Patient is now s/p Revision C5-T1 anterior fusion on 11/14 by Dr. Hawkins. On the day of surgery, patient was identified in the pre-operative holding area and agreeable to proceed with surgery. Written consent was obtained.  Please see operative note for further details of this procedure. Patient received 24 hours of gagandeep-operative antibiotics. Patient recovered in the PACU before transfer to a regular nursing floor. Patient was started on oxycodone, tylenol, and dilaudid for pain control. Physical therapy recommended continued recovery at *** with continued physical therapy and wound care. On the day of discharge, patient was afebrile with stable vital signs. Patient was neurovascularly intact at time of discharge. Patient will follow-up with Dr. Hawkins in *** weeks for post-operative visit.

## 2023-11-14 NOTE — PROGRESS NOTES
"Joana Elizabeth is a 73 y.o. female on day 0 of admission presenting with Cervical stenosis of spinal canal.    Subjective: Evaluated in PACU following surgery. Patient reports pain is well controlled. Denies chest pain, shortness of breath, or nausea. Feeling comfortable overall.       Objective   Physical Exam:  - Constitutional: No acute distress, cooperative  - Eyes: EOM grossly intact  - Head/Neck: Trachea midline  - Respiratory/Thorax: Normal work of breathing  - Cardiovascular: RRR on peripheral palpation  - Gastrointestinal: Nondistended  - Psychological: Appropriate mood/behavior  - Skin: Warm and dry. Additional findings in musculoskeletal evaluation  - Musculoskeletal:  Spine Exam:  Postoperative dressings in place without strikethrough bleeding  Drain in place holding suction, serosanginous output    C5: SILT   Deltoid 5/5 Left; 5/5 Right  C6: SILT   Wrist Ext: 5/5 Left; 5/5 Right  C7: SILT   Triceps: 5/5 Left; 5/5 Right  C8: SILT   Finger flexion: 5/5 Left; 5/5 Right  T1: SILT    Interossei: 5/5 Left; 5/5 Right    Bicep Reflex 2+   Bilaterally  BR Reflex 2+   Bilaterally  Triceps Reflex 2+   Bilaterally    Hess: Negative    L1: SILT       L2: SILT      Hip flexors 5/5 Left; 5/5 Right  L3: SILT      Knee extension 5/5 Left; 5/5 Right  L4: SILT      Tib Ant. (Dorsiflexion) 5/5 Left; 5/5 Right  L5: SILT      EHL 5/5 Left; 5/5 Right  S1: SILT      Plantarflexion 5/5 Left; 5/5 Right    Babinkski: Intact  No clonus    Last Recorded Vitals  Blood pressure 133/76, pulse 75, temperature 36.2 °C (97.2 °F), temperature source Temporal, resp. rate 18, height 1.6 m (5' 3\"), weight 103 kg (226 lb 6.6 oz), SpO2 95 %.  Intake/Output last 3 Shifts:  No intake/output data recorded.    Relevant Results  Results for orders placed or performed during the hospital encounter of 11/14/23 (from the past 24 hour(s))   POCT GLUCOSE   Result Value Ref Range    POCT Glucose 178 (H) 74 - 99 mg/dL   POCT GLUCOSE   Result Value Ref " Range    POCT Glucose 226 (H) 74 - 99 mg/dL     Assessment/Plan   Principal Problem:    Cervical stenosis of spinal canal  Active Problems:    Cervical spinal stenosis    Pseudarthrosis after fusion or arthrodesis    Cervical spondylosis with myelopathy    Assessment:   73 y.o. female s/p Revision C5-T1 anterior fusion with Dr. Hawkins on 11/14.    Plan:  - Weightbearing Status: activity ad eli  - Precautions: none  - Imaging: postop c-spine CT given poor intra-op visualization in lower cervical spine  - Pain: Tylenol, oxycodone, dilaudid  - Perioperative ABx: Ancef  - DVT PPx: SCDs, no chemoprophylaxis indicated  - Dressing: exofin mesh, 4x4, tegaderm  - Drain: RAMANA x 1, maintain until POD1  - Kay: absent  - FEN: MIVFs; HLIV with good PO intake  - Diet: Clear liquid diet. Advance as tolerated if no nausea and + bowel sounds  - Pulm: IS every 2 hrs, maintain O2 sat >92%  - Bowel Regimen:   - Other consults: OT or PT    Dispo: admit to floor, dc pending PT/OT and pain control    Cara Rankin, PGY-1  Orthopedic Surgery  Pager [phone] (Epic Chat preferred)    While admitted, this patient will be followed by the Ortho Spine Team. Please contact below residents with any questions (available via Epic Chat).     First call: Alie Luna, PGY2  Second call: Luis Claudio, PGY4    On weekends and after 6PM:  At [facility]: Please reach out to the orthopaedic on-call resident ([phone])  At [facility]: Please reach out to the orthopaedic on-call CLARISSA or resident (please refer to Qgenda)

## 2023-11-14 NOTE — ANESTHESIA PREPROCEDURE EVALUATION
Patient: Joana Elizabeth    Procedure Information       Date/Time: 11/14/23 8142    Procedure: Revision C5-7 anteior fusion, C3-T1 posterior cervical fusion, C4-5 laminectomy (Spine Cervical) - combined anterior then posterior approach    Location: [facility] OR 06 / [facility] OR    Surgeons: Ravinder Hawkins MD            Relevant Problems   Anesthesia (within normal limits)   No history of complications History of anesthesia complications      Cardiovascular   (+) Benign essential HTN   (+) Chest pain   (+) Hyperlipidemia   (+) PVC (premature ventricular contraction)   (+) Rheumatoid arteritis (CMS/HCC)   (+) Secondary hypertension      Endocrine   (+) Controlled type 2 diabetes mellitus with complication, without long-term current use of insulin (CMS/HCC)   (+) Hypothyroidism   (+) Type 2 diabetes mellitus (CMS/HCC)      GI   (+) GERD without esophagitis      Neuro/Psych   (+) Anxiety   (+) Depression      Pulmonary   (+) Asthma   (+) Exertional shortness of breath   (+) WILLIAM on CPAP      Musculoskeletal   (+) Cervical spondylosis with myelopathy   (+) Cervical stenosis of spine   (+) Fibromyalgia   (+) Lumbar spondylosis   (+) Osteoarthritis of both knees   (+) Rheumatoid arteritis (CMS/HCC)      Other   (+) Cervical arthritis with myelopathy   (+) Rheumatoid arteritis (CMS/HCC)   (+) TMJ arthritis   (+) Undifferentiated inflammatory arthritis (CMS/HCC)       Clinical information reviewed:   Tobacco  Allergies    Med Hx  Surg Hx   Fam Hx  Soc Hx        NPO Detail:  NPO/Void Status  Date of Last Liquid: 11/13/23  Time of Last Liquid: 2100  Date of Last Solid: 11/13/23  Time of Last Solid: 2100         Physical Exam    Airway  Mallampati: II  TM distance: >3 FB  Neck ROM: limited     Cardiovascular    Dental - normal exam     Pulmonary    Abdominal            Anesthesia Plan    ASA 3     general     intravenous induction   Postoperative administration of opioids is intended.  Trial extubation is  planned.  Anesthetic plan and risks discussed with patient.

## 2023-11-14 NOTE — ANESTHESIA POSTPROCEDURE EVALUATION
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 fusion or arthrodesis [M96.0])      (Cervical spondylosis with myelopathy [M47.12])    Surgeons: Ravinder Hawkins MD Responsible Provider: Jasbir Maldonado MD    Anesthesia Type: general ASA Status: 3            Anesthesia Type: general    Vitals Value Taken Time   /67 11/14/23 1612   Temp 37 11/14/23 1612   Pulse 79 11/14/23 1612   Resp 14 11/14/23 1612   SpO2 95 11/14/23 1612       Anesthesia Post Evaluation    Patient location during evaluation: PACU  Patient participation: complete - patient participated  Level of consciousness: awake  Pain score: 4  Pain management: adequate  Multimodal analgesia pain management approach  Airway patency: patent  There was medical reason for not screening for obstructive sleep apnea and/or not using of two or more mitigation strategies.Cardiovascular status: acceptable, hemodynamically stable and blood pressure returned to baseline  Respiratory status: acceptable and face mask  Hydration status: acceptable  Postoperative Nausea and Vomiting: none      There were no known notable events for this encounter.

## 2023-11-14 NOTE — ANESTHESIA PROCEDURE NOTES
Airway  Date/Time: 11/14/2023 1:36 PM  Urgency: elective    Airway not difficult    Staffing  Performed: WILBERTO   Authorized by: Audrey Gonzalez MD    Performed by: WILBERTO Contreras  Patient location during procedure: OR    Indications and Patient Condition  Indications for airway management: anesthesia  Spontaneous Ventilation: absent  Sedation level: deep  Preoxygenated: yes  Patient position: sniffing  Mask difficulty assessment: 1 - vent by mask  Planned trial extubation    Final Airway Details  Final airway type: endotracheal airway      Successful airway: ETT  Cuffed: yes   Successful intubation technique: video laryngoscopy  Blade size: #3  ETT size (mm): 7.0  Cormack-Lehane Classification: grade III - view of epiglottis only  Placement verified by: chest auscultation and capnometry   Measured from: lips  ETT to lips (cm): 22  Number of attempts at approach: 1    Additional Comments  Grade III view with Rod (size 3 blade), view of epiglottis only and anterior vocal cords. Was able to flip epiglottis up with ETT and have a good view of the cords. ETT placed uneventfully.

## 2023-11-14 NOTE — PROGRESS NOTES
Pharmacy Medication History Review    Joana Elizabeth is a 73 y.o. female admitted for No Principal Problem: There is no principal problem currently on the Problem List. Please update the Problem List and refresh.. Pharmacy reviewed the patient's [id number] medications and allergies for accuracy.    The list below reflects the updated PTA list. Comments regarding how patient may be taking medications differently can be found in the Admit Orders Activity      acetaminophen (Tylenol) 500 mg tablet  Take 2 tablets (1,000 mg) by mouth 2 times a day. Informant: Self, Last Dose: 11/13/2023    albuterol 90 mcg/actuation inhaler  Inhale 1 puff every 4 hours if needed. Informant: Self, Starting 12/2/2019, Last Dose: 11/14/2023    aspirin 81 mg capsule  Take 1 tablet by mouth once daily. CHEW AND SWALLOW, Informant: Self, Starting 7/1/2023, Last Dose: 11/9/2023    atorvastatin (Lipitor) 40 mg tablet  Take 1 tablet (40 mg) by mouth once daily., Starting Mon 6/26/2023, Normal, Last Dose: 11/13/2023    biotin 10 mg tablet  Take 1 tablet (10 mg) by mouth once daily. Informant: Self, Starting 8/14/2017, Last Dose: 11/9/2023    calcium carbonate-vitamin D3 600 mg-20 mcg (800 unit) tablet  Caltrate with Vitamin D3 600 mg (1,500 mg)-800 unit tablet, Informant: Self, Last Dose: 11/9/2023    chlorhexidine (Hibiclens) 4 % external liquid  Apply topically once daily as needed (preoperative) for up to 5 days. Do not start before November 9, 2023., Starting Thu 11/9/2023, Until Tue 11/14/2023 at 2359, Normal, Last Dose: Not Recorded    chlorhexidine (Peridex) 0.12 % solution  Use 15 mL in the mouth or throat if needed (preoperative) for up to 5 days. Swish and spit. Do not swallow Do not start before November 9, 2023., Starting Thu 11/9/2023, Until Tue 11/14/2023 at 2359, Normal, Last Dose: Not Recorded    cholecalciferol (Vitamin D-3) 125 MCG (5000 UT) capsule  Take 1 capsule (125 mcg) by mouth once daily. Informant: Self, Last  Dose: 11/9/2023    coenzyme Q-10 200 mg capsule  Take 1 capsule (200 mg) by mouth once daily. Informant: Self, Starting 9/19/2022, Last Dose: 11/9/2023  Received from: 2 Outside Sources    cyanocobalamin (Vitamin B-12) 250 mcg tablet  Take 1 tablet (250 mcg) by mouth once daily. Informant: Self, Last Dose: 11/13/2023  Received from: [facility]    Entresto 24-26 mg tablet  Take 1 tablet by mouth 2 times a day., Starting Mon 6/26/2023, Normal, Last Dose: Unknown  Note written 11/14/2023 1254: Patient states her last dose was taken either last night (11/13) or this morning (11/14)     escitalopram (Lexapro) 20 mg tablet  Take 1 tablet (20 mg) by mouth once daily., Starting Mon 6/26/2023, Normal, Last Dose: 11/13/2023    ferrous sulfate 325 (65 Fe) MG tablet  Take 1 tablet (65 mg of iron) by mouth 2 times a day. Informant: Self, Last Dose: 11/13/2023    fexofenadine (Allegra) 180 mg tablet  Take 1 tablet (180 mg) by mouth once daily. Informant: Self, Last Dose: 11/13/2023    fluticasone (Flonase) 50 mcg/actuation nasal spray  Administer 1 spray into affected nostril(s) once daily. Informant: Self, Last Dose: 11/13/2023    folic acid (Folvite) 1 mg tablet  Take 1 tablet (1 mg) by mouth once daily. Informant: Self, Starting 12/13/2018, Last Dose: 11/13/2023    furosemide (Lasix) 20 mg tablet  Take 1 tablet (20 mg) by mouth once daily., Starting Fri 10/20/2023, Normal, Last Dose: 11/13/2023    gabapentin (Neurontin) 300 mg capsule  Take 1 capsule (300 mg) by mouth 2 times a day. Informant: Self, Last Dose: 11/13/2023    hydroxychloroquine (Plaquenil) 200 mg tablet  Take 1 tablet (200 mg) by mouth 2 times a day., Starting Mon 6/26/2023, Normal, Last Dose: 11/13/2023    Jardiance 10 mg  Take 1 tablet (10 mg) by mouth once daily., Starting Mon 6/26/2023, Normal, Last Dose: 11/9/2023    levothyroxine (Synthroid, Levoxyl) 100 mcg tablet  Take 1 tablet (100 mcg) by mouth once daily., Starting Mon 6/26/2023, Normal, Last Dose:  11/14/2023    magnesium oxide (Mag-Ox) 400 mg tablet  Take 2 tablets (800 mg) by mouth once daily. Informant: Self, Starting 9/19/2022, Last Dose: 11/13/2023    meloxicam (Mobic) 7.5 mg tablet  Take 1 tablet (7.5 mg) by mouth once daily., Starting Mon 10/30/2023, Normal, Last Dose: 11/9/2023    metFORMIN (Glucophage) 1,000 mg tablet  Take 1 tablet (1,000 mg) by mouth 2 times a day with meals., Starting Mon 6/26/2023, Normal, Last Dose: 11/13/2023    methocarbamol (Robaxin) 500 mg tablet  Take 1 tablet (500 mg) by mouth 3 times a day. Informant: Self, Last Dose: 11/13/2023    metoprolol succinate XL (Toprol-XL) 100 mg 24 hr tablet  Take 1 tablet (100 mg) by mouth once daily., Starting Mon 6/26/2023, Normal, Last Dose: 11/14/2023    multivit/folic acid/vit K1 (WOMEN'S 50 PLUS ADVANCED ORAL)  Take 1 tablet by mouth once daily. Informant: Self, Last Dose: 11/9/2023    pantoprazole (ProtoNix) 40 mg EC tablet  Take 1 tablet (40 mg) by mouth once daily., Starting Mon 6/26/2023, Normal, Last Dose: 11/14/2023    The list below reflects the updated allergy list. Please review each documented allergy for additional clarification and justification.  Allergies  Reviewed by Shannon Tejada [facility] on 11/14/2023        Severity Reactions Comments    Shellfish Containing Products High Anaphylaxis     Codeine Medium Nausea And Vomiting, Other GI Intolerance    Tramadol Medium Other, Itching tongue blistered    Celecoxib Low Rash             M2B service not offered prior to surgery, please reassess prior to patient discharge if Meds to Beds is desired.    Sources used to complete the med history include: Patient interview, OARRS, Care Everywhere, retail fill history    Below are additional concerns with the patient's PTA list.    Shannon Tejada [facility]   Transitions of Care Pharmacist   Meds Ambulatory and Retail Services  Please reach out via Secure Chat for questions, or if no response call [facility] or [facility]

## 2023-11-14 NOTE — H&P
H&P reviewed. The patient was examined and there are no changes to the H&P. Patient elects to proceed with surgery. Patient marked and consented.     Luis Claudio MD  Orthopaedic Surgery, PGY-4  Available on Epic Chat

## 2023-11-15 ENCOUNTER — APPOINTMENT (OUTPATIENT)
Dept: RADIOLOGY | Facility: HOSPITAL | Age: 73
DRG: 472 | End: 2023-11-15
Payer: MEDICARE

## 2023-11-15 VITALS
OXYGEN SATURATION: 92 % | WEIGHT: 226.41 LBS | RESPIRATION RATE: 17 BRPM | BODY MASS INDEX: 40.12 KG/M2 | HEIGHT: 63 IN | DIASTOLIC BLOOD PRESSURE: 75 MMHG | HEART RATE: 82 BPM | SYSTOLIC BLOOD PRESSURE: 118 MMHG | TEMPERATURE: 96.8 F

## 2023-11-15 LAB
GLUCOSE BLD MANUAL STRIP-MCNC: 257 MG/DL (ref 74–99)
GLUCOSE BLD MANUAL STRIP-MCNC: 330 MG/DL (ref 74–99)
GLUCOSE BLD MANUAL STRIP-MCNC: 386 MG/DL (ref 74–99)

## 2023-11-15 PROCEDURE — 82947 ASSAY GLUCOSE BLOOD QUANT: CPT

## 2023-11-15 PROCEDURE — 2500000002 HC RX 250 W HCPCS SELF ADMINISTERED DRUGS (ALT 637 FOR MEDICARE OP, ALT 636 FOR OP/ED): Performed by: STUDENT IN AN ORGANIZED HEALTH CARE EDUCATION/TRAINING PROGRAM

## 2023-11-15 PROCEDURE — 2500000001 HC RX 250 WO HCPCS SELF ADMINISTERED DRUGS (ALT 637 FOR MEDICARE OP): Performed by: STUDENT IN AN ORGANIZED HEALTH CARE EDUCATION/TRAINING PROGRAM

## 2023-11-15 PROCEDURE — 72125 CT NECK SPINE W/O DYE: CPT | Performed by: STUDENT IN AN ORGANIZED HEALTH CARE EDUCATION/TRAINING PROGRAM

## 2023-11-15 PROCEDURE — 97165 OT EVAL LOW COMPLEX 30 MIN: CPT | Mod: GO

## 2023-11-15 PROCEDURE — 97161 PT EVAL LOW COMPLEX 20 MIN: CPT | Mod: GP

## 2023-11-15 PROCEDURE — 2500000004 HC RX 250 GENERAL PHARMACY W/ HCPCS (ALT 636 FOR OP/ED): Performed by: STUDENT IN AN ORGANIZED HEALTH CARE EDUCATION/TRAINING PROGRAM

## 2023-11-15 PROCEDURE — 72125 CT NECK SPINE W/O DYE: CPT

## 2023-11-15 RX ORDER — NALOXONE HYDROCHLORIDE 0.4 MG/ML
0.2 INJECTION, SOLUTION INTRAMUSCULAR; INTRAVENOUS; SUBCUTANEOUS EVERY 5 MIN PRN
Status: DISCONTINUED | OUTPATIENT
Start: 2023-11-15 | End: 2023-11-15 | Stop reason: HOSPADM

## 2023-11-15 RX ORDER — ONDANSETRON HYDROCHLORIDE 2 MG/ML
4 INJECTION, SOLUTION INTRAVENOUS EVERY 8 HOURS PRN
Status: DISCONTINUED | OUTPATIENT
Start: 2023-11-15 | End: 2023-11-15 | Stop reason: HOSPADM

## 2023-11-15 RX ORDER — DIPHENHYDRAMINE HYDROCHLORIDE 50 MG/ML
12.5 INJECTION INTRAMUSCULAR; INTRAVENOUS EVERY 6 HOURS PRN
Status: DISCONTINUED | OUTPATIENT
Start: 2023-11-15 | End: 2023-11-15 | Stop reason: HOSPADM

## 2023-11-15 RX ORDER — METHOCARBAMOL 500 MG/1
1000 TABLET, FILM COATED ORAL 3 TIMES DAILY
Status: DISCONTINUED | OUTPATIENT
Start: 2023-11-15 | End: 2023-11-15 | Stop reason: HOSPADM

## 2023-11-15 RX ORDER — FERROUS SULFATE 325(65) MG
65 TABLET ORAL DAILY
Status: DISCONTINUED | OUTPATIENT
Start: 2023-11-16 | End: 2023-11-15 | Stop reason: HOSPADM

## 2023-11-15 RX ORDER — ONDANSETRON 4 MG/1
4 TABLET, FILM COATED ORAL EVERY 8 HOURS PRN
Status: DISCONTINUED | OUTPATIENT
Start: 2023-11-15 | End: 2023-11-15 | Stop reason: HOSPADM

## 2023-11-15 RX ORDER — AMOXICILLIN 250 MG
2 CAPSULE ORAL 2 TIMES DAILY
Status: DISCONTINUED | OUTPATIENT
Start: 2023-11-15 | End: 2023-11-15 | Stop reason: HOSPADM

## 2023-11-15 RX ORDER — KETOROLAC TROMETHAMINE 15 MG/ML
15 INJECTION, SOLUTION INTRAMUSCULAR; INTRAVENOUS EVERY 6 HOURS
Status: DISCONTINUED | OUTPATIENT
Start: 2023-11-15 | End: 2023-11-15 | Stop reason: HOSPADM

## 2023-11-15 RX ORDER — ACETAMINOPHEN 325 MG/1
975 TABLET ORAL EVERY 8 HOURS
Status: DISCONTINUED | OUTPATIENT
Start: 2023-11-15 | End: 2023-11-15 | Stop reason: HOSPADM

## 2023-11-15 RX ORDER — OXYCODONE HYDROCHLORIDE 5 MG/1
10 TABLET ORAL EVERY 4 HOURS PRN
Status: DISCONTINUED | OUTPATIENT
Start: 2023-11-15 | End: 2023-11-15 | Stop reason: HOSPADM

## 2023-11-15 RX ORDER — POLYETHYLENE GLYCOL 3350 17 G/17G
17 POWDER, FOR SOLUTION ORAL DAILY
Status: DISCONTINUED | OUTPATIENT
Start: 2023-11-15 | End: 2023-11-15 | Stop reason: HOSPADM

## 2023-11-15 RX ORDER — OXYCODONE HYDROCHLORIDE 5 MG/1
5 TABLET ORAL EVERY 4 HOURS PRN
Status: DISCONTINUED | OUTPATIENT
Start: 2023-11-15 | End: 2023-11-15 | Stop reason: HOSPADM

## 2023-11-15 RX ORDER — SCOLOPAMINE TRANSDERMAL SYSTEM 1 MG/1
1 PATCH, EXTENDED RELEASE TRANSDERMAL
Status: DISCONTINUED | OUTPATIENT
Start: 2023-11-15 | End: 2023-11-15 | Stop reason: HOSPADM

## 2023-11-15 RX ORDER — SODIUM CHLORIDE 9 MG/ML
100 INJECTION, SOLUTION INTRAVENOUS CONTINUOUS
Status: DISCONTINUED | OUTPATIENT
Start: 2023-11-15 | End: 2023-11-15 | Stop reason: HOSPADM

## 2023-11-15 RX ORDER — ACETAMINOPHEN 325 MG/1
650 TABLET ORAL EVERY 6 HOURS
Qty: 240 TABLET | Refills: 0 | Status: SHIPPED | OUTPATIENT
Start: 2023-11-15 | End: 2023-12-15

## 2023-11-15 RX ORDER — POLYETHYLENE GLYCOL 3350 17 G/17G
17 POWDER, FOR SOLUTION ORAL 2 TIMES DAILY
Qty: 28 PACKET | Refills: 0 | Status: SHIPPED | OUTPATIENT
Start: 2023-11-15 | End: 2023-11-29

## 2023-11-15 RX ORDER — DEXAMETHASONE SODIUM PHOSPHATE 4 MG/ML
8 INJECTION, SOLUTION INTRA-ARTICULAR; INTRALESIONAL; INTRAMUSCULAR; INTRAVENOUS; SOFT TISSUE EVERY 8 HOURS SCHEDULED
Status: DISCONTINUED | OUTPATIENT
Start: 2023-11-15 | End: 2023-11-15 | Stop reason: HOSPADM

## 2023-11-15 RX ORDER — OXYCODONE HYDROCHLORIDE 5 MG/1
5 TABLET ORAL EVERY 4 HOURS PRN
Qty: 42 TABLET | Refills: 0 | Status: SHIPPED | OUTPATIENT
Start: 2023-11-15 | End: 2023-11-22

## 2023-11-15 RX ORDER — METHOCARBAMOL 1000 MG/1
1000 TABLET, COATED ORAL EVERY 8 HOURS
Qty: 90 TABLET | Refills: 1 | Status: SHIPPED | OUTPATIENT
Start: 2023-11-15 | End: 2023-12-07

## 2023-11-15 RX ORDER — DOCUSATE SODIUM 100 MG/1
100 CAPSULE, LIQUID FILLED ORAL 2 TIMES DAILY
Qty: 60 CAPSULE | Refills: 0 | Status: SHIPPED | OUTPATIENT
Start: 2023-11-15 | End: 2023-12-15

## 2023-11-15 RX ORDER — HYDROMORPHONE HYDROCHLORIDE 1 MG/ML
0.5 INJECTION, SOLUTION INTRAMUSCULAR; INTRAVENOUS; SUBCUTANEOUS EVERY 4 HOURS PRN
Status: DISCONTINUED | OUTPATIENT
Start: 2023-11-15 | End: 2023-11-15 | Stop reason: HOSPADM

## 2023-11-15 RX ORDER — CEFAZOLIN SODIUM 2 G/100ML
2 INJECTION, SOLUTION INTRAVENOUS EVERY 8 HOURS
Status: COMPLETED | OUTPATIENT
Start: 2023-11-15 | End: 2023-11-15

## 2023-11-15 RX ADMIN — Medication 800 MG: at 08:46

## 2023-11-15 RX ADMIN — ATORVASTATIN CALCIUM 40 MG: 40 TABLET, FILM COATED ORAL at 01:00

## 2023-11-15 RX ADMIN — LEVOTHYROXINE SODIUM 100 MCG: 100 TABLET ORAL at 06:03

## 2023-11-15 RX ADMIN — CEFAZOLIN SODIUM 2 G: 2 INJECTION, SOLUTION INTRAVENOUS at 08:44

## 2023-11-15 RX ADMIN — PANTOPRAZOLE SODIUM 40 MG: 40 TABLET, DELAYED RELEASE ORAL at 06:03

## 2023-11-15 RX ADMIN — INSULIN LISPRO 10 UNITS: 100 INJECTION, SOLUTION INTRAVENOUS; SUBCUTANEOUS at 14:10

## 2023-11-15 RX ADMIN — CEFAZOLIN SODIUM 2 G: 2 INJECTION, SOLUTION INTRAVENOUS at 00:58

## 2023-11-15 RX ADMIN — ESCITALOPRAM OXALATE 20 MG: 10 TABLET ORAL at 08:46

## 2023-11-15 RX ADMIN — SCOPALAMINE 1 PATCH: 1 PATCH, EXTENDED RELEASE TRANSDERMAL at 01:01

## 2023-11-15 RX ADMIN — GABAPENTIN 300 MG: 300 CAPSULE ORAL at 08:46

## 2023-11-15 RX ADMIN — METOPROLOL SUCCINATE 100 MG: 100 TABLET, EXTENDED RELEASE ORAL at 08:58

## 2023-11-15 RX ADMIN — VITAM B12 250 MCG: 100 TAB at 08:46

## 2023-11-15 RX ADMIN — SENNOSIDES AND DOCUSATE SODIUM 2 TABLET: 8.6; 5 TABLET ORAL at 00:56

## 2023-11-15 RX ADMIN — GABAPENTIN 300 MG: 300 CAPSULE ORAL at 01:01

## 2023-11-15 RX ADMIN — METHOCARBAMOL 1000 MG: 500 TABLET ORAL at 14:10

## 2023-11-15 RX ADMIN — INSULIN LISPRO 6 UNITS: 100 INJECTION, SOLUTION INTRAVENOUS; SUBCUTANEOUS at 08:48

## 2023-11-15 RX ADMIN — ACETAMINOPHEN 975 MG: 325 TABLET ORAL at 00:57

## 2023-11-15 RX ADMIN — KETOROLAC TROMETHAMINE 15 MG: 15 INJECTION, SOLUTION INTRAMUSCULAR; INTRAVENOUS at 06:03

## 2023-11-15 RX ADMIN — FERROUS SULFATE TAB 325 MG (65 MG ELEMENTAL FE) 65 MG OF IRON: 325 (65 FE) TAB at 01:00

## 2023-11-15 RX ADMIN — DEXAMETHASONE SODIUM PHOSPHATE 8 MG: 4 INJECTION, SOLUTION INTRAMUSCULAR; INTRAVENOUS at 08:47

## 2023-11-15 RX ADMIN — KETOROLAC TROMETHAMINE 15 MG: 15 INJECTION, SOLUTION INTRAMUSCULAR; INTRAVENOUS at 12:12

## 2023-11-15 RX ADMIN — SACUBITRIL AND VALSARTAN 1 TABLET: 24; 26 TABLET, FILM COATED ORAL at 00:56

## 2023-11-15 RX ADMIN — FOLIC ACID 1 MG: 1 TABLET ORAL at 08:47

## 2023-11-15 RX ADMIN — FLUTICASONE PROPIONATE 1 SPRAY: 50 SPRAY, METERED NASAL at 08:47

## 2023-11-15 RX ADMIN — OXYCODONE HYDROCHLORIDE 5 MG: 5 TABLET ORAL at 00:57

## 2023-11-15 RX ADMIN — Medication 125 MCG: at 08:45

## 2023-11-15 RX ADMIN — ASPIRIN 81 MG: 81 TABLET, COATED ORAL at 08:46

## 2023-11-15 RX ADMIN — ACETAMINOPHEN 975 MG: 325 TABLET ORAL at 08:47

## 2023-11-15 RX ADMIN — SODIUM CHLORIDE 100 ML/HR: 9 INJECTION, SOLUTION INTRAVENOUS at 00:58

## 2023-11-15 RX ADMIN — HYDROXYCHLOROQUINE SULFATE 200 MG: 200 TABLET, FILM COATED ORAL at 08:45

## 2023-11-15 RX ADMIN — SENNOSIDES AND DOCUSATE SODIUM 2 TABLET: 8.6; 5 TABLET ORAL at 08:46

## 2023-11-15 RX ADMIN — DEXAMETHASONE SODIUM PHOSPHATE 8 MG: 4 INJECTION, SOLUTION INTRAMUSCULAR; INTRAVENOUS at 00:56

## 2023-11-15 RX ADMIN — SACUBITRIL AND VALSARTAN 1 TABLET: 24; 26 TABLET, FILM COATED ORAL at 08:45

## 2023-11-15 RX ADMIN — METHOCARBAMOL 1000 MG: 500 TABLET ORAL at 08:45

## 2023-11-15 RX ADMIN — HYDROXYCHLOROQUINE SULFATE 200 MG: 200 TABLET, FILM COATED ORAL at 00:58

## 2023-11-15 RX ADMIN — Medication 1 TABLET: at 08:46

## 2023-11-15 RX ADMIN — LORATADINE 10 MG: 10 TABLET ORAL at 08:46

## 2023-11-15 ASSESSMENT — COGNITIVE AND FUNCTIONAL STATUS - GENERAL
HELP NEEDED FOR BATHING: A LITTLE
WALKING IN HOSPITAL ROOM: A LITTLE
STANDING UP FROM CHAIR USING ARMS: A LITTLE
TOILETING: A LITTLE
DRESSING REGULAR UPPER BODY CLOTHING: A LITTLE
MOBILITY SCORE: 19
MOVING TO AND FROM BED TO CHAIR: A LITTLE
CLIMB 3 TO 5 STEPS WITH RAILING: A LOT
DAILY ACTIVITIY SCORE: 20
CLIMB 3 TO 5 STEPS WITH RAILING: A LITTLE
MOBILITY SCORE: 23
DRESSING REGULAR LOWER BODY CLOTHING: A LITTLE
DAILY ACTIVITIY SCORE: 24

## 2023-11-15 ASSESSMENT — PAIN - FUNCTIONAL ASSESSMENT
PAIN_FUNCTIONAL_ASSESSMENT: 0-10

## 2023-11-15 ASSESSMENT — PAIN SCALES - GENERAL
PAINLEVEL_OUTOF10: 2
PAINLEVEL_OUTOF10: 4
PAINLEVEL_OUTOF10: 4
PAINLEVEL_OUTOF10: 6

## 2023-11-15 ASSESSMENT — ACTIVITIES OF DAILY LIVING (ADL)
BATHING_ASSISTANCE: MINIMAL
ADL_ASSISTANCE: INDEPENDENT

## 2023-11-15 ASSESSMENT — PAIN DESCRIPTION - ORIENTATION: ORIENTATION: ANTERIOR

## 2023-11-15 ASSESSMENT — PAIN DESCRIPTION - LOCATION: LOCATION: NECK

## 2023-11-15 NOTE — DISCHARGE INSTR - DIET
Resume your normal diet.  You may need to start off eating softer foods and advance to a regular consistency diet as tolerated.

## 2023-11-15 NOTE — DISCHARGE INSTR - ACTIVITY
Activity with assistance.  Progressively walk 2 times a day with a wheeled walker.   No pushing, pulling, or lifting objects greater than 10 pounds until follow up appointment.  No heavy house or yard work.  You may shower once there has been no drainage from you incision for 48 hours.  You may not drive until your follow up appointment, or while taking narcotic medication.  You may not return to work until your follow appointment.  Wear your cervical neck brace at all times. It may be taken off for dressing changes and hygiene/ showering. Weaning out of your neck brace will be discussed at your postoperative appointment.

## 2023-11-15 NOTE — DISCHARGE INSTR - OTHER ORDERS
Wound Care  Neck (Anterior Cervical Spine) Incision  Change your dressing daily until there is no drainage from your incision site for 24-48 hours.  The surgical site may be left open to air once drainage has stopped.   Use an abdominal pad and paper tape for dressing changes  If there is a Prineo/Exofin dressing (strip) over your incision, do not remove it with your dressing changes.  The dressing will slowly lift away from the skin over time.   No lotions, creams, or tub soaks.  May use heat or ice to posterior neck and shoulders as needed for comfort. (It is common to have discomfort between the shoulder    blades and into the shoulders)

## 2023-11-15 NOTE — PROGRESS NOTES
Physical Therapy    Physical Therapy Evaluation    Patient Name: Joana Elizabeth  MRN: [id number]  Today's Date: 11/15/2023   Time Calculation  Start Time: 1240  Stop Time: 1249  Time Calculation (min): 9 min    Assessment/Plan   PT Assessment  Rehab Prognosis: Excellent  End of Session Communication: Bedside nurse  Assessment Comment: Pt with no PT needs at this moment. Functionally safe to d/c home  End of Session Patient Position: Bed, 3 rail up, Alarm on  IP OR SWING BED PT PLAN  Inpatient or Swing Bed: Inpatient  PT Plan  Treatment/Interventions: Bed mobility, Transfer training, Gait training, Stair training, Balance training, Neuromuscular re-education, Neurodevelopmental intervention, Strengthening, Range of motion, Therapeutic exercise, Therapeutic activity  PT Plan: PT Eval only  PT Eval Only Reason: No acute PT needs identified  PT Frequency: PT eval only  PT Discharge Recommendations: No PT needed after discharge  PT Recommended Transfer Status: Independent  PT - OK to Discharge: Yes      Subjective   General Visit Information:  General  Reason for Referral: Revision C5-T1 anterior fusion with Dr. Hawkins on 11/14.  Family/Caregiver Present: Yes  Caregiver Feedback:  present and supportive of treatment  Prior to Session Communication: Bedside nurse  Patient Position Received:  (Seated EOB)  General Comment: Pt was pleasant and agreeable to therapeutic evaluation on this date  Home Living:  Home Living  Type of Home: House  Lives With: Spouse  Home Adaptive Equipment: Walker rolling or standard (Rollator)  Home Layout: One level  Home Access: Stairs to enter with rails  Entrance Stairs-Number of Steps: 3  Prior Level of Function:  Prior Function Per Pt/Caregiver Report  Level of [place]: Independent with ADLs and functional transfers  Ambulatory Assistance: Independent  Prior Function Comments: Ind in all functional tasks prior to admission  Precautions:  Precautions  Medical Precautions: Fall  precautions  Post-Surgical Precautions: Spinal precautions    Objective     Cognition:  Cognition  Overall Cognitive Status: Within Functional Limits  Orientation Level: Oriented X4    General Assessments:      Activity Tolerance  Endurance: Endurance does not limit participation in activity    Sensation  Light Touch: No apparent deficits    Strength  Strength Comments: BLE: WFL    Postural Control  Postural Control: Within Functional Limits    Functional Assessments:     Bed Mobility  Bed Mobility: Yes  Bed Mobility 1  Bed Mobility 1: Sitting to supine  Level of Assistance 1: Distant supervision  Bed Mobility Comments 1: SBA provided on this date. Pt able to complete IND. Educated on use of log roll technique to perform bed mobility    Transfers  Transfer: Yes  Transfer 1  Technique 1: Sit to stand, Stand to sit  Transfer Level of Assistance 1: Distant supervision  Trials/Comments 1: SBA for safety, Pt able to complete IND. VCs for upright posturing and BUE support to achieve upright    Ambulation/Gait Training  Ambulation/Gait Training Performed: Yes  Ambulation/Gait Training 1  Surface 1: Level tile  Device 1:  (IV pole)  Assistance 1: Close supervision  Quality of Gait 1:  (Grossly WFL)  Comments/Distance (ft) 1: 2 x 100 ft. VCs for proper gait sequencing. SBA for safety    Stairs  Stairs: Yes  Stairs  Rails 1: Bilateral  Device 1: Railing  Assistance 1: Close supervision  Comment/Number of Steps 1: 4 steps up/down. Able to complete with SBA. VCs for sequencing and upright posturing during task    Extremity/Trunk Assessments:     RLE   RLE : Within Functional Limits  LLE   LLE : Within Functional Limits  Outcome Measures:  [facility] Basic Mobility  Turning from your back to your side while in a flat bed without using bedrails: None  Moving from lying on your back to sitting on the side of a flat bed without using bedrails: None  Moving to and from bed to chair (including a wheelchair): None  Standing up from a chair  using your arms (e.g. wheelchair or bedside chair): None  To walk in hospital room: None  Climbing 3-5 steps with railing: A little  Basic Mobility - Total Score: 23    Encounter Problems       Encounter Problems (Active)       Pain - Adult              Education Documentation  Precautions, taught by NAYELY Dietz at 11/15/2023  1:06 PM.  Learner: Patient  Readiness: Acceptance  Method: Explanation  Response: Verbalizes Understanding, Demonstrated Understanding    Mobility Training, taught by NAYELY Dietz at 11/15/2023  1:06 PM.  Learner: Patient  Readiness: Acceptance  Method: Explanation  Response: Verbalizes Understanding, Demonstrated Understanding    Education Comments  No comments found.

## 2023-11-15 NOTE — DISCHARGE INSTR - AVS FIRST PAGE
You are breathing faster than normal.  Fever of 100.4 F or higher.  Chills  Urinating less than 4 times per day.  Acting very sleepy and difficult to awaken.  Vomiting and not able to eat or drink for 12 hours  3 or more loose, watery bowel movements in 24 hours (Diarrhea)  Concerns over the appearance of your incision.    Return immediately to the ER:  Persistent bilateral arm pain and or numbness >24 hours.

## 2023-11-15 NOTE — CARE PLAN
Problem: Pain - Adult  Goal: Verbalizes/displays adequate comfort level or baseline comfort level  Outcome: Met     Problem: Safety - Adult  Goal: Free from fall injury  Outcome: Met     Problem: Chronic Conditions and Co-morbidities  Goal: Patient's chronic conditions and co-morbidity symptoms are monitored and maintained or improved  Outcome: Met     Problem: Diabetes  Goal: Maintain glucose levels >70mg/dl to <250mg/dl throughout shift  Outcome: Met  Goal: No changes in neurological exam by end of shift  Outcome: Met  Goal: Vital signs within normal range for age by end of shift  Outcome: Met  Goal: Increase self care and/or family involovement by end of shift  Outcome: Met     Problem: Pain  Goal: Takes deep breaths with improved pain control throughout the shift  Outcome: Met  Goal: Turns in bed with improved pain control throughout the shift  Outcome: Met  Goal: Walks with improved pain control throughout the shift  Outcome: Met  Goal: Performs ADL's with improved pain control throughout shift  Outcome: Met  Goal: Participates in PT with improved pain control throughout the shift  Outcome: Met  Goal: Free from opioid side effects throughout the shift  Outcome: Met  Goal: Free from acute confusion related to pain meds throughout the shift  Outcome: Met   The patient's goals for the shift include Get some sleep    The clinical goals for the shift include Pain under control

## 2023-11-15 NOTE — CARE PLAN
Problem: Discharge Planning  Goal: Discharge to home or other facility with appropriate resources  Outcome: Progressing     Problem: Diabetes  Goal: Achieve decreasing blood glucose levels by end of shift  Outcome: Progressing  Goal: Increase stability of blood glucose readings by end of shift  Outcome: Progressing  Goal: Maintain electrolyte levels within acceptable range throughout shift  Outcome: Progressing   The patient's goals for the shift include Get some sleep    The clinical goals for the shift include Pain under control    Over the shift, the patient did not make progress toward the following goals. Barriers to progression include none. Recommendations to address these barriers include none.

## 2023-11-15 NOTE — DISCHARGE SUMMARY
Discharge Diagnosis  Cervical stenosis of spinal canal    Issues Requiring Follow-Up  Routine postoperative follow up    Test Results Pending At Discharge  Pending Labs       No current pending labs.            Hospital Course  73 y.o. female who presented with pseudoarthrosis of previous C6 corpectomy with C7 fracture and erosion of T1 superior endplate and loose cage. Patient is now s/p revision C5-7 anterior fusion, C3-T1 posterior cervical fusion, C4-5 laminectomy on 11/14/23 by Dr. Hawkins. On the day of surgery, patient was identified in the pre-operative holding area and agreeable to proceed with surgery. Written consent was obtained.  Please see operative note for further details of this procedure. Patient received 24 hours of gagandeep-operative antibiotics. Patient recovered in the PACU before transfer to a regular nursing floor. Patient was started on a multimodal regimen for pain control and SCDs for DVT prophylaxis. Physical therapy recommended continued recovery at home. On the day of discharge, patient was afebrile with stable vital signs. Patient was neurovascularly intact to her baseline at time of discharge. Patient will follow-up with Dr. Hawkins in 2-3 weeks for post-operative visit.      Pertinent Physical Exam At Time of Discharge  Cervical collar in place     C5: SILT   Deltoid 3/5 Left; 4/5 Right  C6: SILT   Wrist Ext: 5/5 Left; 5/5 Right  C7: SILT   Triceps: 4/5 Left; 4/5 Right  C8: SILT   Finger flexion: 5/5 Left; 5/5 Right  T1: SILT    Interossei: 4/5 Left; 4/5 Right     L1: SILT       L2: SILT      Hip flexors 5/5 Left; 5/5 Right  L3: SILT      Knee extension 5/5 Left; 5/5 Right  L4: SILT      Tib Ant. (Dorsiflexion) 5/5 Left; 5/5 Right  L5: SILT      EHL 5/5 Left; 5/5 Right  S1: SILT      Plantar flexion 5/5 Left; 5/5 Right    Home Medications     Medication List      ASK your doctor about these medications     acetaminophen 500 mg tablet; Commonly known as: Tylenol; Ask about:   Which  instructions should I use?   albuterol 90 mcg/actuation inhaler   aspirin 81 mg capsule   atorvastatin 40 mg tablet; Commonly known as: Lipitor; Take 1 tablet (40   mg) by mouth once daily.   biotin 10 mg tablet   calcium carbonate-vitamin D3 600 mg-20 mcg (800 unit) tablet   * chlorhexidine 0.12 % solution; Commonly known as: Peridex; Use 15 mL   in the mouth or throat if needed (preoperative) for up to 5 days. Swish   and spit. Do not swallow Do not start before November 9, 2023.; Ask about:   Should I take this medication?   * chlorhexidine 4 % external liquid; Commonly known as: Hibiclens; Apply   topically once daily as needed (preoperative) for up to 5 days. Do not   start before November 9, 2023.; Ask about: Should I take this medication?   cholecalciferol 125 MCG (5000 UT) capsule; Commonly known as: Vitamin   D-3   coenzyme Q-10 200 mg capsule   cyanocobalamin 250 mcg tablet; Commonly known as: Vitamin B-12   Entresto 24-26 mg tablet; Generic drug: sacubitriL-valsartan; Take 1   tablet by mouth 2 times a day.   escitalopram 20 mg tablet; Commonly known as: Lexapro; Take 1 tablet (20   mg) by mouth once daily.   ferrous sulfate (325 mg ferrous sulfate) tablet   fexofenadine 180 mg tablet; Commonly known as: Allegra   fluticasone 50 mcg/actuation nasal spray; Commonly known as: Flonase   folic acid 1 mg tablet; Commonly known as: Folvite   furosemide 20 mg tablet; Commonly known as: Lasix; Take 1 tablet (20 mg)   by mouth once daily.   gabapentin 300 mg capsule; Commonly known as: Neurontin   hydroxychloroquine 200 mg tablet; Commonly known as: Plaquenil; Take 1   tablet (200 mg) by mouth 2 times a day.   Jardiance 10 mg; Generic drug: empagliflozin; Take 1 tablet (10 mg) by   mouth once daily.   levothyroxine 100 mcg tablet; Commonly known as: Synthroid, Levoxyl;   Take 1 tablet (100 mcg) by mouth once daily.   magnesium oxide 400 mg tablet; Commonly known as: Mag-Ox   meloxicam 7.5 mg tablet; Commonly known  as: Mobic; Take 1 tablet (7.5   mg) by mouth once daily.   metFORMIN 1,000 mg tablet; Commonly known as: Glucophage; Take 1 tablet   (1,000 mg) by mouth 2 times a day with meals.   methocarbamol 500 mg tablet; Commonly known as: Robaxin   metoprolol succinate  mg 24 hr tablet; Commonly known as:   Toprol-XL; Take 1 tablet (100 mg) by mouth once daily.   pantoprazole 40 mg EC tablet; Commonly known as: ProtoNix; Take 1 tablet   (40 mg) by mouth once daily.   [employer] Test strip; Generic drug: blood sugar diagnostic   WOMEN'S 50 PLUS ADVANCED ORAL  * This list has 2 medication(s) that are the same as other medications   prescribed for you. Read the directions carefully, and ask your doctor or   other care provider to review them with you.       Outpatient Follow-Up  Future Appointments   Date Time Provider Department Center   12/7/2023  1:00 PM Vonda Sanchez PA-C [id number] [facility]   1/9/2024 10:00 AM Beau Shrestha MD [id number] None   1/12/2024  9:30 AM January Garcia DO [id number] [facility]   1/15/2024 10:00 AM Pao Perez MD [id number] [facility]   5/2/2024  2:30 PM Anthony Maravilla MD [id number] [facility]       Luis Claudio MD

## 2023-11-15 NOTE — PROGRESS NOTES
Occupational Therapy    Evaluation    Patient Name: Joana Elizabeth  MRN: [id number]  Today's Date: 11/15/2023  Time Calculation  Start Time: 1224  Stop Time: 1239  Time Calculation (min): 15 min        Assessment:  End of Session Communication: Bedside nurse  End of Session Patient Position: direct handoff to PT /SPT   OT Assessment Results:  (Pt appears to be near baseline level re: I/ADL completion; pt and  report  to assist as needed during hosp stay and upon d/c home)    Plan:  OT Discharge Recommendations: No further acute OT, No OT needed after discharge      Subjective   General:  General  Reason for Referral: revision C5-T1 anterior fusion  Prior to Session Communication: Bedside nurse  Patient Position Received:  (seated EOB)  General Comment: Pt with soft-cervical collar donned upon arrival; willing to participate in therapy and assisted with ADLs and functional mobility this date.    Precautions:  Medical Precautions: Fall precautions  Post-Surgical Precautions: Spinal precautions  Precautions Comment: 1x RAMANA drain    Pain:  Pain Assessment  Pain Assessment: 0-10  Pain Score: 4  Pain Location: Neck  Pain Interventions: Repositioned, Relaxation technique    Objective   Cognition:  Overall Cognitive Status: Within Functional Limits  Orientation Level: Oriented X4  Safety/Judgement:  (Requires vc's throughout to maintain spinal precautions and implement fall prevention strategies during functional mobility /ambulation)    Home Living:  Type of Home: House  Lives With: Spouse  Home Adaptive Equipment: Walker rolling or standard (rollator)  Home Layout: One level  Home Access: Stairs to enter with rails  Entrance Stairs-Number of Steps: 3  Bathroom Shower/Tub: Tub/shower unit  Bathroom Toilet: Standard    Prior Function:  ADL Assistance: Independent  Homemaking Assistance: Independent  Ambulatory Assistance: Independent  Hand Dominance: Right  Prior Function Comments: Pt reports owning reacher, sock  aide, and shoe horn to assist (however, did not use prior to surgery)    IADL History:  Current License: Yes  Mode of Transportation: Car  Leisure and Hobbies: Enjoys spending time with family    ADL:  Eating Assistance: Independent (anticipate)  Grooming Assistance: Stand by (anticipate)  Bathing Assistance: Minimal (anticipte)  UE Dressing Assistance: Stand by (anticipate)  LE Dressing Assistance: Moderate (don /doff B socks; don underwear; assist and vc's provided to maintain spinal precautions)  Toileting Assistance with Device: Stand by (anticipate)    Bed Mobility/Transfers:   Transfer 1  Transfer From 1: Sit to, Stand to  Transfer to 1: Stand, Sit  Transfer Level of Assistance 1: Close supervision  Trials/Comments 1: HHA for safety; no LOB noted    Standing Balance:  Dynamic Standing Balance  Dynamic Standing-Comments: Pt completes functional ambulation >household distance with HHA; vc's provided throughout for spinal precautions (no twisting at neck) and fall prevention strategies     Vision:  Current Vision: Wears glasses all the time  Patient Visual Report:  (Pt reports no acute visual deficits)    Sensation:  Sensation Comment: Pt denies n/t; no apparent deficits    Strength:  Strength Comments: BUE WFL    Outcome Measures:  [facility] Daily Activity  Putting on and taking off regular lower body clothing: A little  Bathing (including washing, rinsing, drying): A little  Putting on and taking off regular upper body clothing: A little  Toileting, which includes using toilet, bedpan or urinal: A little  Taking care of personal grooming such as brushing teeth: None  Eating Meals: None  Daily Activity - Total Score: 20    OT Adult Other Outcome Measures  4AT: Negative      Education Documentation  Handouts, taught by Danna Cazares OT at 11/15/2023  2:32 PM.  Learner: Family, Patient  Readiness: Acceptance  Method: Explanation  Response: Verbalizes Understanding    Body Mechanics, taught by Danna ENNIS  ALIYAH Cazares at 11/15/2023  2:32 PM.  Learner: Family, Patient  Readiness: Acceptance  Method: Explanation  Response: Verbalizes Understanding    Precautions, taught by Danna Cazares OT at 11/15/2023  2:32 PM.  Learner: Family, Patient  Readiness: Acceptance  Method: Explanation  Response: Verbalizes Understanding    ADL Training, taught by Danna Cazares OT at 11/15/2023  2:32 PM.  Learner: Family, Patient  Readiness: Acceptance  Method: Explanation  Response: Verbalizes Understanding    Education Comments  No comments found.      EDUCATION:  Education Comment: Pt provided with handouts re: spinal precautions and techniques to implement during I/ADL completion; pt with good understanding via verbal expression      Danna Cazares (OTR/L, OTD)

## 2023-11-15 NOTE — PROGRESS NOTES
Orthopaedic Spine Surgery Progress Note    Subjective:  No acute events overnight. Pain controlled on current regimen. Patient denies chest pain, shortness of breath, fever or chills, and numbness or tingling.    Objective:  Vitals:  Vitals:    11/15/23 0407   BP: 118/75   Pulse: 82   Resp: 17   Temp: 36 °C (96.8 °F)   SpO2: 92%       Physical Examination:  - Constitutional: no acute distress, alert, cooperative  - Eyes: anicteric  - Head/Neck: normocephalic, atraumatic  - Respiratory/Thorax: normal work of breathing  - Cardiovascular: extremities warm and well perfused  - Gastrointestinal: non-distended  - Psychological: appropriate mood/behavior  - Skin: warm and dry; additional findings in musculoskeletal evaluation  - Musculoskeletal:    Spine Exam:  Cervical collar in place  RAMANA drain holding suction with minimal SS output    C5: SILT   Deltoid 3/5 Left; 4/5 Right  C6: SILT   Wrist Ext: 5/5 Left; 5/5 Right  C7: SILT   Triceps: 4/5 Left; 4/5 Right  C8: SILT   Finger flexion: 5/5 Left; 5/5 Right  T1: SILT    Interossei: 4/5 Left; 4/5 Right    L1: SILT       L2: SILT      Hip flexors 5/5 Left; 5/5 Right  L3: SILT      Knee extension 5/5 Left; 5/5 Right  L4: SILT      Tib Ant. (Dorsiflexion) 5/5 Left; 5/5 Right  L5: SILT      EHL 5/5 Left; 5/5 Right  S1: SILT      Plantar flexion 5/5 Left; 5/5 Right    Imaging:  CT C spine 11/15/23:  1. Interval revision C5-T1 discectomy and corpectomy, and placement  of strut graft as described above, with plate and screw now within  normal anatomic position. Otherwise, no acute finding.  2. Multilevel degenerative changes most prominent at C3-C4.    Assessment: 73 y.o. female with PMHx DM2, HLD, hypothyroidism, cardiomyopathy with pseudoarthrosis of previous C6 corpectomy with C7 fracture and erosion of T1 superior endplate and loose cage s/p revision C5-7 anterior fusion, C3-T1 posterior cervical fusion, C4-5 laminectomy by Dr. Hawkins on 11/14/23, with routine postoperative  recovery.    Plan:  - Weightbearing status: activity ad eli  - Precautions: soft cervical collar; no bending/twisting/lifting >10 lbs  - Imaging: postop CT C spine obtained and reviewed by Dr. Hawkins  - Pain: multimodal regimen including Tylenol, oxycodone, Dilaudid, roxabin, deccadron x24 hrs  - Perioperative antibiotics: Ancef x24 hrs  - DVT prophylaxis: SCDs, no chemoppx  - Dressing: surgical dressing in place, to be changed at time of drain removal  - Drain: RAMANA drain x1, to be removed POD 1  - Kay: no  - FEN: maintenance IV fluids; HLIV with good PO intake  - Diet: ADAT to diabetic diet with good bowel sounds and no dysphagia  - Pulm: encourage IS, maintain O2 sat >92%  - Bowel Regimen  - PT/OT consult  - Daily CBC, BMP    Dispo: pending PT/OT, anticipate home today after drain removal    Patient was staffed with attending surgeon, Dr. Hawkins.    Luis Claudio MD  Orthopaedic Surgery PGY-4    Orthopaedic Spine Service  Alie Luna DO (PGY-2)  Luis Claudio MD (PGY-4)  Available by Epic Chat    From 6pm-7am, weekends, holidays, and if no answer and there is an emergent issue, please page orthopaedic consult pager, [phone].

## 2023-11-15 NOTE — NURSING NOTE
Patient and souse given home-going instructions. No complaints or concerns voiced at this time. Next dose due reviewed with patient. All belongings taken with patient. Discharged via w/c in stable condition.

## 2023-11-15 NOTE — PROGRESS NOTES
I met with Joana at the bedside regarding discharge planning and home going needs. Patient lives at home with her  and she is usually independent with ADL's she does have a walker and cane in the home. Patient is not medically cleared for discharge pending therapy recommendations. I will continue to follow with a safe discharge plan.

## 2023-11-21 DIAGNOSIS — M47.12 CERVICAL ARTHRITIS WITH MYELOPATHY: Primary | ICD-10-CM

## 2023-11-21 RX ORDER — GABAPENTIN 300 MG/1
300 CAPSULE ORAL 2 TIMES DAILY
Qty: 180 CAPSULE | Refills: 1 | Status: SHIPPED | OUTPATIENT
Start: 2023-11-21 | End: 2024-04-16

## 2023-11-29 DIAGNOSIS — K21.9 GERD WITHOUT ESOPHAGITIS: ICD-10-CM

## 2023-11-29 DIAGNOSIS — E03.9 HYPOTHYROIDISM, UNSPECIFIED TYPE: ICD-10-CM

## 2023-11-29 RX ORDER — PANTOPRAZOLE SODIUM 40 MG/1
40 TABLET, DELAYED RELEASE ORAL
Qty: 90 TABLET | Refills: 3 | Status: SHIPPED | OUTPATIENT
Start: 2023-11-29

## 2023-11-29 RX ORDER — LEVOTHYROXINE SODIUM 100 UG/1
100 TABLET ORAL
Qty: 90 TABLET | Refills: 3 | Status: SHIPPED | OUTPATIENT
Start: 2023-11-29

## 2023-12-06 DIAGNOSIS — M48.02 CERVICAL STENOSIS OF SPINE: Primary | ICD-10-CM

## 2023-12-07 ENCOUNTER — OFFICE VISIT (OUTPATIENT)
Dept: ORTHOPEDIC SURGERY | Facility: CLINIC | Age: 73
End: 2023-12-07
Payer: MEDICARE

## 2023-12-07 ENCOUNTER — HOSPITAL ENCOUNTER (OUTPATIENT)
Dept: RADIOLOGY | Facility: HOSPITAL | Age: 73
Discharge: HOME | End: 2023-12-07
Payer: MEDICARE

## 2023-12-07 DIAGNOSIS — M48.02 CERVICAL STENOSIS OF SPINE: ICD-10-CM

## 2023-12-07 DIAGNOSIS — M48.02 CERVICAL STENOSIS OF SPINE: Primary | ICD-10-CM

## 2023-12-07 PROCEDURE — 72040 X-RAY EXAM NECK SPINE 2-3 VW: CPT

## 2023-12-07 PROCEDURE — 3052F HG A1C>EQUAL 8.0%<EQUAL 9.0%: CPT | Performed by: PHYSICIAN ASSISTANT

## 2023-12-07 PROCEDURE — 99024 POSTOP FOLLOW-UP VISIT: CPT | Performed by: PHYSICIAN ASSISTANT

## 2023-12-07 PROCEDURE — 3008F BODY MASS INDEX DOCD: CPT | Performed by: PHYSICIAN ASSISTANT

## 2023-12-07 PROCEDURE — 1111F DSCHRG MED/CURRENT MED MERGE: CPT | Performed by: PHYSICIAN ASSISTANT

## 2023-12-07 PROCEDURE — 72040 X-RAY EXAM NECK SPINE 2-3 VW: CPT | Performed by: RADIOLOGY

## 2023-12-07 PROCEDURE — 1159F MED LIST DOCD IN RCRD: CPT | Performed by: PHYSICIAN ASSISTANT

## 2023-12-07 PROCEDURE — 1036F TOBACCO NON-USER: CPT | Performed by: PHYSICIAN ASSISTANT

## 2023-12-07 PROCEDURE — 3048F LDL-C <100 MG/DL: CPT | Performed by: PHYSICIAN ASSISTANT

## 2023-12-07 PROCEDURE — 1160F RVW MEDS BY RX/DR IN RCRD: CPT | Performed by: PHYSICIAN ASSISTANT

## 2023-12-07 PROCEDURE — 1126F AMNT PAIN NOTED NONE PRSNT: CPT | Performed by: PHYSICIAN ASSISTANT

## 2023-12-07 RX ORDER — METHOCARBAMOL 500 MG/1
TABLET, FILM COATED ORAL
Qty: 60 TABLET | Refills: 2 | Status: SHIPPED | OUTPATIENT
Start: 2023-12-07

## 2023-12-07 ASSESSMENT — PAIN DESCRIPTION - DESCRIPTORS: DESCRIPTORS: ACHING;RADIATING;SPASM

## 2023-12-07 ASSESSMENT — PAIN SCALES - GENERAL: PAINLEVEL_OUTOF10: 3

## 2023-12-07 ASSESSMENT — PAIN - FUNCTIONAL ASSESSMENT: PAIN_FUNCTIONAL_ASSESSMENT: 0-10

## 2023-12-11 DIAGNOSIS — I15.9 SECONDARY HYPERTENSION: ICD-10-CM

## 2023-12-11 DIAGNOSIS — M43.10 ACQUIRED SPONDYLOLISTHESIS: ICD-10-CM

## 2023-12-11 DIAGNOSIS — M48.02 CERVICAL STENOSIS OF SPINE: ICD-10-CM

## 2023-12-11 DIAGNOSIS — M47.12 CERVICAL ARTHRITIS WITH MYELOPATHY: ICD-10-CM

## 2023-12-11 RX ORDER — MELOXICAM 7.5 MG/1
7.5 TABLET ORAL DAILY
Qty: 90 TABLET | Refills: 0 | Status: SHIPPED | OUTPATIENT
Start: 2023-12-11 | End: 2024-01-18 | Stop reason: SDUPTHER

## 2023-12-11 RX ORDER — MELOXICAM 7.5 MG/1
7.5 TABLET ORAL DAILY
COMMUNITY
End: 2023-12-11 | Stop reason: SDUPTHER

## 2023-12-11 RX ORDER — HYDROXYCHLOROQUINE SULFATE 200 MG/1
200 TABLET, FILM COATED ORAL 2 TIMES DAILY
Qty: 180 TABLET | Refills: 0 | Status: SHIPPED | OUTPATIENT
Start: 2023-12-11 | End: 2024-03-19

## 2023-12-11 RX ORDER — METOPROLOL SUCCINATE 100 MG/1
100 TABLET, EXTENDED RELEASE ORAL DAILY
Qty: 90 TABLET | Refills: 3 | Status: SHIPPED | OUTPATIENT
Start: 2023-12-11 | End: 2024-12-10

## 2023-12-12 NOTE — PROGRESS NOTES
Joana is 3 weeks postop from a C5-T1 ACDF with C6 and 7 corpectomies, performed Dr. Hawkins on 11/14.    She is gradually recovering from surgery.  She continues to have a hoarse voice.  She is not having difficulty swallowing.  She has residual posterior neck and shoulder pain.  She has no pain radiating down her arms.  She is not having difficulty ambulating.    On exam, balanced gait without assistive devices.  Full strength of the upper extremities bilaterally.  Incision healing well, no evidence of infection.    I personally reviewed x-rays of the cervical spine completed today.  There is no evidence of acute fracture or hardware failure.  Stable alignment of cervical fusion.    She can continue to increase activities as tolerated, no lifting greater than 20 pounds for another 3 weeks.  She can wean herself out of the soft collar and gradually increase her cervical range of motion.  She is going to continue with gentle exercising at home and ambulating daily.  A refill of methocarbamol 500 mg was sent to her pharmacy for muscle spasms.  She will follow-up with me in 3 months with repeat x-rays, cervical AP and lateral only.    **This note was dictated using speech recognition software and was not corrected for spelling or grammatical errors**

## 2024-01-02 ENCOUNTER — OFFICE VISIT (OUTPATIENT)
Dept: ORTHOPEDIC SURGERY | Facility: CLINIC | Age: 74
End: 2024-01-02
Payer: MEDICARE

## 2024-01-02 DIAGNOSIS — J38.00 VOCAL CORD WEAKNESS: Primary | ICD-10-CM

## 2024-01-02 DIAGNOSIS — R13.10 DYSPHAGIA, UNSPECIFIED TYPE: ICD-10-CM

## 2024-01-02 PROCEDURE — 3008F BODY MASS INDEX DOCD: CPT | Performed by: PHYSICIAN ASSISTANT

## 2024-01-02 PROCEDURE — 1036F TOBACCO NON-USER: CPT | Performed by: PHYSICIAN ASSISTANT

## 2024-01-02 PROCEDURE — 1126F AMNT PAIN NOTED NONE PRSNT: CPT | Performed by: PHYSICIAN ASSISTANT

## 2024-01-02 PROCEDURE — 99024 POSTOP FOLLOW-UP VISIT: CPT | Performed by: PHYSICIAN ASSISTANT

## 2024-01-02 NOTE — PROGRESS NOTES
Joana reports to clinic for a wound check, she is 7 weeks postop.    She was having some mild draining from her incision last week.  Currently no drainage.  Also recently she has choked several times, she states food is going into her windpipe and getting stuck.    On exam, incision is healing well no evidence of excessive redness or drainage.  Ambulating without assistive devices.    Her incision is healing without complication.  My concern for infection is very low.  If she continues to have intermittent drainage she can start Betadine swabs twice daily.  As for difficulty swallowing and recent choking she was given a referral to follow-up with ENT.  She is going to keep her already scheduled postop appoint with me from March.  All of her questions were answered today.    **This note was dictated using speech recognition software and was not corrected for spelling or grammatical errors**

## 2024-01-09 ENCOUNTER — OFFICE VISIT (OUTPATIENT)
Dept: ORTHOPEDIC SURGERY | Facility: CLINIC | Age: 74
End: 2024-01-09
Payer: MEDICARE

## 2024-01-09 DIAGNOSIS — M75.42 IMPINGEMENT SYNDROME OF LEFT SHOULDER: Primary | ICD-10-CM

## 2024-01-09 PROCEDURE — 1159F MED LIST DOCD IN RCRD: CPT | Performed by: ORTHOPAEDIC SURGERY

## 2024-01-09 PROCEDURE — 1160F RVW MEDS BY RX/DR IN RCRD: CPT | Performed by: ORTHOPAEDIC SURGERY

## 2024-01-09 PROCEDURE — 99213 OFFICE O/P EST LOW 20 MIN: CPT | Performed by: ORTHOPAEDIC SURGERY

## 2024-01-09 PROCEDURE — 2500000005 HC RX 250 GENERAL PHARMACY W/O HCPCS: Performed by: ORTHOPAEDIC SURGERY

## 2024-01-09 PROCEDURE — 3008F BODY MASS INDEX DOCD: CPT | Performed by: ORTHOPAEDIC SURGERY

## 2024-01-09 PROCEDURE — 1036F TOBACCO NON-USER: CPT | Performed by: ORTHOPAEDIC SURGERY

## 2024-01-09 PROCEDURE — 1126F AMNT PAIN NOTED NONE PRSNT: CPT | Performed by: ORTHOPAEDIC SURGERY

## 2024-01-09 PROCEDURE — 20611 DRAIN/INJ JOINT/BURSA W/US: CPT | Performed by: ORTHOPAEDIC SURGERY

## 2024-01-09 PROCEDURE — 2500000004 HC RX 250 GENERAL PHARMACY W/ HCPCS (ALT 636 FOR OP/ED): Performed by: ORTHOPAEDIC SURGERY

## 2024-01-09 RX ORDER — LIDOCAINE HYDROCHLORIDE 10 MG/ML
0.5 INJECTION INFILTRATION; PERINEURAL
Status: COMPLETED | OUTPATIENT
Start: 2024-01-09 | End: 2024-01-09

## 2024-01-09 RX ADMIN — LIDOCAINE HYDROCHLORIDE 0.5 ML: 10 INJECTION, SOLUTION INFILTRATION; PERINEURAL at 10:49

## 2024-01-09 RX ADMIN — TRIAMCINOLONE ACETONIDE 40 MG: 10 INJECTION, SUSPENSION INTRA-ARTICULAR; INTRALESIONAL at 10:49

## 2024-01-09 ASSESSMENT — ENCOUNTER SYMPTOMS
EYE DISCHARGE: 0
JOINT SWELLING: 1
TROUBLE SWALLOWING: 0
SHORTNESS OF BREATH: 0
FEVER: 0
WHEEZING: 0
CHILLS: 0

## 2024-01-09 ASSESSMENT — PAIN - FUNCTIONAL ASSESSMENT: PAIN_FUNCTIONAL_ASSESSMENT: 0-10

## 2024-01-09 ASSESSMENT — PAIN SCALES - GENERAL: PAINLEVEL_OUTOF10: 4

## 2024-01-09 NOTE — PROGRESS NOTES
Reason for Appointment  Recurrent L shoulder pain    History of Present Illness  Patient is a 73 y.o. female here today for follow-up evaluation of recurrent left shoulder pain.  Recent neck surgery done by Dr. Hawkins in November, she is doing well from that standpoint but continues to have significant left shoulder pain with sleeping and any lifting.  We have discussed the possible reverse shoulder replacement in the future but she is still recovering from her neck surgery.  Her biggest issue is being able to reach the back of her head with that arm.  No other changes in her past medical history, allergies, or medications.    Past Medical History:   Diagnosis Date    Abnormal levels of other serum enzymes 12/01/2020    Abnormal AST and ALT    Anemia, unspecified 12/28/2018    Postoperative anemia    Calculus of gallbladder without cholecystitis without obstruction 06/26/2019    Gallstones    Cardiomyopathy (CMS/[facility])     s/p ICD, last seen by Dr. Maravilla on 05/14/2023    Cataract     Chronic maxillary sinusitis 03/22/2018    Right maxillary sinusitis    COVID-19 10/02/2020    COVID-19 virus infection    Depression     Diabetes mellitus (CMS/HCC)     Dorsalgia, unspecified 10/15/2019    Mid-back pain, acute    Effusion, unspecified hand 10/14/2016    Swelling of hand joint    Essential (primary) hypertension 08/19/2021    Benign essential HTN    Gastroparesis 12/05/2019    Gastroparesis    History of falling 08/04/2021    Status post fall    Hyperlipidemia     Hypothyroidism     Localized edema 05/01/2018    Leg edema    Occipital neuralgia 02/10/2017    Occipital neuralgia of right side    Osteomyelitis, unspecified (CMS/HCC) 12/16/2019    Knee osteomyelits, right    Other amnesia 09/01/2020    Memory problem    Other intervertebral disc displacement, lumbar region 04/23/2020    Disc displacement, lumbar    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    Postherpetic neuralgia    Other specified disorders  of kidney and ureter 2019    Left renal mass    Personal history of other diseases of the circulatory system 2021    History of carotid artery stenosis    Personal history of other diseases of the digestive system 2019    History of biliary colic    Personal history of other infectious and parasitic diseases     History of scarlet fever    Personal history of other infectious and parasitic diseases 2017    History of herpes zoster    Personal history of other specified conditions 2015    History of right flank pain    Pneumonia, unspecified organism 2019    Left lower lobe pneumonia    Radiculopathy, cervical region 2020    Cervical radiculopathy    Radiculopathy, lumbar region 2020    Lumbar radiculitis    Repeated falls 2018    Recurrent falls    Sleep apnea     Spinal stenosis     cervical, scheduled for surgery 2023    Spondylosis without myelopathy or radiculopathy, cervical region 2017    Spondylosis of cervical region without myelopathy or radiculopathy    Spondylosis without myelopathy or radiculopathy, lumbar region 2020    Lumbar spondylosis    Stricture of artery (CMS/HCC) 2021    Subclavian artery stenosis, right    Trigger finger, right index finger 2021    Trigger index finger of right hand    Trigger finger, right ring finger 2021    Trigger ring finger of right hand    Unspecified asthma, uncomplicated 2020    Asthmatic bronchitis    Vision loss        Past Surgical History:   Procedure Laterality Date    BACK SURGERY  10/12/2017    Back Surgery    BACK SURGERY  2017    Lower Back Surgery    BACK SURGERY  2023    [facility]-C5,6,7    CARDIAC CATHETERIZATION       SECTION, CLASSIC  2013     Section    CHOLECYSTECTOMY      COLONOSCOPY      CT GUIDED PERCUTANEOUS BIOPSY MEDIASTINUM  2017    CT GUIDED PERCUTANEOUS BIOPSY MEDIASTINUM 2017 [facility]    DILATION AND  CURETTAGE OF UTERUS  05/17/2013    Dilation And Curettage    INSERT / REPLACE / REMOVE PACEMAKER      KNEE ARTHROPLASTY Bilateral     OTHER SURGICAL HISTORY  12/16/2019    Knee arthroscopy    TONSILLECTOMY  05/17/2013    Tonsillectomy    TUBAL LIGATION  05/17/2013    Tubal Ligation    UPPER GASTROINTESTINAL ENDOSCOPY         Medication Documentation Review Audit       Reviewed by Clare Dee PA-C (Physician Assistant) on 01/09/24 at 1043      Medication Order Taking? Sig Documenting Provider Last Dose Status   albuterol 90 mcg/actuation inhaler [id number] Yes Inhale 1 puff every 4 hours if needed. Historical Provider, MD Taking Active   aspirin 81 mg capsule [id number] Yes Take 1 tablet by mouth once daily. CHEW AND SWALLOW Historical Provider, MD Taking Active   atorvastatin (Lipitor) 40 mg tablet [id number] Yes Take 1 tablet (40 mg) by mouth once daily. LIANE Go Taking Active   biotin 10 mg tablet [id number] Yes Take 1 tablet (10 mg) by mouth once daily. Historical Provider, MD Taking Active   blood sugar diagnostic (T[place] Test) strip [id number] Yes TEST twice a day Historical Provider, MD Taking Active   calcium carbonate-vitamin D3 600 mg-20 mcg (800 unit) tablet [id number] Yes Caltrate with Vitamin D3 600 mg (1,500 mg)-800 unit tablet Historical Provider, MD Taking Active   cholecalciferol (Vitamin D-3) 125 MCG (5000 UT) capsule [id number] Yes Take 1 capsule (125 mcg) by mouth once daily. Historical Provider, MD Taking Active   coenzyme Q-10 200 mg capsule [id number] Yes Take 1 capsule (200 mg) by mouth once daily. Historical Provider, MD Taking Active   cyanocobalamin (Vitamin B-12) 250 mcg tablet [id number] Yes Take 1 tablet (250 mcg) by mouth once daily. Historical Provider, MD Taking Active   Entresto 24-26 mg tablet [id number] Yes Take 1 tablet by mouth 2 times a day. LIANE Go Taking Active   escitalopram (Lexapro) 20 mg tablet [id number] Yes Take 1 tablet (20 mg) by mouth once daily.  Thuy Garcia APRN-CNP Taking Active   ferrous sulfate 325 (65 Fe) MG tablet [id number] Yes Take 1 tablet by mouth 2 times a day. Historical Provider, MD Taking Active   fexofenadine (Allegra) 180 mg tablet [id number] Yes Take 1 tablet (180 mg) by mouth once daily. Historical Provider, MD Taking Active   fluticasone (Flonase) 50 mcg/actuation nasal spray [id number] Yes Administer 1 spray into affected nostril(s) once daily. Historical Provider, MD Taking Active   folic acid (Folvite) 1 mg tablet [id number] Yes Take 1 tablet (1 mg) by mouth once daily. @0900 Historical Provider, MD Taking Active   furosemide (Lasix) 20 mg tablet [id number] Yes Take 1 tablet (20 mg) by mouth once daily. January Garcia DO Taking Active   gabapentin (Neurontin) 300 mg capsule [id number] Yes Take 1 capsule (300 mg) by mouth 2 times a day. January Garcia DO Taking Active   hydroxychloroquine (Plaquenil) 200 mg tablet [id number] Yes Take 1 tablet (200 mg) by mouth 2 times a day. Pao Perez MD Taking Active   Jardiance 10 mg [id number] Yes Take 1 tablet (10 mg) by mouth once daily. Thuy Garcia APRN-CNP Taking Active   levothyroxine (Synthroid, Levoxyl) 100 mcg tablet [id number] Yes Take 1 tablet (100 mcg) by mouth once daily. January Garcia DO Taking Active   magnesium oxide (Mag-Ox) 400 mg tablet [id number] Yes Take 2 tablets (800 mg) by mouth once daily. Historical Provider, MD Taking Active   meloxicam (Mobic) 7.5 mg tablet [id number] Yes Take 1 tablet (7.5 mg) by mouth once daily. Pao Perez MD Taking Active   metFORMIN (Glucophage) 1,000 mg tablet [id number] Yes Take 1 tablet (1,000 mg) by mouth 2 times a day with meals. Thuy Garcia APRN-CNP Taking Active   methocarbamol (Robaxin) 500 mg tablet [id number] Yes Take 1-2 tabs every 8 hours as needed for spasms Vonda Sanchez PA-C Taking Active   metoprolol succinate XL (Toprol-XL) 100 mg 24 hr tablet [id number] Yes Take 1 tablet (100 mg) by mouth once daily. Anthony AVITIA  MD Taiwo Taking Active   multivit/folic acid/vit K1 (WOMEN'S 50 PLUS ADVANCED ORAL) [id number] Yes Take 1 tablet by mouth once daily. Historical Provider, MD Taking Active   pantoprazole (ProtoNix) 40 mg EC tablet [id number] Yes Take 1 tablet (40 mg) by mouth once daily. January Garcia, DO Taking Active                    Allergies   Allergen Reactions    Shellfish Containing Products Anaphylaxis    Codeine Nausea And Vomiting and Other     GI Intolerance    Tramadol Other and Itching     tongue blistered    Celecoxib Rash       Review of Systems   Constitutional:  Negative for chills and fever.   HENT:  Negative for postnasal drip and trouble swallowing.    Eyes:  Negative for discharge.   Respiratory:  Negative for shortness of breath and wheezing.    Cardiovascular:  Negative for chest pain.   Musculoskeletal:  Positive for joint swelling.   Skin:  Negative for pallor and rash.   All other systems reviewed and are negative.    Exam   On exam the left shoulder shows active forward flexion up to 130 degrees.  She has weakness with resisted external rotation but a functional deltoid.  Positive impingement signs on the left.  Decent motion of the elbows wrist and digits, DJD in the hands.  Good pulses and sensation in the upper extremity.    Assessment   Left shoulder impingement    Plan   She is not interested in any further surgical intervention at this point, she would like repeat injection today.  We sterilely injected under ultrasound guidance Kenalog lidocaine in the left shoulder subacromial space.  Patient understands the small risk of infection and the signs to look for as well as flare reaction.  Hopefully these give her good relief.  She can follow-up with us as needed.    L Inj/Asp: L subacromial bursa on 1/9/2024 10:49 AM  Indications: pain  Details: 22 G needle, ultrasound-guided  Medications: 0.5 mL lidocaine 10 mg/mL (1 %); 40 mg triamcinolone acetonide 10 mg/mL  Outcome: tolerated well, no  immediate complications    After discussing the risks and benefits of the procedure with proceeded with an injection.  Using ultrasound guidance we identified the acromion, humeral head and the subacromial bursa, images obtained. We then sterilely injected the left subacrominal space with a mixture of 40 mg of Kenalog and 2 cc of 1 % lidocaine. Pt tolerated the procedure well without any adverse reactions.   Procedure, treatment alternatives, risks and benefits explained, specific risks discussed. Consent was given by the patient. Immediately prior to procedure a time out was called to verify the correct patient, procedure, equipment, support staff and site/side marked as required. Patient was prepped and draped in the usual sterile fashion.         Written by Clare Shrestha saw, evaluated, and treated the patient with the PA

## 2024-01-12 ENCOUNTER — OFFICE VISIT (OUTPATIENT)
Dept: PRIMARY CARE | Facility: CLINIC | Age: 74
End: 2024-01-12
Payer: MEDICARE

## 2024-01-12 VITALS
DIASTOLIC BLOOD PRESSURE: 70 MMHG | TEMPERATURE: 97.8 F | BODY MASS INDEX: 39.33 KG/M2 | WEIGHT: 222 LBS | OXYGEN SATURATION: 96 % | HEART RATE: 81 BPM | SYSTOLIC BLOOD PRESSURE: 120 MMHG

## 2024-01-12 DIAGNOSIS — E11.8 CONTROLLED TYPE 2 DIABETES MELLITUS WITH COMPLICATION, WITHOUT LONG-TERM CURRENT USE OF INSULIN (MULTI): ICD-10-CM

## 2024-01-12 LAB — POC HEMOGLOBIN A1C: 8 % (ref 4.2–6.5)

## 2024-01-12 PROCEDURE — 1036F TOBACCO NON-USER: CPT | Performed by: FAMILY MEDICINE

## 2024-01-12 PROCEDURE — 1126F AMNT PAIN NOTED NONE PRSNT: CPT | Performed by: FAMILY MEDICINE

## 2024-01-12 PROCEDURE — 83036 HEMOGLOBIN GLYCOSYLATED A1C: CPT | Performed by: FAMILY MEDICINE

## 2024-01-12 PROCEDURE — 3074F SYST BP LT 130 MM HG: CPT | Performed by: FAMILY MEDICINE

## 2024-01-12 PROCEDURE — 3008F BODY MASS INDEX DOCD: CPT | Performed by: FAMILY MEDICINE

## 2024-01-12 PROCEDURE — 99214 OFFICE O/P EST MOD 30 MIN: CPT | Performed by: FAMILY MEDICINE

## 2024-01-12 PROCEDURE — 1159F MED LIST DOCD IN RCRD: CPT | Performed by: FAMILY MEDICINE

## 2024-01-12 PROCEDURE — 3078F DIAST BP <80 MM HG: CPT | Performed by: FAMILY MEDICINE

## 2024-01-12 ASSESSMENT — ENCOUNTER SYMPTOMS
ACTIVITY CHANGE: 0
SHORTNESS OF BREATH: 0
PALPITATIONS: 0
MYALGIAS: 0
NERVOUS/ANXIOUS: 0
NAUSEA: 0
DIZZINESS: 0
EYE ITCHING: 0
WEAKNESS: 0
COUGH: 0
DIARRHEA: 0
SLEEP DISTURBANCE: 0
ABDOMINAL PAIN: 0
APPETITE CHANGE: 0
SORE THROAT: 0
FEVER: 0
UNEXPECTED WEIGHT CHANGE: 0
WHEEZING: 0
FLANK PAIN: 0
BLOOD IN STOOL: 0
SINUS PRESSURE: 0
CONSTIPATION: 0
DYSURIA: 0
DYSPHORIC MOOD: 0
LIGHT-HEADEDNESS: 0
VOMITING: 0
EYE DISCHARGE: 0
JOINT SWELLING: 0
HEMATURIA: 0
HEADACHES: 0
RHINORRHEA: 0
NUMBNESS: 0
ARTHRALGIAS: 0

## 2024-01-12 NOTE — PROGRESS NOTES
Subjective   Patient ID: Joana Elizabeth is a 73 y.o. female who presents for Diabetes (Last A1C was completed on 10/10/23 resulting in 9.0.  Today in office 8.0.).    HPI improved glycemic control   GETTING CONTROL DUE TO HER CARDIAC ISSUES     Review of Systems   Constitutional:  Negative for activity change, appetite change, fever and unexpected weight change.   HENT:  Negative for congestion, ear pain, postnasal drip, rhinorrhea, sinus pressure and sore throat.    Eyes:  Negative for discharge, itching and visual disturbance.   Respiratory:  Negative for cough, shortness of breath and wheezing.    Cardiovascular:  Negative for chest pain, palpitations and leg swelling.   Gastrointestinal:  Negative for abdominal pain, blood in stool, constipation, diarrhea, nausea and vomiting.   Endocrine: Negative for cold intolerance, heat intolerance and polyuria.   Genitourinary:  Negative for dysuria, flank pain and hematuria.   Musculoskeletal:  Negative for arthralgias, gait problem, joint swelling and myalgias.   Skin:  Negative for rash.   Allergic/Immunologic: Negative for environmental allergies and food allergies.   Neurological:  Negative for dizziness, syncope, weakness, light-headedness, numbness and headaches.   Psychiatric/Behavioral:  Negative for dysphoric mood and sleep disturbance. The patient is not nervous/anxious.        Objective   /70   Pulse 81   Temp 36.6 °C (97.8 °F)   Wt 101 kg (222 lb)   SpO2 96%   BMI 39.33 kg/m²     Physical Exam  Vitals and nursing note reviewed.   Constitutional:       Appearance: Normal appearance.   HENT:      Head: Normocephalic.      Mouth/Throat:      Mouth: Mucous membranes are moist.   Cardiovascular:      Rate and Rhythm: Normal rate and regular rhythm.      Pulses: Normal pulses.      Heart sounds: Normal heart sounds. No murmur heard.     No friction rub. No gallop.   Pulmonary:      Effort: Pulmonary effort is normal. No respiratory distress.      Breath  sounds: Normal breath sounds. No wheezing.   Abdominal:      General: Bowel sounds are normal. There is no distension.      Palpations: Abdomen is soft.      Tenderness: There is no abdominal tenderness.   Musculoskeletal:         General: No deformity. Normal range of motion.   Skin:     General: Skin is warm and dry.      Capillary Refill: Capillary refill takes less than 2 seconds.   Neurological:      General: No focal deficit present.      Mental Status: She is alert and oriented to person, place, and time.   Psychiatric:         Mood and Affect: Mood normal.       Assessment/Plan   Diagnoses and all orders for this visit:  Controlled type 2 diabetes mellitus with complication, without long-term current use of insulin (CMS/[facility])  -     POCT glycosylated hemoglobin (Hb A1C) manually resulted

## 2024-01-15 ENCOUNTER — OFFICE VISIT (OUTPATIENT)
Dept: RHEUMATOLOGY | Facility: CLINIC | Age: 74
End: 2024-01-15
Payer: MEDICARE

## 2024-01-15 VITALS — BODY MASS INDEX: 39.33 KG/M2 | SYSTOLIC BLOOD PRESSURE: 110 MMHG | WEIGHT: 222 LBS | DIASTOLIC BLOOD PRESSURE: 60 MMHG

## 2024-01-15 DIAGNOSIS — M85.89 OTHER SPECIFIED DISORDERS OF BONE DENSITY AND STRUCTURE, MULTIPLE SITES: ICD-10-CM

## 2024-01-15 DIAGNOSIS — M06.4 UNDIFFERENTIATED INFLAMMATORY ARTHRITIS (MULTI): Primary | ICD-10-CM

## 2024-01-15 DIAGNOSIS — M15.9 OSTEOARTHRITIS OF MULTIPLE JOINTS, UNSPECIFIED OSTEOARTHRITIS TYPE: ICD-10-CM

## 2024-01-15 PROCEDURE — 99214 OFFICE O/P EST MOD 30 MIN: CPT | Performed by: INTERNAL MEDICINE

## 2024-01-15 PROCEDURE — 3074F SYST BP LT 130 MM HG: CPT | Performed by: INTERNAL MEDICINE

## 2024-01-15 PROCEDURE — 1157F ADVNC CARE PLAN IN RCRD: CPT | Performed by: INTERNAL MEDICINE

## 2024-01-15 PROCEDURE — 1160F RVW MEDS BY RX/DR IN RCRD: CPT | Performed by: INTERNAL MEDICINE

## 2024-01-15 PROCEDURE — 1159F MED LIST DOCD IN RCRD: CPT | Performed by: INTERNAL MEDICINE

## 2024-01-15 PROCEDURE — 3008F BODY MASS INDEX DOCD: CPT | Performed by: INTERNAL MEDICINE

## 2024-01-15 PROCEDURE — 1036F TOBACCO NON-USER: CPT | Performed by: INTERNAL MEDICINE

## 2024-01-15 PROCEDURE — 1126F AMNT PAIN NOTED NONE PRSNT: CPT | Performed by: INTERNAL MEDICINE

## 2024-01-15 PROCEDURE — 3078F DIAST BP <80 MM HG: CPT | Performed by: INTERNAL MEDICINE

## 2024-01-15 NOTE — PROGRESS NOTES
"Recheck  OA  /  Undiff inflam arthritis   Good days, bad days.  Disfiguration of multiple fingers since last visit     HPI - she had cervical surg in Nov and is doing well.   Arthritis is \"the same\"  Mobic helps - she didn't realize how much until she was off it for surg.  She has ongoing chronic back pain.  She prefers not to see pain mgmt.  No joint swelling.  Her fingers are stiff in AM.  She has contractures in her fingers but they don't hurt.   No CP, SOB, or GI.  No raynauds.  No numbness/tingling - then said occ AM CTS-dist numbness.  She has recent L shoulder injection from ortho last wk.  Sees eye dr.      PE  NAD  RRR no r/m/g  CTA  No edema  No synovitis  Contractures L 2nd/3rd fingers    A/P - OA and inflam arthritis - stable  Reviewed CMP and CBC from primary - [facility]  Check screening DEXA - no parental hip fx, she thinks  Consider neuro eval if finger numbness incr - ?if d/t cervical radic - had recent surg, CTS, etc  Reeval 1 yr or sooner PRN    Addendum - nl DEXA    Addendum - pt called stating that ophtho told her to stop hcq - however, the note said that they recommend decreasing hcq to 200-300mg daily because the current recommendations could \"overdose patient's [sic] who are overweight or obese\" but also recommend decr because of AMD changes.  Will decr to 300mg as her sx were not well controlled at 200mg.  If this doesn't work for her, could consider an add'l DMARD  "

## 2024-01-18 DIAGNOSIS — M48.02 CERVICAL STENOSIS OF SPINE: ICD-10-CM

## 2024-01-18 RX ORDER — MELOXICAM 7.5 MG/1
7.5 TABLET ORAL DAILY
Qty: 90 TABLET | Refills: 1 | Status: SHIPPED | OUTPATIENT
Start: 2024-01-18 | End: 2024-05-21

## 2024-01-25 ENCOUNTER — APPOINTMENT (OUTPATIENT)
Dept: RADIOLOGY | Facility: HOSPITAL | Age: 74
End: 2024-01-25
Payer: MEDICARE

## 2024-02-01 ENCOUNTER — HOSPITAL ENCOUNTER (OUTPATIENT)
Dept: RADIOLOGY | Facility: HOSPITAL | Age: 74
Discharge: HOME | End: 2024-02-01
Payer: MEDICARE

## 2024-02-01 DIAGNOSIS — M85.89 OTHER SPECIFIED DISORDERS OF BONE DENSITY AND STRUCTURE, MULTIPLE SITES: ICD-10-CM

## 2024-02-01 PROCEDURE — 77085 DXA BONE DENSITY AXL VRT FX: CPT | Performed by: RADIOLOGY

## 2024-02-01 PROCEDURE — 77085 DXA BONE DENSITY AXL VRT FX: CPT

## 2024-02-01 NOTE — LETTER
February 9, 2024     Joana Elizabeth  [address] [facility] [id number]      Dear Ms. Elizabeth:    Below are the results from your recent visit:    Resulted Orders   XR DEXA bone density axial skeleton w VFA    Narrative    Interpreted By:  Naga Davidson,   STUDY:  DEXA BONE DENSITY AXIAL SKELETON W VFA2/1/2024 9:35 am      INDICATION:  Signs/Symptoms:eval BMD.  The patient is a 73 y/o  year old F.      COMPARISON:  DEXA scan dated 4/14/2021      ACCESSION NUMBER(S):  [id number]      ORDERING CLINICIAN:  MIGDALIA CASTANON      TECHNIQUE:  DEXA BONE DENSITY AXIAL SKELETON W VFA      FINDINGS:  LEFT FEMUR -TOTAL  Bone Mineral Density: 1.084  T-Score 1.2   Z-Score  2.9  Bone Mineral Density change vs baseline: 2.3  Bone Mineral Density change vs previous: 3.3      LEFT FEMUR -NECK  Bone Mineral Density: 0.926  T-Score 0.7  Z-Score 2.7  Bone Mineral Density change vs baseline: Not reported  Bone Mineral Density change vs previous: Not reported      LEFT FOREARM  Total  Bone Mineral Density: 0.658  T-Score 1.5  Z-Score 3.8  UD  Bone Mineral Density: 0.529  T-Score 1.5 Z-Score 3.2  MID  Bone Mineral Density: 0.668  T-Score 1.1 Z-Score 3.5  1/3  Bone Mineral Density: 0.785  T-Score 1.5   Z-Score 4  Bone Mineral Density change vs baseline:  Not reported  Bone Mineral Density change vs previous:  Not reported      World Health Organization (WHO) criteria for post-menopausal,   Women:  Normal:         T-score at or above -1 SD  Osteopenia:   T-score between -1 and -2.5 SD  Osteoporosis: T-score at or below -2.5 SD          10-year Fracture Risk:  Major Osteoporotic Fracture  Not reported  Hip Fracture                        Not reported      Note:  If no FRAX score is reported, it is because:  Some T-score for Spine Total or Hip Total or Femoral Neck at or below  -2.5      Vertebral Deformity Assessment: Exam Date - 2/1/2024 9:35 am  -----------------------------------------------------------------  Vertebral Level                       Impression  -----------------------------------------------------------------  T4                                        Normal  T5                                        Normal  T6                                        Normal  T7                                        Normal  T8                                        Normal  T9                                        Normal  T10                                      Not reported  T11                                      Not reported  T12                                      Not reported  L1                                        Not reported  L2                                        Not reported  L3                                        Not reported  L4                                        Not reported  -----------------------------------------------------------------  A spine fracture indicates 5X risk for subsequent spine fracture  and 2X risk for subsequent hip fracture.      This exam was performed at [facility] on a [employer] Dexa Unit.        Impression    DEXA:  According to World Health Organization criteria,  classification is normal.      Followup recommended in 2 years or sooner as clinically warranted.      VFA:  NO VERTEBRAL FRACTURES WERE SEEN.      All images and detailed analysis are available on the  Radiology  PACS.      MACRO:  None          Signed by: Naga Davidson 2/1/2024 11:15 AM  Dictation workstation:   [id number]     Bone density is normal.  Please repeat in 2 years.    If you have any questions or concerns, please don't hesitate to call.         Sincerely,        January Garcia D.O.

## 2024-02-09 ENCOUNTER — TELEPHONE (OUTPATIENT)
Dept: PRIMARY CARE | Facility: CLINIC | Age: 74
End: 2024-02-09
Payer: MEDICARE

## 2024-02-09 NOTE — TELEPHONE ENCOUNTER
----- Message from January Garcia DO sent at 2/9/2024  8:19 AM EST -----  SHE HAD A NORMAL BONE DENSITY   CONSIDER REPEAT IN TWO YEARS   WILL DISCUSS A YEARLY WELLNESS

## 2024-02-19 DIAGNOSIS — E11.8 CONTROLLED TYPE 2 DIABETES MELLITUS WITH COMPLICATION, WITHOUT LONG-TERM CURRENT USE OF INSULIN (MULTI): ICD-10-CM

## 2024-02-19 RX ORDER — METFORMIN HYDROCHLORIDE 1000 MG/1
1000 TABLET ORAL
Qty: 180 TABLET | Refills: 0 | Status: SHIPPED | OUTPATIENT
Start: 2024-02-19 | End: 2024-04-22

## 2024-02-28 DIAGNOSIS — F32.A DEPRESSION, UNSPECIFIED DEPRESSION TYPE: ICD-10-CM

## 2024-02-28 DIAGNOSIS — E78.5 HYPERLIPIDEMIA, UNSPECIFIED HYPERLIPIDEMIA TYPE: ICD-10-CM

## 2024-02-29 RX ORDER — ATORVASTATIN CALCIUM 40 MG/1
40 TABLET, FILM COATED ORAL
Qty: 90 TABLET | Refills: 1 | Status: SHIPPED | OUTPATIENT
Start: 2024-02-29

## 2024-02-29 RX ORDER — ESCITALOPRAM OXALATE 20 MG/1
20 TABLET ORAL DAILY
Qty: 90 TABLET | Refills: 1 | Status: SHIPPED | OUTPATIENT
Start: 2024-02-29

## 2024-03-04 DIAGNOSIS — M48.02 CERVICAL STENOSIS OF SPINE: ICD-10-CM

## 2024-03-06 ENCOUNTER — APPOINTMENT (OUTPATIENT)
Dept: OTOLARYNGOLOGY | Facility: CLINIC | Age: 74
End: 2024-03-06
Payer: MEDICARE

## 2024-03-07 ENCOUNTER — OFFICE VISIT (OUTPATIENT)
Dept: ORTHOPEDIC SURGERY | Facility: CLINIC | Age: 74
End: 2024-03-07
Payer: MEDICARE

## 2024-03-07 ENCOUNTER — HOSPITAL ENCOUNTER (OUTPATIENT)
Dept: RADIOLOGY | Facility: HOSPITAL | Age: 74
Discharge: HOME | End: 2024-03-07
Payer: MEDICARE

## 2024-03-07 DIAGNOSIS — M48.02 CERVICAL STENOSIS OF SPINE: Primary | ICD-10-CM

## 2024-03-07 DIAGNOSIS — M48.02 CERVICAL STENOSIS OF SPINE: ICD-10-CM

## 2024-03-07 PROCEDURE — 1036F TOBACCO NON-USER: CPT | Performed by: PHYSICIAN ASSISTANT

## 2024-03-07 PROCEDURE — 3008F BODY MASS INDEX DOCD: CPT | Performed by: PHYSICIAN ASSISTANT

## 2024-03-07 PROCEDURE — 1159F MED LIST DOCD IN RCRD: CPT | Performed by: PHYSICIAN ASSISTANT

## 2024-03-07 PROCEDURE — 72040 X-RAY EXAM NECK SPINE 2-3 VW: CPT | Performed by: RADIOLOGY

## 2024-03-07 PROCEDURE — 1157F ADVNC CARE PLAN IN RCRD: CPT | Performed by: PHYSICIAN ASSISTANT

## 2024-03-07 PROCEDURE — 72040 X-RAY EXAM NECK SPINE 2-3 VW: CPT

## 2024-03-07 PROCEDURE — 99212 OFFICE O/P EST SF 10 MIN: CPT | Performed by: PHYSICIAN ASSISTANT

## 2024-03-07 PROCEDURE — 1126F AMNT PAIN NOTED NONE PRSNT: CPT | Performed by: PHYSICIAN ASSISTANT

## 2024-03-07 PROCEDURE — 1160F RVW MEDS BY RX/DR IN RCRD: CPT | Performed by: PHYSICIAN ASSISTANT

## 2024-03-07 ASSESSMENT — PAIN - FUNCTIONAL ASSESSMENT: PAIN_FUNCTIONAL_ASSESSMENT: 0-10

## 2024-03-07 ASSESSMENT — PAIN SCALES - GENERAL: PAINLEVEL_OUTOF10: 0 - NO PAIN

## 2024-03-07 ASSESSMENT — PAIN DESCRIPTION - DESCRIPTORS: DESCRIPTORS: ACHING;DULL;DISCOMFORT

## 2024-03-08 NOTE — PROGRESS NOTES
Joana is 3 months postop from a C5-T1 ACDF with C6 and 7 corpectomies, performed Dr. Hawkins on 11/14.    She continues to recover well from surgery.  Her voice has significantly improved.  She did seem to irritate her dysphagia and neck pain after going to the hair salon.  She does get some intermittent thoracic back pain when walking.  She is ambulating without assistive devices.    On exam, balanced gait without assistive devices.  Full strength of the upper extremities bilaterally.  Incision well-healed.    I personally reviewed x-rays of the cervical spine completed today.  There is no evidence of acute fracture or hardware failure.  Stable alignment of cervical fusion.    She can continue to increase activities as tolerated without restrictions.  She is going to continue with her at home exercise program, she is still not interested in outpatient physical therapy.  She does not need refills of any medications today.  She can follow-up with me on an as-needed basis.    **This note was dictated using speech recognition software and was not corrected for spelling or grammatical errors**

## 2024-03-12 DIAGNOSIS — F32.A DEPRESSION, UNSPECIFIED DEPRESSION TYPE: ICD-10-CM

## 2024-03-12 RX ORDER — ESCITALOPRAM OXALATE 20 MG/1
20 TABLET ORAL DAILY
Qty: 90 TABLET | Refills: 1 | OUTPATIENT
Start: 2024-03-12

## 2024-04-16 DIAGNOSIS — M47.12 CERVICAL ARTHRITIS WITH MYELOPATHY: ICD-10-CM

## 2024-04-16 RX ORDER — GABAPENTIN 300 MG/1
300 CAPSULE ORAL 2 TIMES DAILY
Qty: 180 CAPSULE | Refills: 3 | Status: SHIPPED | OUTPATIENT
Start: 2024-04-16

## 2024-04-19 DIAGNOSIS — E11.8 CONTROLLED TYPE 2 DIABETES MELLITUS WITH COMPLICATION, WITHOUT LONG-TERM CURRENT USE OF INSULIN (MULTI): ICD-10-CM

## 2024-04-22 RX ORDER — METFORMIN HYDROCHLORIDE 1000 MG/1
1000 TABLET ORAL
Qty: 180 TABLET | Refills: 3 | Status: SHIPPED | OUTPATIENT
Start: 2024-04-22

## 2024-04-26 ENCOUNTER — APPOINTMENT (OUTPATIENT)
Dept: PRIMARY CARE | Facility: CLINIC | Age: 74
End: 2024-04-26
Payer: MEDICARE

## 2024-04-29 ENCOUNTER — HOSPITAL ENCOUNTER (OUTPATIENT)
Dept: CARDIOLOGY | Facility: CLINIC | Age: 74
Discharge: HOME | End: 2024-04-29
Payer: MEDICARE

## 2024-04-29 DIAGNOSIS — I49.01 VF (VENTRICULAR FIBRILLATION) (MULTI): ICD-10-CM

## 2024-04-29 DIAGNOSIS — Z95.810 PRESENCE OF AUTOMATIC CARDIOVERTER/DEFIBRILLATOR (AICD): ICD-10-CM

## 2024-04-30 ENCOUNTER — APPOINTMENT (OUTPATIENT)
Dept: PRIMARY CARE | Facility: CLINIC | Age: 74
End: 2024-04-30
Payer: MEDICARE

## 2024-05-02 ENCOUNTER — APPOINTMENT (OUTPATIENT)
Dept: CARDIOLOGY | Facility: CLINIC | Age: 74
End: 2024-05-02
Payer: MEDICARE

## 2024-05-08 ENCOUNTER — APPOINTMENT (OUTPATIENT)
Dept: PRIMARY CARE | Facility: CLINIC | Age: 74
End: 2024-05-08
Payer: MEDICARE

## 2024-05-14 ENCOUNTER — OFFICE VISIT (OUTPATIENT)
Dept: PRIMARY CARE | Facility: CLINIC | Age: 74
End: 2024-05-14
Payer: MEDICARE

## 2024-05-14 VITALS
BODY MASS INDEX: 40.03 KG/M2 | WEIGHT: 226 LBS | SYSTOLIC BLOOD PRESSURE: 134 MMHG | DIASTOLIC BLOOD PRESSURE: 72 MMHG | HEART RATE: 87 BPM | OXYGEN SATURATION: 99 % | TEMPERATURE: 97.7 F

## 2024-05-14 DIAGNOSIS — E11.8 CONTROLLED TYPE 2 DIABETES MELLITUS WITH COMPLICATION, WITHOUT LONG-TERM CURRENT USE OF INSULIN (MULTI): ICD-10-CM

## 2024-05-14 DIAGNOSIS — R10.9 ACUTE RIGHT FLANK PAIN: Primary | ICD-10-CM

## 2024-05-14 LAB
POC ALBUMIN /CREATININE RATIO MANUALLY ENTERED: <30 UG/MG CREAT
POC APPEARANCE, URINE: CLEAR
POC BILIRUBIN, URINE: NEGATIVE
POC BLOOD, URINE: NEGATIVE
POC COLOR, URINE: YELLOW
POC GLUCOSE, URINE: ABNORMAL MG/DL
POC HEMOGLOBIN A1C: 9.1 % (ref 4.2–6.5)
POC KETONES, URINE: NEGATIVE MG/DL
POC LEUKOCYTES, URINE: NEGATIVE
POC NITRITE,URINE: NEGATIVE
POC PH, URINE: 5.5 PH
POC PROTEIN, URINE: NEGATIVE MG/DL
POC SPECIFIC GRAVITY, URINE: 1.01
POC URINE ALBUMIN: 10 MG/L
POC URINE CREATININE: 10 MG/DL
POC UROBILINOGEN, URINE: 0.2 EU/DL

## 2024-05-14 PROCEDURE — 99214 OFFICE O/P EST MOD 30 MIN: CPT | Performed by: FAMILY MEDICINE

## 2024-05-14 PROCEDURE — 81003 URINALYSIS AUTO W/O SCOPE: CPT | Performed by: FAMILY MEDICINE

## 2024-05-14 PROCEDURE — 1157F ADVNC CARE PLAN IN RCRD: CPT | Performed by: FAMILY MEDICINE

## 2024-05-14 PROCEDURE — 82044 UR ALBUMIN SEMIQUANTITATIVE: CPT | Performed by: FAMILY MEDICINE

## 2024-05-14 PROCEDURE — 1160F RVW MEDS BY RX/DR IN RCRD: CPT | Performed by: FAMILY MEDICINE

## 2024-05-14 PROCEDURE — 3078F DIAST BP <80 MM HG: CPT | Performed by: FAMILY MEDICINE

## 2024-05-14 PROCEDURE — 83036 HEMOGLOBIN GLYCOSYLATED A1C: CPT | Performed by: FAMILY MEDICINE

## 2024-05-14 PROCEDURE — 3075F SYST BP GE 130 - 139MM HG: CPT | Performed by: FAMILY MEDICINE

## 2024-05-14 PROCEDURE — 1159F MED LIST DOCD IN RCRD: CPT | Performed by: FAMILY MEDICINE

## 2024-05-14 PROCEDURE — 1036F TOBACCO NON-USER: CPT | Performed by: FAMILY MEDICINE

## 2024-05-14 ASSESSMENT — ENCOUNTER SYMPTOMS
SHORTNESS OF BREATH: 0
SORE THROAT: 0
ACTIVITY CHANGE: 0
BLOOD IN STOOL: 0
FEVER: 0
SLEEP DISTURBANCE: 0
JOINT SWELLING: 0
WHEEZING: 0
DIZZINESS: 0
PALPITATIONS: 0
NERVOUS/ANXIOUS: 0
NUMBNESS: 0
MYALGIAS: 0
COUGH: 0
DIARRHEA: 0
HEMATURIA: 0
LIGHT-HEADEDNESS: 0
DYSPHORIC MOOD: 0
VOMITING: 0
EYE ITCHING: 0
EYE DISCHARGE: 0
DYSURIA: 0
WEAKNESS: 0
ARTHRALGIAS: 0
FLANK PAIN: 0
CONSTIPATION: 0
SINUS PRESSURE: 0
NAUSEA: 0
UNEXPECTED WEIGHT CHANGE: 0
RHINORRHEA: 0
ABDOMINAL PAIN: 0
APPETITE CHANGE: 0
HEADACHES: 0

## 2024-05-14 NOTE — PROGRESS NOTES
Subjective   Patient ID: Joana Elizabeth is a 74 y.o. female who presents for No chief complaint on file..    HPI PATIENT IS HERE FOR FOLLOW UP ON HER DIABETES   3 MONTHS AGO HER A1C WAS 8    TODAY IT IS 9.1  WAS 6 WEEKS WITHOUT METFORMIN   IT WAS 9 THE TIME BEFORE   HER CARDIOLOGIST IS URGING HER TO GET CONTROL OF HER DIABETES   SHE FOLLOWS WITH DR MCDONALD  SHE HAS AN AICD FOR V FIB AND THIS IS CHECKED REGULARLY   SHE SEES DR CASTANON IN RHEUMATOLOGY     Review of Systems   Constitutional:  Negative for activity change, appetite change, fever and unexpected weight change.   HENT:  Negative for congestion, ear pain, postnasal drip, rhinorrhea, sinus pressure and sore throat.    Eyes:  Negative for discharge, itching and visual disturbance.   Respiratory:  Negative for cough, shortness of breath and wheezing.    Cardiovascular:  Negative for chest pain, palpitations and leg swelling.   Gastrointestinal:  Negative for abdominal pain, blood in stool, constipation, diarrhea, nausea and vomiting.   Endocrine: Negative for cold intolerance, heat intolerance and polyuria.   Genitourinary:  Negative for dysuria, flank pain and hematuria.   Musculoskeletal:  Negative for arthralgias, gait problem, joint swelling and myalgias.   Skin:  Negative for rash.   Allergic/Immunologic: Negative for environmental allergies and food allergies.   Neurological:  Negative for dizziness, syncope, weakness, light-headedness, numbness and headaches.   Psychiatric/Behavioral:  Negative for dysphoric mood and sleep disturbance. The patient is not nervous/anxious.        Objective   There were no vitals taken for this visit.    Physical Exam  Vitals and nursing note reviewed.   Constitutional:       Appearance: Normal appearance.   HENT:      Head: Normocephalic.   Cardiovascular:      Rate and Rhythm: Normal rate and regular rhythm.      Pulses: Normal pulses.           Dorsalis pedis pulses are 2+ on the right side and 2+ on the left side.         Posterior tibial pulses are 2+ on the right side and 2+ on the left side.      Heart sounds: Normal heart sounds. No murmur heard.     No friction rub. No gallop.   Pulmonary:      Effort: Pulmonary effort is normal. No respiratory distress.      Breath sounds: Normal breath sounds. No wheezing.   Abdominal:      General: Bowel sounds are normal. There is no distension.      Palpations: Abdomen is soft.      Tenderness: There is no abdominal tenderness.   Musculoskeletal:         General: No deformity. Normal range of motion.   Feet:      Right foot:      Protective Sensation:   3 sites sensed.      Skin integrity: Dry skin present.      Toenail Condition: Right toenails are normal.      Left foot:      Protective Sensation: 3 sites tested.  3 sites sensed.      Skin integrity: Dry skin present.      Toenail Condition: Left toenails are normal.   Skin:     General: Skin is warm and dry.      Capillary Refill: Capillary refill takes less than 2 seconds.   Neurological:      General: No focal deficit present.      Mental Status: She is alert and oriented to person, place, and time.   Psychiatric:         Mood and Affect: Mood normal.       Assessment/Plan   Problem List Items Addressed This Visit             ICD-10-CM    Controlled type 2 diabetes mellitus with complication, without long-term current use of insulin (Multi) E11.8    Relevant Orders    POCT glycosylated hemoglobin (Hb A1C) manually resulted (Completed)

## 2024-05-21 DIAGNOSIS — M48.02 CERVICAL STENOSIS OF SPINE: ICD-10-CM

## 2024-05-21 RX ORDER — MELOXICAM 7.5 MG/1
7.5 TABLET ORAL DAILY
Qty: 90 TABLET | Refills: 1 | Status: SHIPPED | OUTPATIENT
Start: 2024-05-21

## 2024-05-30 ENCOUNTER — OFFICE VISIT (OUTPATIENT)
Dept: CARDIOLOGY | Facility: CLINIC | Age: 74
End: 2024-05-30
Payer: MEDICARE

## 2024-05-30 VITALS
RESPIRATION RATE: 16 BRPM | SYSTOLIC BLOOD PRESSURE: 108 MMHG | OXYGEN SATURATION: 95 % | DIASTOLIC BLOOD PRESSURE: 73 MMHG | BODY MASS INDEX: 39.69 KG/M2 | HEIGHT: 63 IN | WEIGHT: 224 LBS | HEART RATE: 74 BPM

## 2024-05-30 DIAGNOSIS — Z95.810 ICD (IMPLANTABLE CARDIOVERTER-DEFIBRILLATOR) IN PLACE: Primary | ICD-10-CM

## 2024-05-30 PROCEDURE — 99214 OFFICE O/P EST MOD 30 MIN: CPT | Performed by: NURSE PRACTITIONER

## 2024-05-30 PROCEDURE — 1036F TOBACCO NON-USER: CPT | Performed by: NURSE PRACTITIONER

## 2024-05-30 PROCEDURE — 3078F DIAST BP <80 MM HG: CPT | Performed by: NURSE PRACTITIONER

## 2024-05-30 PROCEDURE — 1160F RVW MEDS BY RX/DR IN RCRD: CPT | Performed by: NURSE PRACTITIONER

## 2024-05-30 PROCEDURE — 1159F MED LIST DOCD IN RCRD: CPT | Performed by: NURSE PRACTITIONER

## 2024-05-30 PROCEDURE — 3074F SYST BP LT 130 MM HG: CPT | Performed by: NURSE PRACTITIONER

## 2024-05-30 PROCEDURE — 1157F ADVNC CARE PLAN IN RCRD: CPT | Performed by: NURSE PRACTITIONER

## 2024-05-30 ASSESSMENT — ENCOUNTER SYMPTOMS
CONSTITUTIONAL NEGATIVE: 1
CARDIOVASCULAR NEGATIVE: 1
MUSCULOSKELETAL NEGATIVE: 1
NEUROLOGICAL NEGATIVE: 1
GASTROINTESTINAL NEGATIVE: 1
RESPIRATORY NEGATIVE: 1

## 2024-05-30 ASSESSMENT — PATIENT HEALTH QUESTIONNAIRE - PHQ9
2. FEELING DOWN, DEPRESSED OR HOPELESS: NOT AT ALL
1. LITTLE INTEREST OR PLEASURE IN DOING THINGS: NOT AT ALL
SUM OF ALL RESPONSES TO PHQ9 QUESTIONS 1 AND 2: 0

## 2024-05-30 NOTE — PROGRESS NOTES
"Chief Complaint:   Follow-up    History Of Present Illness:    .Ms Elizabeth returns in follow up.  She is doing well s/p back surgery.  Denies chest pain, sob, palpitations or pedal edema.           Last Recorded Vitals:  Blood pressure 108/73, pulse 74, resp. rate 16, height 1.6 m (5' 3\"), weight 102 kg (224 lb), SpO2 95%.     Past Medical History:  Past Medical History:   Diagnosis Date    Abnormal levels of other serum enzymes 12/01/2020    Abnormal AST and ALT    Anemia, unspecified 12/28/2018    Postoperative anemia    Calculus of gallbladder without cholecystitis without obstruction 06/26/2019    Gallstones    Cardiomyopathy (Multi)     s/p ICD, last seen by Dr. Maravilla on 05/14/2023    Cataract     Chronic maxillary sinusitis 03/22/2018    Right maxillary sinusitis    COVID-19 10/02/2020    COVID-19 virus infection    Depression     Diabetes mellitus (Multi)     Dorsalgia, unspecified 10/15/2019    Mid-back pain, acute    Effusion, unspecified hand 10/14/2016    Swelling of hand joint    Essential (primary) hypertension 08/19/2021    Benign essential HTN    Gastroparesis 12/05/2019    Gastroparesis    History of falling 08/04/2021    Status post fall    Hyperlipidemia     Hypothyroidism     Localized edema 05/01/2018    Leg edema    Occipital neuralgia 02/10/2017    Occipital neuralgia of right side    Osteomyelitis, unspecified (Multi) 12/16/2019    Knee osteomyelits, right    Other amnesia 09/01/2020    Memory problem    Other intervertebral disc displacement, lumbar region 04/23/2020    Disc displacement, lumbar    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    Postherpetic neuralgia    Other specified disorders of kidney and ureter 05/30/2019    Left renal mass    Personal history of other diseases of the circulatory system 08/03/2021    History of carotid artery stenosis    Personal history of other diseases of the digestive system 06/26/2019    History of biliary colic    Personal history of " other infectious and parasitic diseases     History of scarlet fever    Personal history of other infectious and parasitic diseases 2017    History of herpes zoster    Personal history of other specified conditions 2015    History of right flank pain    Pneumonia, unspecified organism 2019    Left lower lobe pneumonia    Radiculopathy, cervical region 2020    Cervical radiculopathy    Radiculopathy, lumbar region 2020    Lumbar radiculitis    Repeated falls 2018    Recurrent falls    Sleep apnea     Spinal stenosis     cervical, scheduled for surgery 2023    Spondylosis without myelopathy or radiculopathy, cervical region 2017    Spondylosis of cervical region without myelopathy or radiculopathy    Spondylosis without myelopathy or radiculopathy, lumbar region 2020    Lumbar spondylosis    Stricture of artery (CMS-HCC) 2021    Subclavian artery stenosis, right    Trigger finger, right index finger 2021    Trigger index finger of right hand    Trigger finger, right ring finger 2021    Trigger ring finger of right hand    Unspecified asthma, uncomplicated ([facility]) 2020    Asthmatic bronchitis    Vision loss         Past Surgical History:  Past Surgical History:   Procedure Laterality Date    BACK SURGERY  10/12/2017    Back Surgery    BACK SURGERY  2017    Lower Back Surgery    BACK SURGERY  2023    NINA-C5,6,7    CARDIAC CATHETERIZATION       SECTION, CLASSIC  2013     Section    CHOLECYSTECTOMY      COLONOSCOPY      CT GUIDED PERCUTANEOUS BIOPSY MEDIASTINUM  2017    CT GUIDED PERCUTANEOUS BIOPSY MEDIASTINUM 2017 [facility]    DILATION AND CURETTAGE OF UTERUS  2013    Dilation And Curettage    INSERT / REPLACE / REMOVE PACEMAKER      KNEE ARTHROPLASTY Bilateral     OTHER SURGICAL HISTORY  2019    Knee arthroscopy    SPINE SURGERY      upper c-3 removed, bridge added, and fusion     TONSILLECTOMY  05/17/2013    Tonsillectomy    TUBAL LIGATION  05/17/2013    Tubal Ligation    UPPER GASTROINTESTINAL ENDOSCOPY         Social History:  Social History     Socioeconomic History    Marital status:      Spouse name: stefany    Number of children: None    Years of education: None    Highest education level: None   Occupational History    None   Tobacco Use    Smoking status: Never    Smokeless tobacco: Never   Vaping Use    Vaping status: Never Used   Substance and Sexual Activity    Alcohol use: Yes     Comment: OCCASIONAL    Drug use: Never    Sexual activity: Not Currently   Other Topics Concern    None   Social History Narrative    None     Social Determinants of Health     Financial Resource Strain: Low Risk  (11/14/2023)    Overall Financial Resource Strain (CARDIA)     Difficulty of Paying Living Expenses: Not hard at all   Food Insecurity: No Food Insecurity (11/14/2023)    Hunger Vital Sign     Worried About Running Out of Food in the Last Year: Never true     Ran Out of Food in the Last Year: Never true   Transportation Needs: No Transportation Needs (11/14/2023)    PRAPARE - Transportation     Lack of Transportation (Medical): No     Lack of Transportation (Non-Medical): No   Physical Activity: Not on file   Stress: No Stress Concern Present (11/14/2023)    [place] [employer] - Occupational Stress Questionnaire     Feeling of Stress : Not at all   Social Connections: Moderately Isolated (11/14/2023)    Social Connection and Isolation Panel [NHANES]     Frequency of Communication with Friends and Family: Never     Frequency of Social Gatherings with Friends and Family: Never     Attends [employer] Services: Never     Active Member of Clubs or Organizations: Yes     Attends Club or Organization Meetings: Never     Marital Status:    Intimate Partner Violence: Not At Risk (11/14/2023)    Humiliation, Afraid, Rape, and Kick questionnaire     Fear of Current or  Ex-Partner: No     Emotionally Abused: No     Physically Abused: No     Sexually Abused: No   Housing Stability: Low Risk  (11/14/2023)    Housing Stability Vital Sign     Unable to Pay for Housing in the Last Year: No     Number of Places Lived in the Last Year: 1     Unstable Housing in the Last Year: No       Family History:  Family History   Problem Relation Name Age of Onset    Heart disease Mother      Heart disease Father      Prostate cancer Father      No Known Problems Sister DENEEN     Heart disease Brother PENNY     Skin cancer Brother PENNY     Lung disease Brother PENNY     COPD Brother PENNY          Allergies:  Shellfish containing products, Codeine, Tramadol, and Celecoxib    Outpatient Medications:  Current Outpatient Medications   Medication Sig Dispense Refill    albuterol 90 mcg/actuation inhaler Inhale 1 puff every 4 hours if needed.      aspirin 81 mg capsule Take 1 tablet by mouth once daily. CHEW AND SWALLOW      atorvastatin (Lipitor) 40 mg tablet Take 1 tablet (40 mg) by mouth once daily. 90 tablet 1    biotin 10 mg tablet Take 1 tablet (10 mg) by mouth once daily.      blood sugar diagnostic ([employer]etr[employer] Test) strip TEST twice a day      calcium carbonate-vitamin D3 600 mg-20 mcg (800 unit) tablet Caltrate with Vitamin D3 600 mg (1,500 mg)-800 unit tablet      cholecalciferol (Vitamin D-3) 125 MCG (5000 UT) capsule Take 1 capsule (125 mcg) by mouth once daily.      coenzyme Q-10 200 mg capsule Take 1 capsule (200 mg) by mouth once daily.      cyanocobalamin (Vitamin B-12) 250 mcg tablet Take 1 tablet (250 mcg) by mouth once daily.      Entresto 24-26 mg tablet Take 1 tablet by mouth 2 times a day. 180 tablet 0    escitalopram (Lexapro) 20 mg tablet Take 1 tablet (20 mg) by mouth once daily. 90 tablet 1    ferrous sulfate 325 (65 Fe) MG tablet Take 1 tablet by mouth 2 times a day.      fexofenadine (Allegra) 180 mg tablet Take 1 tablet (180 mg) by mouth once daily.      fluticasone (Flonase) 50  mcg/actuation nasal spray Administer 1 spray into affected nostril(s) once daily.      folic acid (Folvite) 1 mg tablet Take 1 tablet (1 mg) by mouth once daily. @0900      furosemide (Lasix) 20 mg tablet Take 1 tablet (20 mg) by mouth once daily. 90 tablet 3    gabapentin (Neurontin) 300 mg capsule TAKE 1 CAPSULE BY MOUTH TWICE  DAILY 180 capsule 3    hydroxychloroquine (Plaquenil) 200 mg tablet TAKE 1 TABLET BY MOUTH TWICE  DAILY 180 tablet 1    Jardiance 10 mg Take 1 tablet (10 mg) by mouth once daily. 90 tablet 0    levothyroxine (Synthroid, Levoxyl) 100 mcg tablet Take 1 tablet (100 mcg) by mouth once daily. 90 tablet 3    magnesium oxide (Mag-Ox) 400 mg tablet Take 2 tablets (800 mg) by mouth once daily.      meloxicam (Mobic) 7.5 mg tablet TAKE 1 TABLET BY MOUTH ONCE  DAILY 90 tablet 1    metFORMIN (Glucophage) 1,000 mg tablet TAKE 1 TABLET BY MOUTH TWICE  DAILY WITH MEALS 180 tablet 3    methocarbamol (Robaxin) 500 mg tablet Take 1-2 tabs every 8 hours as needed for spasms 60 tablet 2    metoprolol succinate XL (Toprol-XL) 100 mg 24 hr tablet Take 1 tablet (100 mg) by mouth once daily. 90 tablet 3    multivit/folic acid/vit K1 (WOMEN'S 50 PLUS ADVANCED ORAL) Take 1 tablet by mouth once daily.      pantoprazole (ProtoNix) 40 mg EC tablet Take 1 tablet (40 mg) by mouth once daily. 90 tablet 3     No current facility-administered medications for this visit.        Physical Exam:  Cardiovascular:      PMI at left midclavicular line. Normal rate. Regular rhythm. Normal S1. Normal S2.       Murmurs: There is a grade 1/6 systolic murmur.      No gallop.  No click. No rub.   Pulses:     Intact distal pulses.   Edema:     Peripheral edema absent.         ROS:  Review of Systems   Constitutional: Negative.   Cardiovascular: Negative.    Respiratory: Negative.     Skin: Negative.    Musculoskeletal: Negative.    Gastrointestinal: Negative.    Genitourinary: Negative.    Neurological: Negative.           Last Labs:  CBC  -  Lab Results   Component Value Date    WBC 8.2 11/06/2023    HGB 13.8 11/06/2023    HCT 42.7 11/06/2023    MCV 94 11/06/2023     11/06/2023       CMP -  Lab Results   Component Value Date    CALCIUM 9.7 11/06/2023    PHOS 4.1 06/26/2019    PROT 7.5 10/10/2023    ALBUMIN 4.6 10/10/2023    AST 16 10/10/2023    ALT 21 10/10/2023    ALKPHOS 57 10/10/2023    BILITOT 0.7 10/10/2023       LIPID PANEL -   Lab Results   Component Value Date    CHOL 169 10/10/2023    TRIG 299 (H) 10/10/2023    HDL 54.1 10/10/2023    CHHDL 3.1 10/10/2023    LDLF 46 01/26/2022    VLDL 60 (H) 10/10/2023    NHDL 115 10/10/2023       RENAL FUNCTION PANEL -   Lab Results   Component Value Date    GLUCOSE 131 (H) 11/06/2023     11/06/2023    K 4.1 11/06/2023     11/06/2023    CO2 24 11/06/2023    ANIONGAP 19 11/06/2023    BUN 23 11/06/2023    CREATININE 0.93 11/06/2023    CALCIUM 9.7 11/06/2023    PHOS 4.1 06/26/2019    ALBUMIN 4.6 10/10/2023        Lab Results   Component Value Date    HGBA1C 9.1 (A) 05/14/2024         Assessment/Plan   Problem List Items Addressed This Visit    None    1. Low-grade nonobstructive coronary artery disease.     2. Nonischemic congestive cardiomyopathy. This patient was seen by her primary care physician in 2021 and had an abnormal EKG performed. Based on this she was scheduled to have a echocardiogram on 8/6/2021 which showed a moderately severe reduction in the patient's LV ejection fraction at 30% with 2+ moderate mitral valve regurgitation. Patient was subsequently seen by cardiology and underwent cardiac catheterization on 8/30/2021 which demonstrated low-grade coronary artery disease. As such the patient was diagnosed as having a nonischemic congestive cardiomyopathy. Of note she had had an echocardiogram performed in 2013 showing a preserved LV ejection fraction. Clinically the patient is doing well. She did have a single lead ICD implanted 5/11/2022 prophylactically which has not  discharged. She did have a repeat echocardiogram performed on 3/28/2022 again demonstrating an LV ejection fraction of only 25-30% with moderate to severe left ventricular chamber enlargement. Clinically patient is doing well without any significant shortness of breath. Blood pressure is in the lower range of normal on the current entresto 20/26 mg twice daily and the metoprolol ER 50 mg daily. Patient will be started on Jardiance 10 mg daily and the dose of metoprolol ER will be increased from 50 to 100 mg daily to improve beta-blockade. She will return in 6 months for routine follow-up. Her baseline EKG was shows sinus rhythm with a left anterior hemiblock.  Echo 01/2023  CONCLUSIONS:   1. Left ventricular systolic function is normal with a 55-60% estimated ejection fraction.   2. Spectral Doppler shows an impaired relaxation pattern of left ventricular diastolic filling.   3. Mild mitral valve regurgitation.   4. The estimated LV ejection fraction has improved from 25-30% to 55-60% since the study of 3/28/2022.     3. Status post prophylactic single-lead ICD implantation 5/11/2022. Continue follow-up in device clinic.     4. Hypertension. Adequate control on current therapy but will increase the dose of Toprol-XL as therapy for her nonischemic cardiomyopathy.     5. Type 2 diabetes. The patient's home glucose readings have typically been been between 190-240. As noted above she will be started on Jardiance 10 mg daily to supplement her current therapy     6. Obesity.     7. Hypothyroidism.     8. Status post bilateral total knee replacements.     9. Status post multiple (4) lumbosacral spine surgeries from T9-L5.     10. Status post bilateral tubal ligation.          Madie Augustine, APRN-CNP

## 2024-06-28 DIAGNOSIS — E11.8 CONTROLLED TYPE 2 DIABETES MELLITUS WITH COMPLICATION, WITHOUT LONG-TERM CURRENT USE OF INSULIN (MULTI): ICD-10-CM

## 2024-06-28 DIAGNOSIS — E78.5 HYPERLIPIDEMIA, UNSPECIFIED HYPERLIPIDEMIA TYPE: ICD-10-CM

## 2024-07-01 RX ORDER — EMPAGLIFLOZIN 10 MG/1
10 TABLET, FILM COATED ORAL DAILY
Qty: 90 TABLET | Refills: 3 | Status: SHIPPED | OUTPATIENT
Start: 2024-07-01

## 2024-07-06 RX ORDER — ATORVASTATIN CALCIUM 40 MG/1
40 TABLET, FILM COATED ORAL DAILY
Qty: 90 TABLET | Refills: 3 | Status: SHIPPED | OUTPATIENT
Start: 2024-07-06

## 2024-07-30 ENCOUNTER — HOSPITAL ENCOUNTER (OUTPATIENT)
Dept: CARDIOLOGY | Facility: CLINIC | Age: 74
Discharge: HOME | End: 2024-07-30
Payer: MEDICARE

## 2024-07-30 ENCOUNTER — TELEPHONE (OUTPATIENT)
Dept: RHEUMATOLOGY | Facility: CLINIC | Age: 74
End: 2024-07-30
Payer: MEDICARE

## 2024-07-30 DIAGNOSIS — Z95.810 PRESENCE OF AUTOMATIC CARDIOVERTER/DEFIBRILLATOR (AICD): ICD-10-CM

## 2024-07-30 DIAGNOSIS — M43.10 ACQUIRED SPONDYLOLISTHESIS: ICD-10-CM

## 2024-07-30 DIAGNOSIS — M47.12 CERVICAL ARTHRITIS WITH MYELOPATHY: ICD-10-CM

## 2024-07-30 DIAGNOSIS — I49.01 VF (VENTRICULAR FIBRILLATION) (MULTI): ICD-10-CM

## 2024-07-30 PROCEDURE — 93296 REM INTERROG EVL PM/IDS: CPT

## 2024-07-30 NOTE — TELEPHONE ENCOUNTER
Per Danette she has bleeding behind her eye and they recommend she stop Hydroxychloroquine.  Can you please advise if I can just stop or do I need to taper?

## 2024-08-13 DIAGNOSIS — M43.10 ACQUIRED SPONDYLOLISTHESIS: ICD-10-CM

## 2024-08-13 DIAGNOSIS — M47.12 CERVICAL ARTHRITIS WITH MYELOPATHY: ICD-10-CM

## 2024-08-14 RX ORDER — HYDROXYCHLOROQUINE SULFATE 200 MG/1
TABLET, FILM COATED ORAL 2 TIMES DAILY
Qty: 180 TABLET | Refills: 1 | Status: SHIPPED | OUTPATIENT
Start: 2024-08-14

## 2024-08-15 DIAGNOSIS — F32.A DEPRESSION, UNSPECIFIED DEPRESSION TYPE: ICD-10-CM

## 2024-08-15 RX ORDER — ESCITALOPRAM OXALATE 20 MG/1
20 TABLET ORAL DAILY
Qty: 90 TABLET | Refills: 3 | Status: SHIPPED | OUTPATIENT
Start: 2024-08-15

## 2024-08-19 DIAGNOSIS — E78.5 HYPERLIPIDEMIA, UNSPECIFIED HYPERLIPIDEMIA TYPE: ICD-10-CM

## 2024-08-19 DIAGNOSIS — I15.9 SECONDARY HYPERTENSION: ICD-10-CM

## 2024-08-20 RX ORDER — SACUBITRIL AND VALSARTAN 24; 26 MG/1; MG/1
1 TABLET, FILM COATED ORAL 2 TIMES DAILY
Qty: 180 TABLET | Refills: 3 | Status: SHIPPED | OUTPATIENT
Start: 2024-08-20

## 2024-08-22 ENCOUNTER — APPOINTMENT (OUTPATIENT)
Dept: PRIMARY CARE | Facility: CLINIC | Age: 74
End: 2024-08-22
Payer: MEDICARE

## 2024-08-23 ENCOUNTER — CLINICAL SUPPORT (OUTPATIENT)
Dept: PRIMARY CARE | Facility: CLINIC | Age: 74
End: 2024-08-23
Payer: MEDICARE

## 2024-08-23 DIAGNOSIS — E11.8 CONTROLLED TYPE 2 DIABETES MELLITUS WITH COMPLICATION, WITHOUT LONG-TERM CURRENT USE OF INSULIN (MULTI): ICD-10-CM

## 2024-08-23 DIAGNOSIS — E03.9 ACQUIRED HYPOTHYROIDISM: ICD-10-CM

## 2024-08-23 DIAGNOSIS — E78.5 HYPERLIPIDEMIA, UNSPECIFIED HYPERLIPIDEMIA TYPE: ICD-10-CM

## 2024-08-23 DIAGNOSIS — E78.2 MIXED HYPERLIPIDEMIA: ICD-10-CM

## 2024-08-23 LAB
ALBUMIN SERPL BCP-MCNC: 4.1 G/DL (ref 3.4–5)
ALP SERPL-CCNC: 49 U/L (ref 33–136)
ALT SERPL W P-5'-P-CCNC: 14 U/L (ref 7–45)
ANION GAP SERPL CALC-SCNC: 16 MMOL/L (ref 10–20)
AST SERPL W P-5'-P-CCNC: 15 U/L (ref 9–39)
BILIRUB SERPL-MCNC: 0.5 MG/DL (ref 0–1.2)
BUN SERPL-MCNC: 30 MG/DL (ref 6–23)
CALCIUM SERPL-MCNC: 9.5 MG/DL (ref 8.6–10.3)
CHLORIDE SERPL-SCNC: 100 MMOL/L (ref 98–107)
CHOLEST SERPL-MCNC: 163 MG/DL (ref 0–199)
CHOLESTEROL/HDL RATIO: 4.6
CO2 SERPL-SCNC: 26 MMOL/L (ref 21–32)
CREAT SERPL-MCNC: 0.93 MG/DL (ref 0.5–1.05)
EGFRCR SERPLBLD CKD-EPI 2021: 65 ML/MIN/1.73M*2
GLUCOSE SERPL-MCNC: 186 MG/DL (ref 74–99)
HDLC SERPL-MCNC: 35.8 MG/DL
LDLC SERPL CALC-MCNC: ABNORMAL MG/DL
NON HDL CHOLESTEROL: 127 MG/DL (ref 0–149)
POTASSIUM SERPL-SCNC: 4.3 MMOL/L (ref 3.5–5.3)
PROT SERPL-MCNC: 6.5 G/DL (ref 6.4–8.2)
SODIUM SERPL-SCNC: 138 MMOL/L (ref 136–145)
TRIGL SERPL-MCNC: 479 MG/DL (ref 0–149)
TSH SERPL-ACNC: 3.94 MIU/L (ref 0.44–3.98)
VLDL: ABNORMAL

## 2024-08-23 PROCEDURE — 83036 HEMOGLOBIN GLYCOSYLATED A1C: CPT

## 2024-08-23 PROCEDURE — 80061 LIPID PANEL: CPT

## 2024-08-23 PROCEDURE — 84443 ASSAY THYROID STIM HORMONE: CPT

## 2024-08-23 PROCEDURE — 80053 COMPREHEN METABOLIC PANEL: CPT

## 2024-08-23 NOTE — PROGRESS NOTES
Joana came into clinic today to have her labs drawn. Verified name and . ABN signed. Tolerated lab draw and left without incident.

## 2024-08-24 LAB
EST. AVERAGE GLUCOSE BLD GHB EST-MCNC: 200 MG/DL
HBA1C MFR BLD: 8.6 %

## 2024-08-27 ENCOUNTER — APPOINTMENT (OUTPATIENT)
Dept: PRIMARY CARE | Facility: CLINIC | Age: 74
End: 2024-08-27
Payer: MEDICARE

## 2024-08-27 VITALS
HEIGHT: 63 IN | SYSTOLIC BLOOD PRESSURE: 140 MMHG | OXYGEN SATURATION: 98 % | HEART RATE: 88 BPM | WEIGHT: 218 LBS | TEMPERATURE: 97.8 F | BODY MASS INDEX: 38.62 KG/M2 | DIASTOLIC BLOOD PRESSURE: 82 MMHG

## 2024-08-27 DIAGNOSIS — Z00.00 ROUTINE GENERAL MEDICAL EXAMINATION AT HEALTH CARE FACILITY: Primary | ICD-10-CM

## 2024-08-27 DIAGNOSIS — Z12.31 ENCOUNTER FOR SCREENING MAMMOGRAM FOR MALIGNANT NEOPLASM OF BREAST: ICD-10-CM

## 2024-08-27 PROCEDURE — 1123F ACP DISCUSS/DSCN MKR DOCD: CPT | Performed by: FAMILY MEDICINE

## 2024-08-27 PROCEDURE — 3077F SYST BP >= 140 MM HG: CPT | Performed by: FAMILY MEDICINE

## 2024-08-27 PROCEDURE — 1036F TOBACCO NON-USER: CPT | Performed by: FAMILY MEDICINE

## 2024-08-27 PROCEDURE — 3008F BODY MASS INDEX DOCD: CPT | Performed by: FAMILY MEDICINE

## 2024-08-27 PROCEDURE — 3052F HG A1C>EQUAL 8.0%<EQUAL 9.0%: CPT | Performed by: FAMILY MEDICINE

## 2024-08-27 PROCEDURE — 1158F ADVNC CARE PLAN TLK DOCD: CPT | Performed by: FAMILY MEDICINE

## 2024-08-27 PROCEDURE — G0439 PPPS, SUBSEQ VISIT: HCPCS | Performed by: FAMILY MEDICINE

## 2024-08-27 PROCEDURE — 1159F MED LIST DOCD IN RCRD: CPT | Performed by: FAMILY MEDICINE

## 2024-08-27 PROCEDURE — 1170F FXNL STATUS ASSESSED: CPT | Performed by: FAMILY MEDICINE

## 2024-08-27 PROCEDURE — 1157F ADVNC CARE PLAN IN RCRD: CPT | Performed by: FAMILY MEDICINE

## 2024-08-27 PROCEDURE — 3079F DIAST BP 80-89 MM HG: CPT | Performed by: FAMILY MEDICINE

## 2024-08-27 ASSESSMENT — ENCOUNTER SYMPTOMS
EYE ITCHING: 0
HEADACHES: 0
JOINT SWELLING: 0
CONSTIPATION: 0
ABDOMINAL PAIN: 0
HEMATURIA: 0
NERVOUS/ANXIOUS: 0
RHINORRHEA: 0
LOSS OF SENSATION IN FEET: 0
APPETITE CHANGE: 0
SLEEP DISTURBANCE: 0
DEPRESSION: 0
MYALGIAS: 0
ACTIVITY CHANGE: 0
DYSURIA: 0
DIARRHEA: 0
LIGHT-HEADEDNESS: 0
VOMITING: 0
DIZZINESS: 0
PALPITATIONS: 1
SINUS PRESSURE: 0
FEVER: 0
DYSPHORIC MOOD: 0
WHEEZING: 0
NAUSEA: 0
COUGH: 0
OCCASIONAL FEELINGS OF UNSTEADINESS: 1
SORE THROAT: 0
NUMBNESS: 0
ARTHRALGIAS: 1
FLANK PAIN: 0
UNEXPECTED WEIGHT CHANGE: 0
EYE DISCHARGE: 0
WEAKNESS: 0
SHORTNESS OF BREATH: 0
BLOOD IN STOOL: 0
BACK PAIN: 1

## 2024-08-27 ASSESSMENT — VISUAL ACUITY
OD_CC: 20/25
OS_CC: 20/15

## 2024-08-27 ASSESSMENT — ACTIVITIES OF DAILY LIVING (ADL)
DOING_HOUSEWORK: INDEPENDENT
MANAGING_FINANCES: NEEDS ASSISTANCE
DRESSING: INDEPENDENT
TAKING_MEDICATION: INDEPENDENT
BATHING: INDEPENDENT
GROCERY_SHOPPING: INDEPENDENT

## 2024-08-27 ASSESSMENT — PATIENT HEALTH QUESTIONNAIRE - PHQ9
SUM OF ALL RESPONSES TO PHQ9 QUESTIONS 1 AND 2: 0
2. FEELING DOWN, DEPRESSED OR HOPELESS: NOT AT ALL
1. LITTLE INTEREST OR PLEASURE IN DOING THINGS: NOT AT ALL

## 2024-08-27 NOTE — PROGRESS NOTES
"Subjective   Reason for Visit: Joana Elizabeth is an 74 y.o. female here for a Medicare Wellness visit.     Past Medical, Surgical, and Family History reviewed and updated in chart.    Reviewed all medications by prescribing practitioner or clinical pharmacist (such as prescriptions, OTCs, herbal therapies and supplements) and documented in the medical record.    HPI COVID CARDIOMYOPATHY AND FOLLOWS WITH DR MCDONALD   MODERATE MUSCULOSKELETAL ISSUES     Patient Care Team:  January Garcia DO as PCP - General (Family Medicine)     Review of Systems   Constitutional:  Negative for activity change, appetite change, fever and unexpected weight change.   HENT:  Negative for congestion, ear pain, postnasal drip, rhinorrhea, sinus pressure and sore throat.    Eyes:  Negative for discharge, itching and visual disturbance.   Respiratory:  Negative for cough, shortness of breath and wheezing.    Cardiovascular:  Positive for palpitations. Negative for chest pain and leg swelling.        V FIB AND NOW PACER DEFIBRILLATOR    Gastrointestinal:  Negative for abdominal pain, blood in stool, constipation, diarrhea, nausea and vomiting.   Endocrine: Negative for cold intolerance, heat intolerance and polyuria.   Genitourinary:  Negative for dysuria, flank pain and hematuria.   Musculoskeletal:  Positive for arthralgias and back pain. Negative for gait problem, joint swelling and myalgias.   Skin:  Negative for rash.   Allergic/Immunologic: Negative for environmental allergies and food allergies.   Neurological:  Negative for dizziness, syncope, weakness, light-headedness, numbness and headaches.   Psychiatric/Behavioral:  Negative for dysphoric mood and sleep disturbance. The patient is not nervous/anxious.        Objective   Vitals:  /82   Pulse 88   Temp 36.6 °C (97.8 °F)   Ht 1.6 m (5' 3\")   Wt 98.9 kg (218 lb)   SpO2 98%   BMI 38.62 kg/m²       Physical Exam  Vitals and nursing note reviewed.   Constitutional:       " Appearance: Normal appearance.   HENT:      Head: Normocephalic.   Cardiovascular:      Rate and Rhythm: Normal rate and regular rhythm.      Pulses: Normal pulses.      Heart sounds: Normal heart sounds. No murmur heard.     No friction rub. No gallop.   Pulmonary:      Effort: Pulmonary effort is normal. No respiratory distress.      Breath sounds: Normal breath sounds. No wheezing.   Abdominal:      General: There is no distension.      Palpations: Abdomen is soft.      Tenderness: There is no abdominal tenderness.   Musculoskeletal:         General: No deformity. Normal range of motion.   Skin:     General: Skin is warm and dry.      Capillary Refill: Capillary refill takes less than 2 seconds.   Neurological:      General: No focal deficit present.      Mental Status: She is alert and oriented to person, place, and time.   Psychiatric:         Mood and Affect: Mood normal.         Assessment/Plan   Problem List Items Addressed This Visit             ICD-10-CM    Routine general medical examination at health care facility - Primary Z00.00    Encounter for screening mammogram for malignant neoplasm of breast Z12.31    Relevant Orders    BI mammo bilateral screening tomosynthesis

## 2024-10-13 DIAGNOSIS — E03.9 HYPOTHYROIDISM, UNSPECIFIED TYPE: ICD-10-CM

## 2024-10-13 DIAGNOSIS — K21.9 GERD WITHOUT ESOPHAGITIS: ICD-10-CM

## 2024-10-13 DIAGNOSIS — I15.9 SECONDARY HYPERTENSION: ICD-10-CM

## 2024-10-14 DIAGNOSIS — M48.02 CERVICAL STENOSIS OF SPINE: ICD-10-CM

## 2024-10-14 RX ORDER — MELOXICAM 7.5 MG/1
7.5 TABLET ORAL DAILY
Qty: 90 TABLET | Refills: 3 | Status: SHIPPED | OUTPATIENT
Start: 2024-10-14

## 2024-10-14 RX ORDER — PANTOPRAZOLE SODIUM 40 MG/1
40 TABLET, DELAYED RELEASE ORAL DAILY
Qty: 90 TABLET | Refills: 3 | Status: SHIPPED | OUTPATIENT
Start: 2024-10-14

## 2024-10-14 RX ORDER — METOPROLOL SUCCINATE 100 MG/1
100 TABLET, EXTENDED RELEASE ORAL DAILY
Qty: 90 TABLET | Refills: 3 | Status: SHIPPED | OUTPATIENT
Start: 2024-10-14

## 2024-10-14 RX ORDER — LEVOTHYROXINE SODIUM 100 UG/1
100 TABLET ORAL DAILY
Qty: 90 TABLET | Refills: 3 | Status: SHIPPED | OUTPATIENT
Start: 2024-10-14

## 2024-10-21 ENCOUNTER — HOSPITAL ENCOUNTER (OUTPATIENT)
Dept: CARDIOLOGY | Facility: CLINIC | Age: 74
Discharge: HOME | End: 2024-10-21
Payer: MEDICARE

## 2024-10-21 DIAGNOSIS — I49.01 VF (VENTRICULAR FIBRILLATION) (MULTI): ICD-10-CM

## 2024-10-21 DIAGNOSIS — Z95.810 PRESENCE OF AUTOMATIC CARDIOVERTER/DEFIBRILLATOR (AICD): ICD-10-CM

## 2024-11-02 DIAGNOSIS — B33.24 VIRAL CARDIOMYOPATHY (MULTI): ICD-10-CM

## 2024-11-05 RX ORDER — FUROSEMIDE 20 MG/1
20 TABLET ORAL DAILY
Qty: 90 TABLET | Refills: 3 | Status: SHIPPED | OUTPATIENT
Start: 2024-11-05

## 2024-11-13 ENCOUNTER — APPOINTMENT (OUTPATIENT)
Dept: CARDIOLOGY | Facility: CLINIC | Age: 74
End: 2024-11-13
Payer: MEDICARE

## 2024-11-13 DIAGNOSIS — Z95.810 PRESENCE OF AUTOMATIC CARDIOVERTER/DEFIBRILLATOR (AICD): ICD-10-CM

## 2024-11-13 DIAGNOSIS — I49.01 VF (VENTRICULAR FIBRILLATION) (MULTI): ICD-10-CM

## 2024-11-13 PROCEDURE — 93282 PRGRMG EVAL IMPLANTABLE DFB: CPT | Performed by: INTERNAL MEDICINE

## 2024-12-03 ENCOUNTER — APPOINTMENT (OUTPATIENT)
Dept: PRIMARY CARE | Facility: CLINIC | Age: 74
End: 2024-12-03
Payer: MEDICARE

## 2024-12-03 VITALS
OXYGEN SATURATION: 98 % | SYSTOLIC BLOOD PRESSURE: 112 MMHG | HEART RATE: 62 BPM | BODY MASS INDEX: 39.33 KG/M2 | WEIGHT: 222 LBS | DIASTOLIC BLOOD PRESSURE: 60 MMHG | TEMPERATURE: 96.6 F

## 2024-12-03 DIAGNOSIS — I15.9 SECONDARY HYPERTENSION: ICD-10-CM

## 2024-12-03 DIAGNOSIS — E11.9 TYPE 2 DIABETES MELLITUS WITHOUT COMPLICATION, WITHOUT LONG-TERM CURRENT USE OF INSULIN (MULTI): Primary | ICD-10-CM

## 2024-12-03 DIAGNOSIS — M54.6 THORACIC SPINE PAIN: ICD-10-CM

## 2024-12-03 DIAGNOSIS — I42.9 CARDIOMYOPATHY, UNSPECIFIED TYPE (MULTI): ICD-10-CM

## 2024-12-03 LAB — POC HEMOGLOBIN A1C: 8.2 % (ref 4.2–6.5)

## 2024-12-03 PROCEDURE — 1157F ADVNC CARE PLAN IN RCRD: CPT | Performed by: FAMILY MEDICINE

## 2024-12-03 PROCEDURE — 1159F MED LIST DOCD IN RCRD: CPT | Performed by: FAMILY MEDICINE

## 2024-12-03 PROCEDURE — 1036F TOBACCO NON-USER: CPT | Performed by: FAMILY MEDICINE

## 2024-12-03 PROCEDURE — 99214 OFFICE O/P EST MOD 30 MIN: CPT | Performed by: FAMILY MEDICINE

## 2024-12-03 PROCEDURE — 3052F HG A1C>EQUAL 8.0%<EQUAL 9.0%: CPT | Performed by: FAMILY MEDICINE

## 2024-12-03 PROCEDURE — 83036 HEMOGLOBIN GLYCOSYLATED A1C: CPT | Performed by: FAMILY MEDICINE

## 2024-12-03 PROCEDURE — 3078F DIAST BP <80 MM HG: CPT | Performed by: FAMILY MEDICINE

## 2024-12-03 PROCEDURE — 3074F SYST BP LT 130 MM HG: CPT | Performed by: FAMILY MEDICINE

## 2024-12-03 ASSESSMENT — ENCOUNTER SYMPTOMS
NAUSEA: 0
SORE THROAT: 0
SLEEP DISTURBANCE: 0
DYSPHORIC MOOD: 0
DYSURIA: 0
WEAKNESS: 0
NUMBNESS: 0
SHORTNESS OF BREATH: 0
EYE ITCHING: 0
APPETITE CHANGE: 0
DIZZINESS: 0
ARTHRALGIAS: 0
ABDOMINAL PAIN: 0
DIARRHEA: 0
BACK PAIN: 1
FEVER: 0
LIGHT-HEADEDNESS: 0
FLANK PAIN: 0
NERVOUS/ANXIOUS: 0
JOINT SWELLING: 0
WHEEZING: 0
RHINORRHEA: 0
SINUS PRESSURE: 0
EYE DISCHARGE: 0
MYALGIAS: 0
CONSTIPATION: 0
BLOOD IN STOOL: 0
COUGH: 0
ACTIVITY CHANGE: 0
HEMATURIA: 0
PALPITATIONS: 0
VOMITING: 0
HEADACHES: 0
UNEXPECTED WEIGHT CHANGE: 0

## 2024-12-03 NOTE — PROGRESS NOTES
Subjective   Patient ID: Joana Elizabeth is a 74 y.o. female who presents for Diabetes (Last A1C was completed on 8/23/24 resulting in 8.6.  Today in office 8.2.), DISCUSS IRON (TAKING CALCIUM, IRON, CALCIUM/VITAMIN D./TALKED WITH THE PHARMACIST WITH [facility].), and DISCUSS METFORMIN (CAN SHE TAKE BOTH TOGETHER IN THE MORNING?/ALSO WOULD LIKE TO DISCUSS WHEN SHE CAN TAKE ALL HER MEDS.  SHE WANTS TO TRY AND TAKE MOST IN THE MORNING.).    HPI LONG TERM DIABETIC CONTROL IS STABLE AND SLIGHTLY IMPROVED   VITALS  ARE GOOD   SHE STAYS ACTIVE AND ENGAGED       Review of Systems   Constitutional:  Negative for activity change, appetite change, fever and unexpected weight change.   HENT:  Negative for congestion, ear pain, postnasal drip, rhinorrhea, sinus pressure and sore throat.    Eyes:  Negative for discharge, itching and visual disturbance.   Respiratory:  Negative for cough, shortness of breath and wheezing.    Cardiovascular:  Negative for chest pain, palpitations and leg swelling.   Gastrointestinal:  Negative for abdominal pain, blood in stool, constipation, diarrhea, nausea and vomiting.   Endocrine: Negative for cold intolerance, heat intolerance and polyuria.   Genitourinary:  Negative for dysuria, flank pain and hematuria.   Musculoskeletal:  Positive for back pain. Negative for arthralgias, gait problem, joint swelling and myalgias.        HISTORY OF SPINE SURGERY AND NOW PAIN IN MID BACK AND AROUND THE RIGHT FRONT OF THE RIB CAGE    Skin:  Negative for rash.   Allergic/Immunologic: Negative for environmental allergies and food allergies.   Neurological:  Negative for dizziness, syncope, weakness, light-headedness, numbness and headaches.   Psychiatric/Behavioral:  Negative for dysphoric mood and sleep disturbance. The patient is not nervous/anxious.        Objective   /60   Pulse 62   Temp 35.9 °C (96.6 °F)   Wt 101 kg (222 lb)   SpO2 98%   BMI 39.33 kg/m²     Physical Exam  Vitals and nursing note  reviewed.   Constitutional:       Appearance: Normal appearance.   HENT:      Head: Normocephalic.   Cardiovascular:      Rate and Rhythm: Normal rate and regular rhythm.      Pulses: Normal pulses.      Heart sounds: No murmur heard.     No friction rub. No gallop.   Pulmonary:      Effort: Pulmonary effort is normal. No respiratory distress.      Breath sounds: No wheezing.   Abdominal:      General: There is no distension.      Tenderness: There is no abdominal tenderness.   Musculoskeletal:         General: Tenderness present. No deformity.      Comments: SURGERY /ROBERT THORACIC TOP CERVICAL    Skin:     General: Skin is warm and dry.   Neurological:      General: No focal deficit present.      Mental Status: She is alert and oriented to person, place, and time.   Psychiatric:         Mood and Affect: Mood normal.       Assessment/Plan   Problem List Items Addressed This Visit             ICD-10-CM    Secondary hypertension I15.9    Type 2 diabetes mellitus - Primary E11.9    Relevant Orders    POCT glycosylated hemoglobin (Hb A1C) manually resulted (Completed)    Cardiomyopathy I42.9     Other Visit Diagnoses         Codes    Thoracic spine pain     M54.6    Relevant Orders    XR thoracic spine 2 views

## 2024-12-03 NOTE — PATIENT INSTRUCTIONS
YOU ARE DOING WELL     A1C IN 3 MONTHS     MEDICARE WELLNESS DUE NEXT AUGUST  LABS AHEAD OF TIME CMP CBC, LIPID, MICROALBUMIN AND IRON AND TIBC AND A1C AND B12 AND VITAMIN D  COME FASTING   CONTINUE EFFORTS AT LOWER CARBS

## 2024-12-05 ENCOUNTER — APPOINTMENT (OUTPATIENT)
Dept: CARDIOLOGY | Facility: CLINIC | Age: 74
End: 2024-12-05
Payer: MEDICARE

## 2025-01-02 ENCOUNTER — OFFICE VISIT (OUTPATIENT)
Dept: CARDIOLOGY | Facility: CLINIC | Age: 75
End: 2025-01-02
Payer: MEDICARE

## 2025-01-02 VITALS
WEIGHT: 222 LBS | BODY MASS INDEX: 39.34 KG/M2 | HEIGHT: 63 IN | DIASTOLIC BLOOD PRESSURE: 75 MMHG | SYSTOLIC BLOOD PRESSURE: 113 MMHG | HEART RATE: 75 BPM | OXYGEN SATURATION: 97 %

## 2025-01-02 DIAGNOSIS — R94.31 ABNORMAL ELECTROCARDIOGRAM (ECG) (EKG): ICD-10-CM

## 2025-01-02 DIAGNOSIS — I50.9 CONGESTIVE HEART FAILURE, UNSPECIFIED HF CHRONICITY, UNSPECIFIED HEART FAILURE TYPE: Primary | ICD-10-CM

## 2025-01-02 PROCEDURE — 1159F MED LIST DOCD IN RCRD: CPT | Performed by: NURSE PRACTITIONER

## 2025-01-02 PROCEDURE — 99214 OFFICE O/P EST MOD 30 MIN: CPT | Performed by: NURSE PRACTITIONER

## 2025-01-02 PROCEDURE — 3008F BODY MASS INDEX DOCD: CPT | Performed by: NURSE PRACTITIONER

## 2025-01-02 PROCEDURE — 3078F DIAST BP <80 MM HG: CPT | Performed by: NURSE PRACTITIONER

## 2025-01-02 PROCEDURE — 1157F ADVNC CARE PLAN IN RCRD: CPT | Performed by: NURSE PRACTITIONER

## 2025-01-02 PROCEDURE — 1160F RVW MEDS BY RX/DR IN RCRD: CPT | Performed by: NURSE PRACTITIONER

## 2025-01-02 PROCEDURE — 3074F SYST BP LT 130 MM HG: CPT | Performed by: NURSE PRACTITIONER

## 2025-01-02 PROCEDURE — G2211 COMPLEX E/M VISIT ADD ON: HCPCS | Performed by: NURSE PRACTITIONER

## 2025-01-02 PROCEDURE — 1036F TOBACCO NON-USER: CPT | Performed by: NURSE PRACTITIONER

## 2025-01-02 ASSESSMENT — ENCOUNTER SYMPTOMS
MUSCULOSKELETAL NEGATIVE: 1
GASTROINTESTINAL NEGATIVE: 1
NEUROLOGICAL NEGATIVE: 1
RESPIRATORY NEGATIVE: 1
DEPRESSION: 0
CARDIOVASCULAR NEGATIVE: 1
CONSTITUTIONAL NEGATIVE: 1

## 2025-01-02 ASSESSMENT — PATIENT HEALTH QUESTIONNAIRE - PHQ9
SUM OF ALL RESPONSES TO PHQ9 QUESTIONS 1 AND 2: 0
1. LITTLE INTEREST OR PLEASURE IN DOING THINGS: NOT AT ALL
2. FEELING DOWN, DEPRESSED OR HOPELESS: NOT AT ALL

## 2025-01-02 NOTE — PROGRESS NOTES
"Chief Complaint:   Follow-up    History Of Present Illness:    .Ms Elizabeth returns in follow up.  She is doing well s/p back surgery.  Denies chest pain, sob, palpitations or pedal edema.              Last Recorded Vitals:  Blood pressure 113/75, pulse 75, height 1.6 m (5' 3\"), weight 101 kg (222 lb), SpO2 97%.     Past Medical History:  Past Medical History:   Diagnosis Date    Abnormal levels of other serum enzymes 12/01/2020    Abnormal AST and ALT    Anemia, unspecified 12/28/2018    Postoperative anemia    Calculus of gallbladder without cholecystitis without obstruction 06/26/2019    Gallstones    Cardiomyopathy     s/p ICD, last seen by Dr. Maravilla on 05/14/2023    Cataract     Chronic maxillary sinusitis 03/22/2018    Right maxillary sinusitis    COVID-19 10/02/2020    COVID-19 virus infection    Depression     Diabetes mellitus (Multi)     Dorsalgia, unspecified 10/15/2019    Mid-back pain, acute    Effusion, unspecified hand 10/14/2016    Swelling of hand joint    Essential (primary) hypertension 08/19/2021    Benign essential HTN    Gastroparesis 12/05/2019    Gastroparesis    History of falling 08/04/2021    Status post fall    Hyperlipidemia     Hypothyroidism     Localized edema 05/01/2018    Leg edema    Occipital neuralgia 02/10/2017    Occipital neuralgia of right side    Osteomyelitis, unspecified 12/16/2019    Knee osteomyelits, right    Other amnesia 09/01/2020    Memory problem    Other intervertebral disc displacement, lumbar region 04/23/2020    Disc displacement, lumbar    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    Postherpetic neuralgia    Other specified disorders of kidney and ureter 05/30/2019    Left renal mass    Personal history of other diseases of the circulatory system 08/03/2021    History of carotid artery stenosis    Personal history of other diseases of the digestive system 06/26/2019    History of biliary colic    Personal history of other infectious and " parasitic diseases     History of scarlet fever    Personal history of other infectious and parasitic diseases 2017    History of herpes zoster    Personal history of other specified conditions 2015    History of right flank pain    Pneumonia, unspecified organism 2019    Left lower lobe pneumonia    Radiculopathy, cervical region 2020    Cervical radiculopathy    Radiculopathy, lumbar region 2020    Lumbar radiculitis    Repeated falls 2018    Recurrent falls    Sleep apnea     Spinal stenosis     cervical, scheduled for surgery 2023    Spondylosis without myelopathy or radiculopathy, cervical region 2017    Spondylosis of cervical region without myelopathy or radiculopathy    Spondylosis without myelopathy or radiculopathy, lumbar region 2020    Lumbar spondylosis    Stricture of artery (CMS-HCC) 2021    Subclavian artery stenosis, right    Trigger finger, right index finger 2021    Trigger index finger of right hand    Trigger finger, right ring finger 2021    Trigger ring finger of right hand    Unspecified asthma, uncomplicated ([facility]) 2020    Asthmatic bronchitis    Vision loss         Past Surgical History:  Past Surgical History:   Procedure Laterality Date    BACK SURGERY  10/12/2017    Back Surgery    BACK SURGERY  2017    Lower Back Surgery    BACK SURGERY  2023    NINA-C5,6,7    CARDIAC CATHETERIZATION       SECTION, CLASSIC  2013     Section    CHOLECYSTECTOMY      COLONOSCOPY      CT GUIDED PERCUTANEOUS BIOPSY MEDIASTINUM  2017    CT GUIDED PERCUTANEOUS BIOPSY MEDIASTINUM 2017 [facility]    DILATION AND CURETTAGE OF UTERUS  2013    Dilation And Curettage    INSERT / REPLACE / REMOVE PACEMAKER      KNEE ARTHROPLASTY Bilateral     OTHER SURGICAL HISTORY  2019    Knee arthroscopy    SPINE SURGERY      upper c-3 removed, bridge added, and fusion    TONSILLECTOMY   05/17/2013    Tonsillectomy    TUBAL LIGATION  05/17/2013    Tubal Ligation    UPPER GASTROINTESTINAL ENDOSCOPY         Social History:  Social History     Socioeconomic History    Marital status:      Spouse name: stefany   Tobacco Use    Smoking status: Never    Smokeless tobacco: Never   Vaping Use    Vaping status: Never Used   Substance and Sexual Activity    Alcohol use: Yes     Comment: OCCASIONAL    Drug use: Never    Sexual activity: Not Currently     Social Drivers of Health     Financial Resource Strain: Low Risk  (11/14/2023)    Overall Financial Resource Strain (CARDIA)     Difficulty of Paying Living Expenses: Not hard at all   Food Insecurity: No Food Insecurity (11/14/2023)    Hunger Vital Sign     Worried About Running Out of Food in the Last Year: Never true     Ran Out of Food in the Last Year: Never true   Transportation Needs: No Transportation Needs (11/14/2023)    PRAPARE - Transportation     Lack of Transportation (Medical): No     Lack of Transportation (Non-Medical): No   Stress: No Stress Concern Present (11/14/2023)    [employer] - Occupational Stress Questionnaire     Feeling of Stress : Not at all   Social Connections: Moderately Isolated (11/14/2023)    Social Connection and Isolation Panel [NHANES]     Frequency of Communication with Friends and Family: Never     Frequency of Social Gatherings with Friends and Family: Never     Attends Bahai Services: Never     Active Member of Clubs or Organizations: Yes     Attends Club or Organization Meetings: Never     Marital Status:    Intimate Partner Violence: Not At Risk (11/14/2023)    Humiliation, Afraid, Rape, and Kick questionnaire     Fear of Current or Ex-Partner: No     Emotionally Abused: No     Physically Abused: No     Sexually Abused: No   Housing Stability: Low Risk  (11/14/2023)    Housing Stability Vital Sign     Unable to Pay for Housing in the Last Year: No     Number of Places  Lived in the Last Year: 1     Unstable Housing in the Last Year: No       Family History:  Family History   Problem Relation Name Age of Onset    Heart disease Mother      Heart disease Father      Prostate cancer Father      No Known Problems Sister DENEEN     Heart disease Brother PENNY     Skin cancer Brother PENNY     Lung disease Brother PENNY     COPD Brother PENNY          Allergies:  Shellfish containing products, Codeine, Tramadol, and Celecoxib    Outpatient Medications:  Current Outpatient Medications   Medication Sig Dispense Refill    albuterol 90 mcg/actuation inhaler Inhale 1 puff every 4 hours if needed.      aspirin 81 mg capsule Take 1 tablet by mouth once daily. CHEW AND SWALLOW      atorvastatin (Lipitor) 40 mg tablet TAKE 1 TABLET BY MOUTH ONCE  DAILY 90 tablet 3    biotin 10 mg tablet Take 1 tablet (10 mg) by mouth once daily.      blood sugar diagnostic ([employer]etr[employer] Test) strip TEST twice a day      calcium carbonate-vitamin D3 600 mg-20 mcg (800 unit) tablet Caltrate with Vitamin D3 600 mg (1,500 mg)-800 unit tablet      cholecalciferol (Vitamin D-3) 125 MCG (5000 UT) capsule Take 1 capsule (125 mcg) by mouth once daily.      coenzyme Q-10 200 mg capsule Take 1 capsule (200 mg) by mouth once daily.      cyanocobalamin (Vitamin B-12) 250 mcg tablet Take 1 tablet (250 mcg) by mouth once daily.      Entresto 24-26 mg tablet TAKE 1 TABLET BY MOUTH TWICE  DAILY 180 tablet 3    escitalopram (Lexapro) 20 mg tablet TAKE 1 TABLET BY MOUTH ONCE  DAILY 90 tablet 3    ferrous sulfate 325 (65 Fe) MG tablet Take 1 tablet (325 mg) by mouth 2 times a day.      fexofenadine (Allegra) 180 mg tablet Take 1 tablet (180 mg) by mouth once daily.      fluticasone (Flonase) 50 mcg/actuation nasal spray Administer 1 spray into affected nostril(s) once daily.      folic acid (Folvite) 1 mg tablet Take 1 tablet (1 mg) by mouth once daily. @0900      furosemide (Lasix) 20 mg tablet TAKE 1 TABLET BY MOUTH ONCE  DAILY 90  tablet 3    gabapentin (Neurontin) 300 mg capsule TAKE 1 CAPSULE BY MOUTH TWICE  DAILY 180 capsule 3    hydroxychloroquine (Plaquenil) 200 mg tablet TAKE 1 TABLET BY MOUTH TWICE  DAILY 180 tablet 1    Jardiance 10 mg TAKE 1 TABLET BY MOUTH DAILY 90 tablet 3    levothyroxine (Synthroid, Levoxyl) 100 mcg tablet TAKE 1 TABLET BY MOUTH ONCE  DAILY 90 tablet 3    magnesium oxide (Mag-Ox) 400 mg tablet Take 2 tablets (800 mg) by mouth once daily.      meloxicam (Mobic) 7.5 mg tablet TAKE 1 TABLET BY MOUTH ONCE  DAILY 90 tablet 3    metFORMIN (Glucophage) 1,000 mg tablet TAKE 1 TABLET BY MOUTH TWICE  DAILY WITH MEALS 180 tablet 3    methocarbamol (Robaxin) 500 mg tablet Take 1-2 tabs every 8 hours as needed for spasms 60 tablet 2    metoprolol succinate XL (Toprol-XL) 100 mg 24 hr tablet TAKE 1 TABLET BY MOUTH ONCE  DAILY 90 tablet 3    multivit/folic acid/vit K1 (WOMEN'S 50 PLUS ADVANCED ORAL) Take 1 tablet by mouth once daily.      pantoprazole (ProtoNix) 40 mg EC tablet TAKE 1 TABLET BY MOUTH ONCE  DAILY 90 tablet 3     No current facility-administered medications for this visit.        Physical Exam:  Cardiovascular:      PMI at left midclavicular line. Normal rate. Regular rhythm. Normal S1. Normal S2.       Murmurs: There is a grade 1/6 systolic murmur.      No gallop.  No click. No rub.   Pulses:     Intact distal pulses.   Edema:     Peripheral edema absent.         ROS:  Review of Systems   Constitutional: Negative.   Cardiovascular: Negative.    Respiratory: Negative.     Skin: Negative.    Musculoskeletal: Negative.    Gastrointestinal: Negative.    Genitourinary: Negative.    Neurological: Negative.           Last Labs:  CBC -  Lab Results   Component Value Date    WBC 8.2 11/06/2023    HGB 13.8 11/06/2023    HCT 42.7 11/06/2023    MCV 94 11/06/2023     11/06/2023       CMP -  Lab Results   Component Value Date    CALCIUM 9.5 08/23/2024    PHOS 4.1 06/26/2019    PROT 6.5 08/23/2024    ALBUMIN 4.1  08/23/2024    AST 15 08/23/2024    ALT 14 08/23/2024    ALKPHOS 49 08/23/2024    BILITOT 0.5 08/23/2024       LIPID PANEL -   Lab Results   Component Value Date    CHOL 163 08/23/2024    TRIG 479 (H) 08/23/2024    HDL 35.8 08/23/2024    CHHDL 4.6 08/23/2024    LDLF 46 01/26/2022    VLDL  08/23/2024      Comment:      Unable to calculate VLDL.    NHDL 127 08/23/2024       RENAL FUNCTION PANEL -   Lab Results   Component Value Date    GLUCOSE 186 (H) 08/23/2024     08/23/2024    K 4.3 08/23/2024     08/23/2024    CO2 26 08/23/2024    ANIONGAP 16 08/23/2024    BUN 30 (H) 08/23/2024    CREATININE 0.93 08/23/2024    CALCIUM 9.5 08/23/2024    PHOS 4.1 06/26/2019    ALBUMIN 4.1 08/23/2024        Lab Results   Component Value Date    HGBA1C 8.2 (A) 12/03/2024         Assessment/Plan   Problem List Items Addressed This Visit    None  1. Low-grade nonobstructive coronary artery disease.     2. Nonischemic congestive cardiomyopathy. This patient was seen by her primary care physician in 2021 and had an abnormal EKG performed. Based on this she was scheduled to have a echocardiogram on 8/6/2021 which showed a moderately severe reduction in the patient's LV ejection fraction at 30% with 2+ moderate mitral valve regurgitation. Patient was subsequently seen by cardiology and underwent cardiac catheterization on 8/30/2021 which demonstrated low-grade coronary artery disease. As such the patient was diagnosed as having a nonischemic congestive cardiomyopathy. Of note she had had an echocardiogram performed in 2013 showing a preserved LV ejection fraction. Clinically the patient is doing well. She did have a single lead ICD implanted 5/11/2022 prophylactically which has not discharged. She did have a repeat echocardiogram performed on 3/28/2022 again demonstrating an LV ejection fraction of only 25-30% with moderate to severe left ventricular chamber enlargement. Clinically patient is doing well without any significant  shortness of breath. Blood pressure is in the lower range of normal on the current entresto 20/26 mg twice daily and the metoprolol ER 50 mg daily. Patient will be started on Jardiance 10 mg daily and the dose of metoprolol ER will be increased from 50 to 100 mg daily to improve beta-blockade. She will return in 6 months for routine follow-up. Her baseline EKG was shows sinus rhythm with a left anterior hemiblock.  Echo 01/2023  CONCLUSIONS:   1. Left ventricular systolic function is normal with a 55-60% estimated ejection fraction.   2. Spectral Doppler shows an impaired relaxation pattern of left ventricular diastolic filling.   3. Mild mitral valve regurgitation.   4. The estimated LV ejection fraction has improved from 25-30% to 55-60% since the study of 3/28/2022.     3. Status post prophylactic single-lead ICD implantation 5/11/2022. Continue follow-up in device clinic.     4. Hypertension. Adequate control on current therapy but will increase the dose of Toprol-XL as therapy for her nonischemic cardiomyopathy.     5. Type 2 diabetes. The patient's home glucose readings have typically been been between 190-240. As noted above she will be started on Jardiance 10 mg daily to supplement her current therapy     6. Obesity.     7. Hypothyroidism.     8. Status post bilateral total knee replacements.     9. Status post multiple (4) lumbosacral spine surgeries from T9-L5.     10. Status post bilateral tubal ligation.            Madie Augustine, GEOFFREY-CNP

## 2025-01-03 ENCOUNTER — TELEPHONE (OUTPATIENT)
Dept: CARDIOLOGY | Facility: CLINIC | Age: 75
End: 2025-01-03
Payer: MEDICARE

## 2025-01-03 DIAGNOSIS — I50.9 CONGESTIVE HEART FAILURE, UNSPECIFIED HF CHRONICITY, UNSPECIFIED HEART FAILURE TYPE: Primary | ICD-10-CM

## 2025-01-08 ENCOUNTER — HOSPITAL ENCOUNTER (OUTPATIENT)
Dept: RADIOLOGY | Facility: HOSPITAL | Age: 75
Discharge: HOME | End: 2025-01-08
Payer: MEDICARE

## 2025-01-08 VITALS — HEIGHT: 63 IN | BODY MASS INDEX: 39.34 KG/M2 | WEIGHT: 222 LBS

## 2025-01-08 DIAGNOSIS — Z12.31 ENCOUNTER FOR SCREENING MAMMOGRAM FOR MALIGNANT NEOPLASM OF BREAST: ICD-10-CM

## 2025-01-08 DIAGNOSIS — M54.6 THORACIC SPINE PAIN: ICD-10-CM

## 2025-01-08 PROCEDURE — 77067 SCR MAMMO BI INCL CAD: CPT

## 2025-01-08 PROCEDURE — 77063 BREAST TOMOSYNTHESIS BI: CPT | Performed by: RADIOLOGY

## 2025-01-08 PROCEDURE — 72072 X-RAY EXAM THORAC SPINE 3VWS: CPT

## 2025-01-08 PROCEDURE — 77067 SCR MAMMO BI INCL CAD: CPT | Performed by: RADIOLOGY

## 2025-01-10 ENCOUNTER — TELEPHONE (OUTPATIENT)
Dept: PRIMARY CARE | Facility: CLINIC | Age: 75
End: 2025-01-10
Payer: MEDICARE

## 2025-01-10 NOTE — TELEPHONE ENCOUNTER
Patient called left  stating she had her mammogram and xray done.  Was wondering if needed an appointment to get the results? Stating she is concern since she is still having back issues.

## 2025-01-13 ENCOUNTER — APPOINTMENT (OUTPATIENT)
Dept: PHARMACY | Facility: HOSPITAL | Age: 75
End: 2025-01-13
Payer: MEDICARE

## 2025-01-15 ENCOUNTER — OFFICE VISIT (OUTPATIENT)
Dept: RHEUMATOLOGY | Facility: CLINIC | Age: 75
End: 2025-01-15
Payer: MEDICARE

## 2025-01-15 ENCOUNTER — HOSPITAL ENCOUNTER (OUTPATIENT)
Dept: RADIOLOGY | Facility: CLINIC | Age: 75
Discharge: HOME | End: 2025-01-15
Payer: MEDICARE

## 2025-01-15 ENCOUNTER — APPOINTMENT (OUTPATIENT)
Dept: RHEUMATOLOGY | Facility: CLINIC | Age: 75
End: 2025-01-15
Payer: MEDICARE

## 2025-01-15 VITALS — DIASTOLIC BLOOD PRESSURE: 70 MMHG | WEIGHT: 222.2 LBS | SYSTOLIC BLOOD PRESSURE: 118 MMHG | BODY MASS INDEX: 39.36 KG/M2

## 2025-01-15 DIAGNOSIS — M79.641 PAIN OF RIGHT HAND: ICD-10-CM

## 2025-01-15 DIAGNOSIS — Z79.899 LONG-TERM USE OF HIGH-RISK MEDICATION: ICD-10-CM

## 2025-01-15 DIAGNOSIS — S69.91XA INJURY OF RIGHT WRIST, INITIAL ENCOUNTER: ICD-10-CM

## 2025-01-15 DIAGNOSIS — M06.4 UNDIFFERENTIATED INFLAMMATORY ARTHRITIS (MULTI): Primary | ICD-10-CM

## 2025-01-15 DIAGNOSIS — M15.9 OSTEOARTHRITIS OF MULTIPLE JOINTS, UNSPECIFIED OSTEOARTHRITIS TYPE: ICD-10-CM

## 2025-01-15 PROCEDURE — 73130 X-RAY EXAM OF HAND: CPT | Mod: RT

## 2025-01-15 PROCEDURE — 3078F DIAST BP <80 MM HG: CPT | Performed by: INTERNAL MEDICINE

## 2025-01-15 PROCEDURE — 99214 OFFICE O/P EST MOD 30 MIN: CPT | Performed by: INTERNAL MEDICINE

## 2025-01-15 PROCEDURE — 73110 X-RAY EXAM OF WRIST: CPT | Mod: RIGHT SIDE | Performed by: RADIOLOGY

## 2025-01-15 PROCEDURE — 3074F SYST BP LT 130 MM HG: CPT | Performed by: INTERNAL MEDICINE

## 2025-01-15 PROCEDURE — 1159F MED LIST DOCD IN RCRD: CPT | Performed by: INTERNAL MEDICINE

## 2025-01-15 PROCEDURE — 1157F ADVNC CARE PLAN IN RCRD: CPT | Performed by: INTERNAL MEDICINE

## 2025-01-15 PROCEDURE — 73130 X-RAY EXAM OF HAND: CPT | Mod: RIGHT SIDE | Performed by: STUDENT IN AN ORGANIZED HEALTH CARE EDUCATION/TRAINING PROGRAM

## 2025-01-15 PROCEDURE — 73110 X-RAY EXAM OF WRIST: CPT | Mod: RT

## 2025-01-15 RX ORDER — GABAPENTIN 100 MG/1
100 CAPSULE ORAL 2 TIMES DAILY
COMMUNITY

## 2025-01-15 NOTE — PROGRESS NOTES
"Recheck  OA  /  inflam arthritis    Per patient, Danette wants Hydroxychloroquine 100 mg daily.  Recent fall at home trying to get walker up the steps with injury to right hand / wrist x 1 week ago.    She did not go  to ER for evaluation.  Tylenol with little relief.      HPI - She fell down steps last wk, and she hurt her R wrist, but she didn't see anybody about it, and it's swollen and sore.  She is taking hcq 100mg bid - she breaks one in half.  \"Well I hurt - it's not a big deal\"  \"just aches\" \"my back of course\"  \"I don't know what else I can do\" \"I've just made up my mind that it's always going to hurt.\"  No joint swelling.  AM stiffness 1/2 hr.  No CP, resp, or GI.     PE  NAD  RRR no r/m/g  CTA  No edema  Diffuse R hand/wrist swelling  B Dupuytren's contracture    A/P - OA and inflam arthritis  Check x-ray hand - fx vs flare - if neg for fx, medrol dosepack  Chronic back pain - suggested pain mgmt but she declined.  She will stay with tylenol  She does not need to break the hcq - she can just take 1 in the AM.     Follow up 1 yr or sooner based on above    Addendum - no fracture, but possible ligament injury.  Also, chondocalcinosis.  Pseudogout a possibility.  We’ll get ortho opinion about possible ligamentous injury.  "

## 2025-01-17 ENCOUNTER — OFFICE VISIT (OUTPATIENT)
Dept: ORTHOPEDIC SURGERY | Facility: CLINIC | Age: 75
End: 2025-01-17
Payer: MEDICARE

## 2025-01-17 ENCOUNTER — APPOINTMENT (OUTPATIENT)
Dept: PRIMARY CARE | Facility: CLINIC | Age: 75
End: 2025-01-17
Payer: MEDICARE

## 2025-01-17 VITALS — BODY MASS INDEX: 39.34 KG/M2 | HEIGHT: 63 IN | WEIGHT: 222 LBS

## 2025-01-17 DIAGNOSIS — M79.641 RIGHT HAND PAIN: ICD-10-CM

## 2025-01-17 DIAGNOSIS — M25.531 ACUTE PAIN OF RIGHT WRIST: Primary | ICD-10-CM

## 2025-01-17 DIAGNOSIS — S63.501A WRIST SPRAIN, RIGHT, INITIAL ENCOUNTER: ICD-10-CM

## 2025-01-17 PROCEDURE — 1036F TOBACCO NON-USER: CPT

## 2025-01-17 PROCEDURE — 99213 OFFICE O/P EST LOW 20 MIN: CPT

## 2025-01-17 PROCEDURE — 99203 OFFICE O/P NEW LOW 30 MIN: CPT

## 2025-01-17 PROCEDURE — 3008F BODY MASS INDEX DOCD: CPT

## 2025-01-17 PROCEDURE — 1157F ADVNC CARE PLAN IN RCRD: CPT

## 2025-01-17 PROCEDURE — 1160F RVW MEDS BY RX/DR IN RCRD: CPT

## 2025-01-17 PROCEDURE — 1159F MED LIST DOCD IN RCRD: CPT

## 2025-01-17 PROCEDURE — 1125F AMNT PAIN NOTED PAIN PRSNT: CPT

## 2025-01-17 RX ORDER — METHYLPREDNISOLONE 4 MG/1
4 TABLET ORAL ONCE
Qty: 1 TABLET | Refills: 0 | Status: SHIPPED | OUTPATIENT
Start: 2025-01-17 | End: 2025-01-17

## 2025-01-17 ASSESSMENT — PAIN SCALES - GENERAL: PAINLEVEL_OUTOF10: 8

## 2025-01-17 ASSESSMENT — PAIN - FUNCTIONAL ASSESSMENT: PAIN_FUNCTIONAL_ASSESSMENT: 0-10

## 2025-01-17 NOTE — PROGRESS NOTES
History of Present Illness  Joana Elizabeth is a 74 y.o.s female presenting for evaluation of side: right  hand  pain for the past  week . She fell off her garage stairs last wednesday on her outstretched hand and proceed to see her Rheumatologist. Her Rheumatologist ordered Xrays and referred her to see us. She endorses swelling pain 10/10, reduced  strength. The pain is localized the the lateral distal radial head and lateral aspect of pinky finger. She has taken oxycodone for pain and tylenol which has provided some relief but not much. She has been resting, ice, heating, elevating, and using a sling and doing ROM for her fingers but has seen no improvement other than some reduced swelling. Denies numbness, tingling, f/c, n/v, CP, SOB, or any other complaints/concerns. PMH significant for RA.     Past Medical History:   Diagnosis Date    Abnormal levels of other serum enzymes 12/01/2020    Abnormal AST and ALT    Anemia, unspecified 12/28/2018    Postoperative anemia    Calculus of gallbladder without cholecystitis without obstruction 06/26/2019    Gallstones    Cardiomyopathy     s/p ICD, last seen by Dr. Maravilla on 05/14/2023    Cataract     Chronic maxillary sinusitis 03/22/2018    Right maxillary sinusitis    COVID-19 10/02/2020    COVID-19 virus infection    Depression     Diabetes mellitus (Multi)     Dorsalgia, unspecified 10/15/2019    Mid-back pain, acute    Effusion, unspecified hand 10/14/2016    Swelling of hand joint    Essential (primary) hypertension 08/19/2021    Benign essential HTN    Gastroparesis 12/05/2019    Gastroparesis    History of falling 08/04/2021    Status post fall    Hyperlipidemia     Hypothyroidism     Localized edema 05/01/2018    Leg edema    Occipital neuralgia 02/10/2017    Occipital neuralgia of right side    Osteomyelitis, unspecified 12/16/2019    Knee osteomyelits, right    Other amnesia 09/01/2020    Memory problem    Other intervertebral disc displacement, lumbar  region 04/23/2020    Disc displacement, lumbar    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    Postherpetic neuralgia    Other specified disorders of kidney and ureter 05/30/2019    Left renal mass    Personal history of other diseases of the circulatory system 08/03/2021    History of carotid artery stenosis    Personal history of other diseases of the digestive system 06/26/2019    History of biliary colic    Personal history of other infectious and parasitic diseases     History of scarlet fever    Personal history of other infectious and parasitic diseases 08/21/2017    History of herpes zoster    Personal history of other specified conditions 11/25/2015    History of right flank pain    Pneumonia, unspecified organism 03/29/2019    Left lower lobe pneumonia    Radiculopathy, cervical region 02/17/2020    Cervical radiculopathy    Radiculopathy, lumbar region 02/17/2020    Lumbar radiculitis    Repeated falls 04/17/2018    Recurrent falls    Sleep apnea     Spinal stenosis     cervical, scheduled for surgery 11/14/2023    Spondylosis without myelopathy or radiculopathy, cervical region 08/01/2017    Spondylosis of cervical region without myelopathy or radiculopathy    Spondylosis without myelopathy or radiculopathy, lumbar region 02/17/2020    Lumbar spondylosis    Stricture of artery ([facility]) 08/03/2021    Subclavian artery stenosis, right    Trigger finger, right index finger 07/30/2021    Trigger index finger of right hand    Trigger finger, right ring finger 07/30/2021    Trigger ring finger of right hand    Unspecified asthma, uncomplicated ([facility]) 03/19/2020    Asthmatic bronchitis    Vision loss        Medication Documentation Review Audit       Reviewed by Linda Prabhakar PA-C (Physician Assistant) on 01/17/25 at 1106      Medication Order Taking? Sig Documenting Provider Last Dose Status   acetaminophen (Tylenol) 325 mg suppository [id number]  Insert 1 suppository (325 mg) into the rectum  every 8 hours if needed for mild pain (1 - 3). Historical MD Kaye  Active   albuterol 90 mcg/actuation inhaler [id number] No Inhale 1 puff every 4 hours if needed. Historical MD Kaye Taking Active   aspirin 81 mg capsule [id number] No Take 1 tablet by mouth once daily. CHEW AND SWALLOW Stephanie Restrepo MD Taking Active   atorvastatin (Lipitor) 40 mg tablet [id number] No TAKE 1 TABLET BY MOUTH ONCE  DAILY January Garcia DO Taking Active   biotin 10 mg tablet [id number] No Take 1 tablet (10 mg) by mouth once daily. Stephanie Restrepo MD Taking Active   blood sugar diagnostic (Truetrack Test) strip [id number] No TEST twice a day Historical MD Kaye Taking Active   calcium carbonate-vitamin D3 600 mg-20 mcg (800 unit) tablet [id number] No Caltrate with Vitamin D3 600 mg (1,500 mg)-800 unit tablet Historical MD Kaye Taking Active   cholecalciferol (Vitamin D-3) 125 MCG (5000 UT) capsule [id number] No Take 1 capsule (125 mcg) by mouth once daily. Stepahnie Restrepo MD Taking Active   coenzyme Q-10 200 mg capsule [id number] No Take 1 capsule (200 mg) by mouth once daily. Historical MD Kaye Taking Active   cyanocobalamin (Vitamin B-12) 250 mcg tablet [id number] No Take 1 tablet (250 mcg) by mouth once daily. Historical MD Kaye Taking Active   Entresto 24-26 mg tablet [id number] No TAKE 1 TABLET BY MOUTH TWICE  DAILY Anthony Maravilla MD Taking Active   escitalopram (Lexapro) 20 mg tablet [id number] No TAKE 1 TABLET BY MOUTH ONCE  DAILY January Garcia DO Taking Active   ferrous sulfate 325 (65 Fe) MG tablet [id number] No Take 1 tablet (325 mg) by mouth 2 times a day. Stephanie Restrepo MD Taking Active   fexofenadine (Allegra) 180 mg tablet [id number] No Take 1 tablet (180 mg) by mouth once daily. Stephanie Restrepo MD Taking Active   fluticasone (Flonase) 50 mcg/actuation nasal spray [id number] No Administer 1 spray into affected nostril(s) once daily. Stephanie Restrepo MD Taking Active    folic acid (Folvite) 1 mg tablet [id number] No Take 1 tablet (1 mg) by mouth once daily. @0900 Historical Provider, MD Taking Active   furosemide (Lasix) 20 mg tablet [id number]  TAKE 1 TABLET BY MOUTH ONCE  DAILY January Garcia DO  Active   gabapentin (Neurontin) 100 mg capsule [id number]  Take 1 capsule (100 mg) by mouth 2 times a day. Historical Provider, MD  Active   gabapentin (Neurontin) 300 mg capsule [id number] No TAKE 1 CAPSULE BY MOUTH TWICE  DAILY   Patient taking differently: Take 200 mg by mouth 2 times a day.    January Garcia DO Taking Active   hydroxychloroquine (Plaquenil) 200 mg tablet [id number] No TAKE 1 TABLET BY MOUTH TWICE  DAILY   Patient taking differently: Take 1 tablet (200 mg) by mouth 1 time.    Pao Perez MD Taking Active   Jardiance 10 mg [id number] No TAKE 1 TABLET BY MOUTH DAILY Anthony Maravilla MD Taking Active   levothyroxine (Synthroid, Levoxyl) 100 mcg tablet [id number]  TAKE 1 TABLET BY MOUTH ONCE  DAILY January Garcia DO  Active   magnesium oxide (Mag-Ox) 400 mg tablet [id number] No Take 2 tablets (800 mg) by mouth once daily. Historical Provider, MD Taking Active   meloxicam (Mobic) 7.5 mg tablet [id number]  TAKE 1 TABLET BY MOUTH ONCE  DAILY Pao Perez MD  Active   metFORMIN (Glucophage) 1,000 mg tablet [id number] No TAKE 1 TABLET BY MOUTH TWICE  DAILY WITH MEALS January Garcia DO Taking Active   methocarbamol (Robaxin) 500 mg tablet [id number] No Take 1-2 tabs every 8 hours as needed for spasms Vonda Sanchez PA-C Taking Active   metoprolol succinate XL (Toprol-XL) 100 mg 24 hr tablet [id number]  TAKE 1 TABLET BY MOUTH ONCE  DAILY Anthony Maravilla MD  Active   multivit/folic acid/vit K1 (WOMEN'S 50 PLUS ADVANCED ORAL) [id number] No Take 1 tablet by mouth once daily. Historical Provider, MD Taking Active   pantoprazole (ProtoNix) 40 mg EC tablet [id number]  TAKE 1 TABLET BY MOUTH ONCE  DAILY January Garcia DO  Active                     Allergies   Allergen Reactions    Shellfish Containing Products Anaphylaxis    Codeine Nausea And Vomiting and Other     GI Intolerance    Tramadol Other and Itching     tongue blistered    Celecoxib Rash       Social History     Socioeconomic History    Marital status:      Spouse name: stefany    Number of children: Not on file    Years of education: Not on file    Highest education level: Not on file   Occupational History    Not on file   Tobacco Use    Smoking status: Never    Smokeless tobacco: Never   Vaping Use    Vaping status: Never Used   Substance and Sexual Activity    Alcohol use: Yes     Comment: OCCASIONAL    Drug use: Never    Sexual activity: Not Currently   Other Topics Concern    Not on file   Social History Narrative    Not on file     Social Drivers of Health     Financial Resource Strain: Low Risk  (11/14/2023)    Overall Financial Resource Strain (CARDIA)     Difficulty of Paying Living Expenses: Not hard at all   Food Insecurity: No Food Insecurity (11/14/2023)    Hunger Vital Sign     Worried About Running Out of Food in the Last Year: Never true     Ran Out of Food in the Last Year: Never true   Transportation Needs: No Transportation Needs (11/14/2023)    PRAPARE - Transportation     Lack of Transportation (Medical): No     Lack of Transportation (Non-Medical): No   Physical Activity: Not on file   Stress: No Stress Concern Present (11/14/2023)    [employer] - Occupational Stress Questionnaire     Feeling of Stress : Not at all   Social Connections: Moderately Isolated (11/14/2023)    Social Connection and Isolation Panel [NHANES]     Frequency of Communication with Friends and Family: Never     Frequency of Social Gatherings with Friends and Family: Never     Attends [employer] Services: Never     Active Member of Clubs or Organizations: Yes     Attends Club or Organization Meetings: Never     Marital Status:    Intimate Partner Violence:  Not At Risk (2023)    Humiliation, Afraid, Rape, and Kick questionnaire     Fear of Current or Ex-Partner: No     Emotionally Abused: No     Physically Abused: No     Sexually Abused: No   Housing Stability: Low Risk  (2023)    Housing Stability Vital Sign     Unable to Pay for Housing in the Last Year: No     Number of Places Lived in the Last Year: 1     Unstable Housing in the Last Year: No       Past Surgical History:   Procedure Laterality Date    BACK SURGERY  10/12/2017    Back Surgery    BACK SURGERY  2017    Lower Back Surgery    BACK SURGERY  2023    [facility]-C5,6,7    CARDIAC CATHETERIZATION       SECTION, CLASSIC  2013     Section    CHOLECYSTECTOMY      COLONOSCOPY      CT GUIDED PERCUTANEOUS BIOPSY MEDIASTINUM  2017    CT GUIDED PERCUTANEOUS BIOPSY MEDIASTINUM 2017 [facility]    DILATION AND CURETTAGE OF UTERUS  2013    Dilation And Curettage    INSERT / REPLACE / REMOVE PACEMAKER      KNEE ARTHROPLASTY Bilateral     OTHER SURGICAL HISTORY  2019    Knee arthroscopy    SPINE SURGERY      upper c-3 removed, bridge added, and fusion    TONSILLECTOMY  2013    Tonsillectomy    TUBAL LIGATION  2013    Tubal Ligation    UPPER GASTROINTESTINAL ENDOSCOPY          Review of Systems   30 point ROS reviewed and negative other than as listed in the HPI.      Exam  Gen: The pt is A&Ox3, NAD, and appear state age and weight  Psychiatric: mood and affect are appropriate   Eyes: sclera are white, EOM grossly intact  ENT: MMM  Neck: supple, thyroid is midline  Respiratory: respirations are nonlabored, chest rise symmetric  CV: rate is regular by palpation of distal pulses  Abdomen: nondistended   Integument: no obvious cutaneous lesions noted. No signs of lymphangitis. No signs of systemic edema.   MSK: Hand/Wrist Musculoskeletal Exam    Inspection    Right      Erythema: none      Ecchymosis: none      Edema: severe      Deformity: none     Left      Left hand/wrist inspection is normal.    Palpation    Right      Small tenderness to palpation: proximal phalanx and metacarpophalangeal joint      Wrist tenderness to palpation: radial carpal joint    Left       Left hand palpation is normal.      Left wrist palpation is normal.    Range of Motion     Left Hand      Left hand range of motion is normal.         Range of motion additional comments: Hand and Wrist ROM intact limited due to swelling and pain, 1cm DPC all fingers with active flexion and extension at all joints in the finger.    Strength    Right Hand      Thumb opposition: 3/5. Thumb opposition is affected by pain.      Left Hand      Left hand strength is normal.        Neurovascular    Right       Right neurovascular exam is normal.    Left       Left neurovascular exam is normal.    Special Tests    Right      Lieberman's test: negative      CMC grind test: positive    Left      Left special tests are normal.    General      Constitutional: appears stated age, well-developed and well-nourished    Psychiatric: normal mood and affect and no acute distress    Neurological: alert and oriented x3    Skin: intact       Imaging:  I personally reviewed multiple views of the right hand were obtained 01/15/2025 interpreted by radiology demonstrate No acute fracture or dislocation is evident. Severe 1st CMC joint degenerative changes with joint space loss and osteophytes.  Also x-rays of the right wrist which shows: No acute fracture or malalignment. Severe 1st CMC joint degenerative changes with joint space loss and osteophytes. Mild triscaphe joint degenerative changes. Mild diffuse distal interphalangeal joint degenerative changes. Widening of the scapholunate ligament interval suggesting scapholunate ligament tear. There is chronic ossific irregularity of the radial styloid which may represent sequela of remote healed fracture.      Soft tissues are within normal limits.     Assessment:  right  hand  sprain  , right wrist and hand pain    Plan:  We discussed conservative treatment to continue with resting, icing, compressing, elevating. Use a brace to help with compression and stability.  Patient states she has a brace from carpal tunnel and will begin wearing it. Prescribed steroid pack to help bring down the inflammation.     Follow up with in 2 weeks if pain persists and we try OT to help  strength if no improvement.    Natural history reviewed. All of the pt questions/concerns addressed and they are in agreement with the plan.

## 2025-02-05 ENCOUNTER — OFFICE VISIT (OUTPATIENT)
Dept: PRIMARY CARE | Facility: CLINIC | Age: 75
End: 2025-02-05
Payer: MEDICARE

## 2025-02-05 VITALS
DIASTOLIC BLOOD PRESSURE: 78 MMHG | BODY MASS INDEX: 38.09 KG/M2 | OXYGEN SATURATION: 97 % | HEART RATE: 96 BPM | TEMPERATURE: 96 F | WEIGHT: 215 LBS | SYSTOLIC BLOOD PRESSURE: 120 MMHG

## 2025-02-05 DIAGNOSIS — M25.511 PAIN AND SWELLING OF RIGHT SHOULDER: Primary | ICD-10-CM

## 2025-02-05 DIAGNOSIS — M25.411 PAIN AND SWELLING OF RIGHT SHOULDER: Primary | ICD-10-CM

## 2025-02-05 PROCEDURE — 1123F ACP DISCUSS/DSCN MKR DOCD: CPT | Performed by: FAMILY MEDICINE

## 2025-02-05 PROCEDURE — 99214 OFFICE O/P EST MOD 30 MIN: CPT | Performed by: FAMILY MEDICINE

## 2025-02-05 PROCEDURE — 3074F SYST BP LT 130 MM HG: CPT | Performed by: FAMILY MEDICINE

## 2025-02-05 PROCEDURE — 1036F TOBACCO NON-USER: CPT | Performed by: FAMILY MEDICINE

## 2025-02-05 PROCEDURE — 3078F DIAST BP <80 MM HG: CPT | Performed by: FAMILY MEDICINE

## 2025-02-05 PROCEDURE — 1159F MED LIST DOCD IN RCRD: CPT | Performed by: FAMILY MEDICINE

## 2025-02-05 PROCEDURE — 1157F ADVNC CARE PLAN IN RCRD: CPT | Performed by: FAMILY MEDICINE

## 2025-02-05 RX ORDER — OXYCODONE AND ACETAMINOPHEN 5; 325 MG/1; MG/1
1 TABLET ORAL EVERY 6 HOURS PRN
Qty: 10 TABLET | Refills: 0 | Status: SHIPPED | OUTPATIENT
Start: 2025-02-05 | End: 2025-02-15

## 2025-02-05 ASSESSMENT — ENCOUNTER SYMPTOMS
DIARRHEA: 0
FEVER: 0
HEMATURIA: 0
SLEEP DISTURBANCE: 0
JOINT SWELLING: 0
DYSURIA: 0
WEAKNESS: 0
DYSPHORIC MOOD: 0
WHEEZING: 0
ABDOMINAL PAIN: 0
SHORTNESS OF BREATH: 0
LIGHT-HEADEDNESS: 0
EYE ITCHING: 0
BLOOD IN STOOL: 0
UNEXPECTED WEIGHT CHANGE: 0
EYE DISCHARGE: 0
NUMBNESS: 0
CONSTIPATION: 0
APPETITE CHANGE: 0
MYALGIAS: 0
NAUSEA: 0
NERVOUS/ANXIOUS: 0
DIZZINESS: 0
COUGH: 0
SINUS PRESSURE: 0
ARTHRALGIAS: 1
HEADACHES: 0
SORE THROAT: 0
PALPITATIONS: 0
FLANK PAIN: 0
ACTIVITY CHANGE: 0
RHINORRHEA: 0
VOMITING: 0

## 2025-02-05 NOTE — PROGRESS NOTES
"Subjective   Patient ID: Joana Elizabeth is a 75 y.o. female who presents for Pain (HERE FOR PAIN THAT IS FROM HER RIGHT SHOULDER DOWN INTO HER HAND, PT STATES SHE FELL IN JANUARY , STATES IT HASN'T STOPPED THROBBING SINCE SHE FELL ).    HPI PATIENT DOES HAS RHEUMATOID   SHE WAS AT DR CASTANON\"S AND SHE ORDERED PLAIN FILMS OF  HER RIGHT HAND AND WRIST WHICH ARE REVIEWED. THESE REPORT NO ACUTE FRACTURE BUT MODERATED PSEUDOGOUT AND CHONDROCALCINOSIS /SEVERE DJD FIRST CMC JOINT /PERHAPS A SCAPHOLUNATE TEAR ALSO   DR CASTANON HAS RECOMMENDED ORTHOPEDIC WHO RECOMMENDED CONSERVATIVE THERAPY, BRACING FOR TWO WEEKS AND THEN FOLLOW IF NOT RESOLVED. AND SHE HAS AN APPOINTMENT 2-11-25     Review of Systems   Constitutional:  Negative for activity change, appetite change, fever and unexpected weight change.   HENT:  Negative for congestion, ear pain, postnasal drip, rhinorrhea, sinus pressure and sore throat.    Eyes:  Negative for discharge, itching and visual disturbance.   Respiratory:  Negative for cough, shortness of breath and wheezing.    Cardiovascular:  Negative for chest pain, palpitations and leg swelling.   Gastrointestinal:  Negative for abdominal pain, blood in stool, constipation, diarrhea, nausea and vomiting.   Endocrine: Negative for cold intolerance, heat intolerance and polyuria.   Genitourinary:  Negative for dysuria, flank pain and hematuria.   Musculoskeletal:  Positive for arthralgias. Negative for gait problem, joint swelling and myalgias.        TEAT AT SCAPHOLUNATE   PAIN IN RIGHT SHOULDER AND UPPER ARM   ALSO THE RIGHT HAND EXTREME PAIN   TAKING PERCOCET FOR THE PAIN      Skin:  Negative for rash.   Allergic/Immunologic: Negative for environmental allergies and food allergies.   Neurological:  Negative for dizziness, syncope, weakness, light-headedness, numbness and headaches.   Psychiatric/Behavioral:  Negative for dysphoric mood and sleep disturbance. The patient is not nervous/anxious.        Objective "   /78 (BP Location: Left arm)   Pulse 96   Temp 35.6 °C (96 °F)   Wt 97.5 kg (215 lb)   SpO2 97%   BMI 38.09 kg/m²     Physical Exam  Vitals and nursing note reviewed.   Constitutional:       Appearance: Normal appearance.   HENT:      Head: Normocephalic.   Cardiovascular:      Rate and Rhythm: Normal rate and regular rhythm.      Pulses: Normal pulses.      Heart sounds: No murmur heard.     No friction rub. No gallop.   Pulmonary:      Effort: Pulmonary effort is normal. No respiratory distress.      Breath sounds: No wheezing.   Abdominal:      General: There is no distension.      Tenderness: There is no abdominal tenderness.   Musculoskeletal:         General: Swelling, tenderness, deformity and signs of injury present.      Comments: PAIN AND SWELLING RIGHT SHOULDER AND THE RIGHT FOREARM AND WRIST   POINT TENDERNESS OVER MID SHAFT OF HUMERUS AND UPPER SHOULDER JOINT     Skin:     General: Skin is warm and dry.      Capillary Refill: Capillary refill takes less than 2 seconds.   Neurological:      General: No focal deficit present.      Mental Status: She is alert and oriented to person, place, and time.   Psychiatric:         Mood and Affect: Mood normal.         Assessment/Plan   Problem List Items Addressed This Visit    None  Visit Diagnoses         Codes    Pain and swelling of right shoulder    -  Primary M25.511, M25.411    Relevant Medications    oxyCODONE-acetaminophen (Percocet) 5-325 mg tablet    Other Relevant Orders    XR shoulder right 2+ views    XR elbow right 1-2 views

## 2025-02-07 ENCOUNTER — HOSPITAL ENCOUNTER (OUTPATIENT)
Dept: RADIOLOGY | Facility: HOSPITAL | Age: 75
Discharge: HOME | End: 2025-02-07
Payer: MEDICARE

## 2025-02-07 DIAGNOSIS — M25.511 PAIN AND SWELLING OF RIGHT SHOULDER: ICD-10-CM

## 2025-02-07 DIAGNOSIS — M25.411 PAIN AND SWELLING OF RIGHT SHOULDER: ICD-10-CM

## 2025-02-07 PROCEDURE — 73080 X-RAY EXAM OF ELBOW: CPT | Mod: RT

## 2025-02-07 PROCEDURE — 73030 X-RAY EXAM OF SHOULDER: CPT | Mod: RT

## 2025-02-10 NOTE — PROGRESS NOTES
Pharmacist Clinic: Cardiology Management    Joana Elizabeth is a 75 y.o. female was referred to Clinical Pharmacy Team for heart failure management.     Referring Provider: Madie Augustine APRN*    THIS IS A NEW PATIENT APPOINTMENT. PATIENT WILL BE ESTABLISHING CARE WITH CLINICAL PHARMACY.    Appointment was completed by Joana Elizabeth who was reached at [phone].    REVIEW OF PAST APPNT (IF APPLICABLE):   N/A    Allergies Reviewed? Yes    Allergies   Allergen Reactions    Shellfish Containing Products Anaphylaxis    Codeine Nausea And Vomiting and Other     GI Intolerance    Tramadol Other and Itching     tongue blistered    Celecoxib Rash       Past Medical History:   Diagnosis Date    Abnormal levels of other serum enzymes 12/01/2020    Abnormal AST and ALT    Anemia, unspecified 12/28/2018    Postoperative anemia    Calculus of gallbladder without cholecystitis without obstruction 06/26/2019    Gallstones    Cardiomyopathy     s/p ICD, last seen by Dr. Maravilla on 05/14/2023    Cataract     Chronic maxillary sinusitis 03/22/2018    Right maxillary sinusitis    COVID-19 10/02/2020    COVID-19 virus infection    Depression     Diabetes mellitus (Multi)     Dorsalgia, unspecified 10/15/2019    Mid-back pain, acute    Effusion, unspecified hand 10/14/2016    Swelling of hand joint    Essential (primary) hypertension 08/19/2021    Benign essential HTN    Gastroparesis 12/05/2019    Gastroparesis    History of falling 08/04/2021    Status post fall    Hyperlipidemia     Hypothyroidism     Localized edema 05/01/2018    Leg edema    Occipital neuralgia 02/10/2017    Occipital neuralgia of right side    Osteomyelitis, unspecified 12/16/2019    Knee osteomyelits, right    Other amnesia 09/01/2020    Memory problem    Other intervertebral disc displacement, lumbar region 04/23/2020    Disc displacement, lumbar    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    Postherpetic neuralgia    Other specified  disorders of kidney and ureter 05/30/2019    Left renal mass    Personal history of other diseases of the circulatory system 08/03/2021    History of carotid artery stenosis    Personal history of other diseases of the digestive system 06/26/2019    History of biliary colic    Personal history of other infectious and parasitic diseases     History of scarlet fever    Personal history of other infectious and parasitic diseases 08/21/2017    History of herpes zoster    Personal history of other specified conditions 11/25/2015    History of right flank pain    Pneumonia, unspecified organism 03/29/2019    Left lower lobe pneumonia    Radiculopathy, cervical region 02/17/2020    Cervical radiculopathy    Radiculopathy, lumbar region 02/17/2020    Lumbar radiculitis    Repeated falls 04/17/2018    Recurrent falls    Sleep apnea     Spinal stenosis     cervical, scheduled for surgery 11/14/2023    Spondylosis without myelopathy or radiculopathy, cervical region 08/01/2017    Spondylosis of cervical region without myelopathy or radiculopathy    Spondylosis without myelopathy or radiculopathy, lumbar region 02/17/2020    Lumbar spondylosis    Stricture of artery (CMS-HCC) 08/03/2021    Subclavian artery stenosis, right    Trigger finger, right index finger 07/30/2021    Trigger index finger of right hand    Trigger finger, right ring finger 07/30/2021    Trigger ring finger of right hand    Unspecified asthma, uncomplicated ([facility]) 03/19/2020    Asthmatic bronchitis    Vision loss        Current Outpatient Medications on File Prior to Visit   Medication Sig Dispense Refill    albuterol 90 mcg/actuation inhaler Inhale 1 puff every 4 hours if needed.      aspirin 81 mg capsule Take 1 tablet by mouth once daily. CHEW AND SWALLOW      atorvastatin (Lipitor) 40 mg tablet TAKE 1 TABLET BY MOUTH ONCE  DAILY 90 tablet 3    biotin 10 mg tablet Take 1 tablet (10 mg) by mouth once daily.      calcium carbonate-vitamin D3 600 mg-20  mcg (800 unit) tablet Caltrate with Vitamin D3 600 mg (1,500 mg)-800 unit tablet      coenzyme Q-10 200 mg capsule Take 1 capsule (200 mg) by mouth once daily.      cyanocobalamin (Vitamin B-12) 250 mcg tablet Take 1 tablet (250 mcg) by mouth once daily.      Entresto 24-26 mg tablet TAKE 1 TABLET BY MOUTH TWICE  DAILY 180 tablet 3    escitalopram (Lexapro) 20 mg tablet TAKE 1 TABLET BY MOUTH ONCE  DAILY 90 tablet 3    ferrous sulfate 325 (65 Fe) MG tablet Take 1 tablet (325 mg) by mouth 2 times a day.      fexofenadine (Allegra) 180 mg tablet Take 1 tablet (180 mg) by mouth once daily.      fluticasone (Flonase) 50 mcg/actuation nasal spray Administer 1 spray into affected nostril(s) once daily.      folic acid (Folvite) 1 mg tablet Take 1 tablet (1 mg) by mouth once daily. @0900      furosemide (Lasix) 20 mg tablet TAKE 1 TABLET BY MOUTH ONCE  DAILY 90 tablet 3    hydroxychloroquine (Plaquenil) 200 mg tablet TAKE 1 TABLET BY MOUTH TWICE  DAILY 180 tablet 2    Jardiance 10 mg TAKE 1 TABLET BY MOUTH DAILY 90 tablet 3    levothyroxine (Synthroid, Levoxyl) 100 mcg tablet TAKE 1 TABLET BY MOUTH ONCE  DAILY 90 tablet 3    magnesium oxide (Mag-Ox) 400 mg tablet Take 2 tablets (800 mg) by mouth once daily.      meloxicam (Mobic) 7.5 mg tablet TAKE 1 TABLET BY MOUTH ONCE  DAILY 90 tablet 3    metFORMIN (Glucophage) 1,000 mg tablet TAKE 1 TABLET BY MOUTH TWICE  DAILY WITH MEALS 180 tablet 3    methocarbamol (Robaxin) 500 mg tablet Take 1-2 tabs every 8 hours as needed for spasms 60 tablet 2    metoprolol succinate XL (Toprol-XL) 100 mg 24 hr tablet TAKE 1 TABLET BY MOUTH ONCE  DAILY 90 tablet 3    multivit/folic acid/vit K1 (WOMEN'S 50 PLUS ADVANCED ORAL) Take 1 tablet by mouth once daily.      pantoprazole (ProtoNix) 40 mg EC tablet TAKE 1 TABLET BY MOUTH ONCE  DAILY 90 tablet 3    acetaminophen (Tylenol) 325 mg suppository Insert 1 suppository (325 mg) into the rectum every 8 hours if needed for mild pain (1 - 3).  (Patient not taking: Reported on 2025)      blood sugar diagnostic (Truetrack Test) strip TEST twice a day      gabapentin (Neurontin) 100 mg capsule Take 1 capsule (100 mg) by mouth 2 times a day.      gabapentin (Neurontin) 300 mg capsule TAKE 1 CAPSULE BY MOUTH TWICE  DAILY (Patient taking differently: Take 200 mg by mouth 2 times a day.) 180 capsule 3    [] oxyCODONE-acetaminophen (Percocet) 5-325 mg tablet Take 1 tablet by mouth every 6 hours if needed for severe pain (7 - 10) for up to 10 days. 10 tablet 0    [DISCONTINUED] hydroxychloroquine (Plaquenil) 200 mg tablet TAKE 1 TABLET BY MOUTH TWICE  DAILY 180 tablet 1     No current facility-administered medications on file prior to visit.         RELEVANT LAB RESULTS:  Lab Results   Component Value Date    BILITOT 0.5 2024    CALCIUM 9.5 2024    CO2 26 2024     2024    CREATININE 0.93 2024    GLUCOSE 186 (H) 2024    ALKPHOS 49 2024    K 4.3 2024    PROT 6.5 2024     2024    AST 15 2024    ALT 14 2024    BUN 30 (H) 2024    ANIONGAP 16 2024    MG 1.93 2019    PHOS 4.1 2019    ALBUMIN 4.1 2024    LIPASE 26 2018    GFRF 69 2023     Lab Results   Component Value Date    TRIG 479 (H) 2024    CHOL 163 2024    LDLCALC  2024      Comment:      The calculation of LDL and VLDL are inaccurate when the Triglycerides are greater than 400 mg/dL or when the patient is non-fasting. If LDL measurement is necessary contact the testing laboratory for an alternative LDL assay.                                  Near   Borderline      AGE      Desirable  Optimal    High     High     Very High     0-19 Y     0 - 109     ---    110-129   >/= 130     ----    20-24 Y     0 - 119     ---    120-159   >/= 160     ----      >24 Y     0 -  99   100-129  130-159   160-189     >/=190      HDL 35.8 2024     No results found for:  "\"BMCBC\", \"CBCDIF\"     PHARMACEUTICAL ASSESSMENT:    MEDICATION RECONCILIATION    Home Pharmacy Reviewed? Yes, describe: [facility]    Added:  - None  Changed:  - None  Removed:  - None    Drug Interactions? No    Medication Documentation Review Audit       Reviewed by Jahaira Alfaro, PharmD (Pharmacist) on 02/17/25 at 1302      Medication Order Taking? Sig Documenting Provider Last Dose Status   acetaminophen (Tylenol) 325 mg suppository [id number]  Insert 1 suppository (325 mg) into the rectum every 8 hours if needed for mild pain (1 - 3).   Patient not taking: Reported on 2/17/2025    Historical MD Kaye  Active   albuterol 90 mcg/actuation inhaler [id number] Yes Inhale 1 puff every 4 hours if needed. Historical MD Kaye Taking Active   aspirin 81 mg capsule [id number] Yes Take 1 tablet by mouth once daily. CHEW AND SWALLOW Stephanie Restrepo MD Taking Active   atorvastatin (Lipitor) 40 mg tablet [id number] Yes TAKE 1 TABLET BY MOUTH ONCE  DAILY January Garcia,  Taking Active   biotin 10 mg tablet [id number] Yes Take 1 tablet (10 mg) by mouth once daily. Historical MD Kaye Taking Active   blood sugar diagnostic (Truetrack Test) strip [id number] No TEST twice a day Historical MD Kaye Taking Active   calcium carbonate-vitamin D3 600 mg-20 mcg (800 unit) tablet [id number] Yes Caltrate with Vitamin D3 600 mg (1,500 mg)-800 unit tablet Historical MD Kaye Taking Active   coenzyme Q-10 200 mg capsule [id number] Yes Take 1 capsule (200 mg) by mouth once daily. Historical MD Kaye Taking Active   cyanocobalamin (Vitamin B-12) 250 mcg tablet [id number] Yes Take 1 tablet (250 mcg) by mouth once daily. Historical MD Kaye Taking Active   Entresto 24-26 mg tablet [id number] Yes TAKE 1 TABLET BY MOUTH TWICE  DAILY Anthony Maravilla MD Taking Active   escitalopram (Lexapro) 20 mg tablet [id number] Yes TAKE 1 TABLET BY MOUTH ONCE  DAILY January Garcia,  Taking Active   ferrous sulfate 325 (65 Fe) " MG tablet [id number] Yes Take 1 tablet (325 mg) by mouth 2 times a day. Historical Provider, MD Taking Active   fexofenadine (Allegra) 180 mg tablet [id number] Yes Take 1 tablet (180 mg) by mouth once daily. Historical Provider, MD Taking Active   fluticasone (Flonase) 50 mcg/actuation nasal spray [id number] Yes Administer 1 spray into affected nostril(s) once daily. Stephanie Provider, MD Taking Active   folic acid (Folvite) 1 mg tablet [id number] Yes Take 1 tablet (1 mg) by mouth once daily. @0900 Stephanie Restrepo MD Taking Active   furosemide (Lasix) 20 mg tablet [id number] Yes TAKE 1 TABLET BY MOUTH ONCE  DAILY January Garcia DO  Active   gabapentin (Neurontin) 100 mg capsule [id number]  Take 1 capsule (100 mg) by mouth 2 times a day. Stephanie Restrepo MD  Active   gabapentin (Neurontin) 300 mg capsule [id number] No TAKE 1 CAPSULE BY MOUTH TWICE  DAILY   Patient taking differently: Take 200 mg by mouth 2 times a day.    January Garcia DO Taking Active   Discontinued 02/11/25 1448   hydroxychloroquine (Plaquenil) 200 mg tablet [id number] Yes TAKE 1 TABLET BY MOUTH TWICE  DAILY Pao Perez MD  Active   Jardiance 10 mg [id number] Yes TAKE 1 TABLET BY MOUTH DAILY Anthony Maravilla MD Taking Active   levothyroxine (Synthroid, Levoxyl) 100 mcg tablet [id number] Yes TAKE 1 TABLET BY MOUTH ONCE  DAILY January Garcia DO  Active   magnesium oxide (Mag-Ox) 400 mg tablet [id number] Yes Take 2 tablets (800 mg) by mouth once daily. Stephanie Restrepo MD Taking Active   meloxicam (Mobic) 7.5 mg tablet [id number] Yes TAKE 1 TABLET BY MOUTH ONCE  DAILY Pao Perez MD  Active   metFORMIN (Glucophage) 1,000 mg tablet [id number] Yes TAKE 1 TABLET BY MOUTH TWICE  DAILY WITH MEALS January Garcia DO Taking Active   methocarbamol (Robaxin) 500 mg tablet [id number] Yes Take 1-2 tabs every 8 hours as needed for spasms Vonda Sanchez PA-C Taking Active   metoprolol succinate XL (Toprol-XL) 100 mg 24 hr  "tablet [id number] Yes TAKE 1 TABLET BY MOUTH ONCE  DAILY Anthony Maravilla MD  Active   multivit/folic acid/vit K1 (WOMEN'S 50 PLUS ADVANCED ORAL) [id number] Yes Take 1 tablet by mouth once daily. Historical Provider, MD Taking Active   oxyCODONE-acetaminophen (Percocet) 5-325 mg tablet [id number]  Take 1 tablet by mouth every 6 hours if needed for severe pain (7 - 10) for up to 10 days. January Garcia DO   02/15/25 2359   pantoprazole (ProtoNix) 40 mg EC tablet [id number] Yes TAKE 1 TABLET BY MOUTH ONCE  DAILY January Garcia DO  Active                    DISEASE MANAGEMENT ASSESSMENT:     CHF ASSESSMENT     Symptom/Staging: Unable to assess  -Most recent ejection fraction: 55-60%  -NYHA Stage: Unable to assess    Results for orders placed in visit on 23    Echocardiogram    Narrative  [facility], [address] [place] [address]  Tel [phone] and Fax [phone]    TRANSTHORACIC ECHOCARDIOGRAM REPORT      Patient Name:     EULOGIO Spear Physician:   [id number] Anthony Maravilla MD  Study Date:       2023      Referring Physician: KATELYNN CASTILLO  MRN/PID:          [id number]       PCP:  Accession/Order#: [id number]   Department Location: [facility]  YOB: 1950      Fellow:  Gender:           F              Nurse:  Admit Date:                      Sonographer:         Mee Harper \"[facility]\"  Admission Status: Outpatient     Additional Staff:  Height:           160.02 cm      CC Report to:  Weight:           102.06 kg      Study Type:          Echocardiogram  BSA:              2.03 m2  Blood Pressure: 120 /66 mmHg    Diagnosis/ICD: I42.0-Dilated cardiomyopathy  Indication:    Cardiomyopathy  Procedure/CPT: Echo Complete w Full [id number]    Patient History:  Pacer/Defib:       AICD  Pertinent History: Cardiomyopathy and HTN. LV dysfunction.    Study Detail: The following Echo studies were performed: 2D, M-Mode, Doppler and  color " flow.      PHYSICIAN INTERPRETATION:  Left Ventricle: The left ventricular systolic function is normal, with an estimated ejection fraction of 55-60%. The calculated ejection fraction is normal at 61 % using the Roy's Bi-plane MOD calculation. There are no regional wall motion abnormalities. The left ventricular cavity size is normal. There is borderline concentric left ventricular hypertrophy. Spectral Doppler shows an impaired relaxation pattern of left ventricular diastolic filling.  Left Atrium: The left atrium is upper limits of normal in size.  Right Ventricle: The right ventricle is normal in size. There is normal right ventriclar wall thickness. There is normal right ventricular global systolic function. A device is visualized in the right ventricle.  Right Atrium: The right atrium is normal in size. There is a device visualized in the right atrium.  Aortic Valve: The aortic valve appears structurally normal. The aortic valve appears tricuspid and non-restricted. There is no evidence of aortic valve regurgitation. The peak instantaneous gradient of the aortic valve is 16.8 mmHg. The mean gradient of the aortic valve is 9.0 mmHg.  Mitral Valve: The mitral valve is normal in structure. There is normal mitral valve leaflet mobility. There is mild mitral valve regurgitation.  Tricuspid Valve: The tricuspid valve is structurally normal. There is normal tricuspid valve leaflet mobility. There is trace tricuspid regurgitation.  Pulmonic Valve: The pulmonic valve is structurally normal. There is trace pulmonic valve regurgitation.  Pericardium: There is no pericardial effusion noted.  Aorta: The aortic root is normal. The Ao Sinus is 3.30 cm. There is no dilatation of the aortic arch. There is no dilatation of the ascending aorta. There is no dilatation of the aortic root.  Pulmonary Artery: The estimated PASP is 25 mmHg.  In comparison to the previous echocardiogram(s): Compared with study from 3/28/2022,. The  estimated LV ejection fraction has improved from 25-30% to 55-60% since the study of 3/28/2022.      CONCLUSIONS:  1. Left ventricular systolic function is normal with a 55-60% estimated ejection fraction.  2. Spectral Doppler shows an impaired relaxation pattern of left ventricular diastolic filling.  3. Mild mitral valve regurgitation.  4. The estimated LV ejection fraction has improved from 25-30% to 55-60% since the study of 3/28/2022.    QUANTITATIVE DATA SUMMARY:  2D MEASUREMENTS:  Normal Ranges:  Ao Root d:     3.30 cm    (2.0-3.7cm)  LAs:           3.80 cm    (2.7-4.0cm)  IVSd:          1.30 cm    (0.6-1.1cm)  LVPWd:         1.00 cm    (0.6-1.1cm)  LVIDd:         5.20 cm    (3.9-5.9cm)  LVIDs:         3.30 cm  LV Mass Index: 115.4 g/m2  LV % FS        36.5 %    LA VOLUME:  Normal Ranges:  LA Area A4C:     15.3 cm2  LA Area A2C:     17.6 cm2  LA Volume Index: 24.0 ml/m2  LA Vol A4C:      38.0 ml  LA Vol A2C:      49.5 ml  LA Vol BP:       50.0 ml    AORTA MEASUREMENTS:  Normal Ranges:  Ao Sinus, d: 3.30 cm (2.1-3.5cm)    LV SYSTOLIC FUNCTION BY 2D PLANIMETRY (MOD):  Normal Ranges:  EF-A4C View: 65.0 % (>=55%)  EF-A2C View: 59.0 %  EF-Biplane:  60.8 %    LV DIASTOLIC FUNCTION:  Normal Ranges:  MV Peak E:        0.62 m/s    (0.7-1.2 m/s)  MV Peak A:        0.89 m/s    (0.42-0.7 m/s)  E/A Ratio:        0.69        (1.0-2.2)  MV lateral e'     0.10 m/s  MV medial e'      0.10 m/s  MV A Dur:         145.00 msec  E/e' Ratio:       6.00        (<8.0)  PulmV Sys Mata:    76.00 cm/s  PulmV Beavers Mata:   43.40 cm/s  PulmV S/D Mata:    1.80  PulmV A Revs Mata: 51.30 cm/s  PulmV A Revs Dur: 137.00 msec    MITRAL VALVE:  Normal Ranges:  MV DT: 156 msec (150-240msec)    AORTIC VALVE:  Normal Ranges:  AoV Vmax:                2.05 m/s  (<=1.7m/s)  AoV Peak P.8 mmHg (<20mmHg)  AoV Mean P.0 mmHg  (1.7-11.5mmHg)  LVOT Max Mata:            1.38 m/s  (<=1.1m/s)  AoV VTI:                 49.30 cm   (18-25cm)  LVOT VTI:                32.30 cm  LVOT Diameter:           2.10 cm   (1.8-2.4cm)  AoV Area, VTI:           2.27 cm2  (2.5-5.5cm2)  AoV Area,Vmax:           2.33 cm2  (2.5-4.5cm2)  AoV Dimensionless Index: 0.66    RIGHT VENTRICLE:  RV 1   3.2 cm  RV 2   3.3 cm  RV 3   6.2 cm  TAPSE: 31.0 mm    TRICUSPID VALVE/RVSP:  Normal Ranges:  Peak TR Velocity: 2.36 m/s  RV Syst Pressure: 25.3 mmHg (< 30mmHg)    PULMONIC VALVE:  Normal Ranges:  PV Max Mata: 1.0 m/s  (0.6-0.9m/s)  PV Max PG:  3.6 mmHg    Pulmonary Veins:  PulmV A Revs Dur: 137.00 msec  PulmV A Revs Mata: 51.30 cm/s  PulmV Beavers Mata:   43.40 cm/s  PulmV S/D Mata:    1.80  PulmV Sys Mata:    76.00 cm/s      [id number] Anthony Maravilla MD  Electronically signed on 1/15/2023 at 2:58:06 PM         Final       Guideline-Directed Medical Therapy:  -ARNI: Yes, describe: Entresto 24-26 mg BID  -Beta Blocker: Yes, describe: metoprolol succinate 100 mg daily   -MRA: No  -SGLT2i: Yes, describe: Jardiance 10 mg daily     Secondary Prevention:  -The 10-year ASCVD risk score (Brandon EL, et al., 2019) is: 32.4%    Values used to calculate the score:      Age: 75 years      Sex: Female      Is Non- : No      Diabetic: Yes      Tobacco smoker: No      Systolic Blood Pressure: 120 mmHg      Is BP treated: Yes      HDL Cholesterol: 35.8 mg/dL      Total Cholesterol: 163 mg/dL   -Aspirin 81mg? yes  -Statin?: Yes, describe: atorvastatin 40 mg  -HTN?: Yes, describe: metoprolol succinate 100 mg    CURRENT PHARMACOTHERAPY:    Entresto 24-26 mg BID  Jardiance 10 mg daily  Metoprolol succinate 100 mg daily    Affordability: Patient reports Patient reports Entresto and Jardiance are expensive   Adherence/Compliance: No concerns for adherence. Patient states she has a 1 month pill container to help with adherence   Adverse Effects: Patient reports that she has diarrhea with metformin     Monitoring Weights at Home: Yes  Home Weight Recordings: 215 lbs    Past In Office  Weight Readings:   Wt Readings from Last 6 Encounters:   02/05/25 97.5 kg (215 lb)   01/17/25 101 kg (222 lb)   01/15/25 101 kg (222 lb 3.2 oz)   01/08/25 101 kg (222 lb)   01/02/25 101 kg (222 lb)   12/03/24 101 kg (222 lb)       Monitoring Blood Pressure at Home: Yes  Home BP Recordings: 114/74    Past In Office BP Readings:   BP Readings from Last 6 Encounters:   02/05/25 120/78   01/15/25 118/70   01/02/25 113/75   12/03/24 112/60   08/27/24 140/82   05/30/24 108/73       HEALTH MANAGEMENT    Maintaining fluid restriction (<2 L/day): No  Edema/swelling: No  Shortness of breath: No  Trouble sleeping/lying down: No  Dry/hacking cough: No  Recent Hospitalizations: No    EDUCATION/COUNSELING:   - Counseled patient on MOA, expectations, duration of therapy, contraindications, administration, and monitoring parameters  - Counseled patient on lifestyle modifications that can decrease your risk of having complications (smoking cessation, losing weight, daily weights, vaccines)  - Counseled patient on fluid intake and weight management. Recommended to not consume more than 2 liters of fliuids per day. If they have gained more than 2-3 pounds within a 24 hours period (or 5 pounds in a week), contact their cardiologist  - Counseled patient on the importance of staying hydrated   - Answered all patient questions and concerns        Patient Assistance Program (PAP)    Application for program to be submitted for the following medications: [facility] Permanent Address: [place]   Prescription Insurance:   Yes   Members of Household: 2   Files Taxes: Yes     Patient will be email financial information to pharmacist directly at [email or web address].    Patient verbally reports monthly or yearly income which is less than 400% federal poverty level    Patient aware this process may take up to 6 weeks.     If approved medication must be filled through [facility] and MEDICATION WILL BE  MAILED TO PATIENT.      DISCUSSION/NOTES:   Today's appointment was initial visit to establish care with Clinical Pharmacy. Joana reports doing well on Entresto and Jardiance with no side effects. She reports that both medications together cost around $1,000 every 3 months.  Will apply for  PAP. Awaiting documents from patient.  Patient given pharmacist's phone number for any questions/concerns. Will follow up in 1 month.   Also, patient mentioned starting a GLP-1 as she saw commercials that stated it helps with heart, blood sugars and weight loss. Recommended patient to contact PCP or cardiologist for approval.     ASSESSMENT:    Assessment/Plan   Problem List Items Addressed This Visit       Cardiomyopathy - Primary     Continue 3/4 GDMT. Will apply for [facility] PAP for Entresto and Jardiance copay assistance. Consider a MRA in the future to complete GDMT.           Other Visit Diagnoses       Congestive heart failure, unspecified HF chronicity, unspecified heart failure type        Relevant Orders    Referral to Clinical Pharmacy            RECOMMENDATIONS/PLAN:    Continue:  Entresto 24-26 mg twice daily  Jardiance 10 mg daily  Metoprolol succinate 100 mg daily   2.    Follow-up: 1 month    Last Appnt with Referring Provider: 1/2/2025  Next Appnt with Referring Provider: 7/3/2025  Clinical Pharmacist follow up: 3/17/2025  VAF/Application Expiration: Yes    Date: Pending  Type of Encounter: Fernanda MirelseD  PGY-1 Pharmacy Resident     Verbal consent to manage patient's drug therapy was obtained from the patient . They were informed they may decline to participate or withdraw from participation in pharmacy services at any time.    Continue all meds under the continuation of care with the referring provider and clinical pharmacy team.

## 2025-02-11 ENCOUNTER — OFFICE VISIT (OUTPATIENT)
Dept: ORTHOPEDIC SURGERY | Facility: CLINIC | Age: 75
End: 2025-02-11
Payer: MEDICARE

## 2025-02-11 DIAGNOSIS — M43.10 ACQUIRED SPONDYLOLISTHESIS: ICD-10-CM

## 2025-02-11 DIAGNOSIS — M19.041 PRIMARY OSTEOARTHRITIS OF RIGHT HAND: ICD-10-CM

## 2025-02-11 DIAGNOSIS — M79.641 RIGHT HAND PAIN: ICD-10-CM

## 2025-02-11 DIAGNOSIS — M79.89 SWELLING OF RIGHT HAND: Primary | ICD-10-CM

## 2025-02-11 DIAGNOSIS — M47.12 CERVICAL ARTHRITIS WITH MYELOPATHY: ICD-10-CM

## 2025-02-11 PROCEDURE — 1157F ADVNC CARE PLAN IN RCRD: CPT | Performed by: ORTHOPAEDIC SURGERY

## 2025-02-11 PROCEDURE — 1125F AMNT PAIN NOTED PAIN PRSNT: CPT | Performed by: ORTHOPAEDIC SURGERY

## 2025-02-11 PROCEDURE — 99213 OFFICE O/P EST LOW 20 MIN: CPT | Performed by: ORTHOPAEDIC SURGERY

## 2025-02-11 PROCEDURE — 1159F MED LIST DOCD IN RCRD: CPT | Performed by: ORTHOPAEDIC SURGERY

## 2025-02-11 PROCEDURE — 1123F ACP DISCUSS/DSCN MKR DOCD: CPT | Performed by: ORTHOPAEDIC SURGERY

## 2025-02-11 PROCEDURE — 1160F RVW MEDS BY RX/DR IN RCRD: CPT | Performed by: ORTHOPAEDIC SURGERY

## 2025-02-11 PROCEDURE — 1036F TOBACCO NON-USER: CPT | Performed by: ORTHOPAEDIC SURGERY

## 2025-02-11 RX ORDER — HYDROXYCHLOROQUINE SULFATE 200 MG/1
TABLET, FILM COATED ORAL 2 TIMES DAILY
Qty: 180 TABLET | Refills: 2 | Status: SHIPPED | OUTPATIENT
Start: 2025-02-11

## 2025-02-11 ASSESSMENT — ENCOUNTER SYMPTOMS
SINUS PAIN: 0
TROUBLE SWALLOWING: 0
SHORTNESS OF BREATH: 0
JOINT SWELLING: 1
FEVER: 0
EYE DISCHARGE: 0
WHEEZING: 0
CHILLS: 0
ARTHRALGIAS: 1

## 2025-02-11 ASSESSMENT — PAIN - FUNCTIONAL ASSESSMENT: PAIN_FUNCTIONAL_ASSESSMENT: 0-10

## 2025-02-11 ASSESSMENT — PAIN SCALES - GENERAL: PAINLEVEL_OUTOF10: 7

## 2025-02-11 NOTE — PROGRESS NOTES
Reason for Appointment  Chief Complaint   Patient presents with    Right Hand - Pain, Edema     History of Present Illness  Patient is a 75 y.o. female here today for a new problem of right shoulder and hand pain and swelling.  She had a fall about 6 weeks ago onto the right arm, since that time she has had increased shoulder pain but also significant swelling and pain in the right hand.  She does have significant arthritis throughout the hand and wrist, x-rays taken at the emergency room show severe AC joint DJD with mild glenohumeral joint arthritis, no acute fractures in the shoulder or elbow and a SLAC wrist with severe CMC and scattered distal digit DJD.  Shoulder and elbow pain is slowly improving but she continues to have significant swelling and stiffness in the digits.  No other changes in her past medical history, allergies, or medications.    Past Medical History:   Diagnosis Date    Abnormal levels of other serum enzymes 12/01/2020    Abnormal AST and ALT    Anemia, unspecified 12/28/2018    Postoperative anemia    Calculus of gallbladder without cholecystitis without obstruction 06/26/2019    Gallstones    Cardiomyopathy     s/p ICD, last seen by Dr. Maravilla on 05/14/2023    Cataract     Chronic maxillary sinusitis 03/22/2018    Right maxillary sinusitis    COVID-19 10/02/2020    COVID-19 virus infection    Depression     Diabetes mellitus (Multi)     Dorsalgia, unspecified 10/15/2019    Mid-back pain, acute    Effusion, unspecified hand 10/14/2016    Swelling of hand joint    Essential (primary) hypertension 08/19/2021    Benign essential HTN    Gastroparesis 12/05/2019    Gastroparesis    History of falling 08/04/2021    Status post fall    Hyperlipidemia     Hypothyroidism     Localized edema 05/01/2018    Leg edema    Occipital neuralgia 02/10/2017    Occipital neuralgia of right side    Osteomyelitis, unspecified 12/16/2019    Knee osteomyelits, right    Other amnesia 09/01/2020    Memory problem     Other intervertebral disc displacement, lumbar region 2020    Disc displacement, lumbar    Other postherpetic nervous system involvement 2018    Postherpetic neuralgia    Other specified disorders of kidney and ureter 2019    Left renal mass    Personal history of other diseases of the circulatory system 2021    History of carotid artery stenosis    Personal history of other diseases of the digestive system 2019    History of biliary colic    Personal history of other infectious and parasitic diseases     History of scarlet fever    Personal history of other infectious and parasitic diseases 2017    History of herpes zoster    Personal history of other specified conditions 2015    History of right flank pain    Pneumonia, unspecified organism 2019    Left lower lobe pneumonia    Radiculopathy, cervical region 2020    Cervical radiculopathy    Radiculopathy, lumbar region 2020    Lumbar radiculitis    Repeated falls 2018    Recurrent falls    Sleep apnea     Spinal stenosis     cervical, scheduled for surgery 2023    Spondylosis without myelopathy or radiculopathy, cervical region 2017    Spondylosis of cervical region without myelopathy or radiculopathy    Spondylosis without myelopathy or radiculopathy, lumbar region 2020    Lumbar spondylosis    Stricture of artery ([facility]) 2021    Subclavian artery stenosis, right    Trigger finger, right index finger 2021    Trigger index finger of right hand    Trigger finger, right ring finger 2021    Trigger ring finger of right hand    Unspecified asthma, uncomplicated ([facility]) 2020    Asthmatic bronchitis    Vision loss        Past Surgical History:   Procedure Laterality Date    BACK SURGERY  10/12/2017    Back Surgery    BACK SURGERY  2017    Lower Back Surgery    BACK SURGERY  2023    NINA-C5,6,7    CARDIAC CATHETERIZATION       SECTION,  CLASSIC  2013     Section    CHOLECYSTECTOMY      COLONOSCOPY      CT GUIDED PERCUTANEOUS BIOPSY MEDIASTINUM  2017    CT GUIDED PERCUTANEOUS BIOPSY MEDIASTINUM 2017 [facility]    DILATION AND CURETTAGE OF UTERUS  2013    Dilation And Curettage    INSERT / REPLACE / REMOVE PACEMAKER      KNEE ARTHROPLASTY Bilateral     OTHER SURGICAL HISTORY  2019    Knee arthroscopy    SPINE SURGERY      upper c-3 removed, bridge added, and fusion    TONSILLECTOMY  2013    Tonsillectomy    TUBAL LIGATION  2013    Tubal Ligation    UPPER GASTROINTESTINAL ENDOSCOPY         Medication Documentation Review Audit       Reviewed by Shraddha Armas MA (Medical Assistant) on 25 at 0910      Medication Order Taking? Sig Documenting Provider Last Dose Status   acetaminophen (Tylenol) 325 mg suppository [id number] Yes Insert 1 suppository (325 mg) into the rectum every 8 hours if needed for mild pain (1 - 3). Stephanie Restrepo MD  Active   albuterol 90 mcg/actuation inhaler [id number] Yes Inhale 1 puff every 4 hours if needed. Stephanie Restrepo MD Taking Active   aspirin 81 mg capsule [id number] Yes Take 1 tablet by mouth once daily. CHEW AND SWALLOW Stephanie Restrepo MD Taking Active   atorvastatin (Lipitor) 40 mg tablet [id number] Yes TAKE 1 TABLET BY MOUTH ONCE  DAILY January Garcia DO Taking Active   biotin 10 mg tablet [id number] Yes Take 1 tablet (10 mg) by mouth once daily. Stephanie Restrepo MD Taking Active   blood sugar diagnostic (T[employer]etrack Test) strip [id number] Yes TEST twice a day Stephanie Restrepo MD Taking Active   calcium carbonate-vitamin D3 600 mg-20 mcg (800 unit) tablet [id number] Yes Caltrate with Vitamin D3 600 mg (1,500 mg)-800 unit tablet Stephanie Restrepo MD Taking Active   Patient not taking:  Discontinued 25 0921   coenzyme Q-10 200 mg capsule [id number] Yes Take 1 capsule (200 mg) by mouth once daily. Stephanie Restrepo MD Taking  Active   cyanocobalamin (Vitamin B-12) 250 mcg tablet [id number] Yes Take 1 tablet (250 mcg) by mouth once daily. Historical Provider, MD Taking Active   Entresto 24-26 mg tablet [id number] Yes TAKE 1 TABLET BY MOUTH TWICE  DAILY Anthony Maravilla MD Taking Active   escitalopram (Lexapro) 20 mg tablet [id number] Yes TAKE 1 TABLET BY MOUTH ONCE  DAILY January Garcia DO Taking Active   ferrous sulfate 325 (65 Fe) MG tablet [id number] Yes Take 1 tablet (325 mg) by mouth 2 times a day. Historical Provider, MD Taking Active   fexofenadine (Allegra) 180 mg tablet [id number] Yes Take 1 tablet (180 mg) by mouth once daily. Historical Provider, MD Taking Active   fluticasone (Flonase) 50 mcg/actuation nasal spray [id number] Yes Administer 1 spray into affected nostril(s) once daily. Historical Provider, MD Taking Active   folic acid (Folvite) 1 mg tablet [id number] Yes Take 1 tablet (1 mg) by mouth once daily. @0900 Historical Provider, MD Taking Active   furosemide (Lasix) 20 mg tablet [id number] Yes TAKE 1 TABLET BY MOUTH ONCE  DAILY January Garcia DO  Active   gabapentin (Neurontin) 100 mg capsule [id number] Yes Take 1 capsule (100 mg) by mouth 2 times a day. Historical Provider, MD  Active   gabapentin (Neurontin) 300 mg capsule [id number] Yes TAKE 1 CAPSULE BY MOUTH TWICE  DAILY   Patient taking differently: Take 200 mg by mouth 2 times a day.    January Garcia DO Taking Active   hydroxychloroquine (Plaquenil) 200 mg tablet [id number] Yes TAKE 1 TABLET BY MOUTH TWICE  DAILY Pao Perez MD Taking Active   Jardiance 10 mg [id number] Yes TAKE 1 TABLET BY MOUTH DAILY Anthony Maravilla MD Taking Active   levothyroxine (Synthroid, Levoxyl) 100 mcg tablet [id number] Yes TAKE 1 TABLET BY MOUTH ONCE  DAILY January Garcia DO  Active   magnesium oxide (Mag-Ox) 400 mg tablet [id number] Yes Take 2 tablets (800 mg) by mouth once daily. Historical MD Kaye Taking Active   meloxicam (Mobic) 7.5 mg tablet [id number] Yes TAKE  1 TABLET BY MOUTH ONCE  DAILY Pao Perez MD  Active   metFORMIN (Glucophage) 1,000 mg tablet [id number] Yes TAKE 1 TABLET BY MOUTH TWICE  DAILY WITH MEALS January Garcia DO Taking Active   methocarbamol (Robaxin) 500 mg tablet [id number] Yes Take 1-2 tabs every 8 hours as needed for spasms Vonda Sanchez PA-C Taking Active   metoprolol succinate XL (Toprol-XL) 100 mg 24 hr tablet [id number] Yes TAKE 1 TABLET BY MOUTH ONCE  DAILY Anthony Maravilla MD  Active   multivit/folic acid/vit K1 (WOMEN'S 50 PLUS ADVANCED ORAL) [id number] Yes Take 1 tablet by mouth once daily. Historical Provider, MD Taking Active   oxyCODONE-acetaminophen (Percocet) 5-325 mg tablet [id number] Yes Take 1 tablet by mouth every 6 hours if needed for severe pain (7 - 10) for up to 10 days. January Garcia DO  Active   pantoprazole (ProtoNix) 40 mg EC tablet [id number] Yes TAKE 1 TABLET BY MOUTH ONCE  DAILY January Garcia DO  Active                    Allergies   Allergen Reactions    Shellfish Containing Products Anaphylaxis    Codeine Nausea And Vomiting and Other     GI Intolerance    Tramadol Other and Itching     tongue blistered    Celecoxib Rash       Review of Systems   Constitutional:  Negative for chills and fever.   HENT:  Negative for mouth sores, sinus pain and trouble swallowing.    Eyes:  Negative for discharge.   Respiratory:  Negative for shortness of breath and wheezing.    Cardiovascular:  Negative for chest pain.   Musculoskeletal:  Positive for arthralgias and joint swelling.   Skin:  Negative for pallor and rash.   All other systems reviewed and are negative.    Exam   On exam the right shoulder shows mild pain but good active forward flexion over 130 degrees.  She has fairly good cuff strength with resisted external rotation, some mildly positive impingement signs on the right.  Good elbow motion, some mild lateral epicondylar tenderness.  She has swelling globally in the right hand and stiffness with  composite flexion.  No severe distal radius tenderness, some global tenderness throughout the hand and some more tenderness at the base of the right thumb.  Good pulses and sensation in the upper extremity.    Assessment   Encounter Diagnoses   Name Primary?    Right hand pain     Swelling of right hand Yes    Primary osteoarthritis of right hand        Plan   At this point her biggest issue is significant swelling and stiffness in the digits with her significant arthritis in the hand.  We will get her into hand therapy to work on motion and edema control.  Shoulder and elbow pain should continue to slowly improve and we did discuss possible injections in the future if symptoms do not continue to improve.  She can follow-up with us as needed.    I, Clare Dee PA-C, am acting as a scribe and attest that this documentation has been prepared under the direction and in the presence of Beau Shrestha MD.    By signing below, I, Beau Shrestha MD, personally performed the services described in this documentation. All medical record entries made by the scribe were at my direction and in my presence. I have reviewed the chart and agree that the record reflects my personal performance and is accurate and complete.

## 2025-02-11 NOTE — TELEPHONE ENCOUNTER
James with [facility] for hand pain and swelling.  Ref to OT.  He also recommended she ask you for a dose jake, thinking that may work better that daily prednisone.  Leaving for vacation on March 5th and hoping to take before leaving.  Please advise.

## 2025-02-17 ENCOUNTER — TELEPHONE (OUTPATIENT)
Dept: CARDIOLOGY | Facility: CLINIC | Age: 75
End: 2025-02-17

## 2025-02-17 ENCOUNTER — APPOINTMENT (OUTPATIENT)
Dept: PHARMACY | Facility: HOSPITAL | Age: 75
End: 2025-02-17
Payer: MEDICARE

## 2025-02-17 ENCOUNTER — HOSPITAL ENCOUNTER (OUTPATIENT)
Dept: CARDIOLOGY | Facility: CLINIC | Age: 75
Discharge: HOME | End: 2025-02-17
Payer: MEDICARE

## 2025-02-17 DIAGNOSIS — I50.9 CONGESTIVE HEART FAILURE, UNSPECIFIED HF CHRONICITY, UNSPECIFIED HEART FAILURE TYPE: ICD-10-CM

## 2025-02-17 DIAGNOSIS — E78.5 HYPERLIPIDEMIA, UNSPECIFIED HYPERLIPIDEMIA TYPE: ICD-10-CM

## 2025-02-17 DIAGNOSIS — E11.8 CONTROLLED TYPE 2 DIABETES MELLITUS WITH COMPLICATION, WITHOUT LONG-TERM CURRENT USE OF INSULIN (MULTI): ICD-10-CM

## 2025-02-17 DIAGNOSIS — I15.9 SECONDARY HYPERTENSION: ICD-10-CM

## 2025-02-17 DIAGNOSIS — I49.02 VENTRICULAR FLUTTER (MULTI): ICD-10-CM

## 2025-02-17 DIAGNOSIS — I42.9 CARDIOMYOPATHY, UNSPECIFIED TYPE (MULTI): Primary | ICD-10-CM

## 2025-02-17 PROCEDURE — 93296 REM INTERROG EVL PM/IDS: CPT

## 2025-02-17 RX ORDER — SACUBITRIL AND VALSARTAN 24; 26 MG/1; MG/1
1 TABLET, FILM COATED ORAL 2 TIMES DAILY
Qty: 180 TABLET | Refills: 3 | Status: SHIPPED | OUTPATIENT
Start: 2025-02-17

## 2025-02-17 NOTE — Clinical Note
Abisai Ramachandran, No symptoms of worsening heart failure reported at today's appointment. Joana reports doing well on Entresto and Jardiance with no side effects. Will apply for  [employer] for cost assistance. She also mentioned starting a GLP-1 as she has recently seen commercials regarding cardiac and diabetic benefit. Recommended she discuss with you or her PCP if this would be appropriate. Will follow up in 1 month.

## 2025-02-17 NOTE — PROGRESS NOTES
I reviewed the progress note and agree with the resident's findings and plans as written. Case discussed with resident.    Bertha Hartley, PharmD  [phone]

## 2025-02-17 NOTE — ASSESSMENT & PLAN NOTE
Continue 3/4 GDMT. Will apply for [facility] P[facility] for Entresto and Jardiance copay assistance. Consider a MRA in the future to complete GDMT.

## 2025-02-19 DIAGNOSIS — M48.02 CERVICAL STENOSIS OF SPINE: ICD-10-CM

## 2025-02-19 RX ORDER — MELOXICAM 7.5 MG/1
7.5 TABLET ORAL DAILY
Qty: 90 TABLET | Refills: 0 | Status: CANCELLED | OUTPATIENT
Start: 2025-02-19

## 2025-02-20 ENCOUNTER — EVALUATION (OUTPATIENT)
Dept: OCCUPATIONAL THERAPY | Facility: CLINIC | Age: 75
End: 2025-02-20
Payer: MEDICARE

## 2025-02-20 DIAGNOSIS — M79.641 HAND PAIN, RIGHT: ICD-10-CM

## 2025-02-20 DIAGNOSIS — M19.041 PRIMARY OSTEOARTHRITIS OF RIGHT HAND: ICD-10-CM

## 2025-02-20 DIAGNOSIS — M79.89 SWELLING OF RIGHT HAND: ICD-10-CM

## 2025-02-20 DIAGNOSIS — M25.641 JOINT STIFFNESS OF HAND, RIGHT: Primary | ICD-10-CM

## 2025-02-20 PROCEDURE — 97165 OT EVAL LOW COMPLEX 30 MIN: CPT | Mod: GO

## 2025-02-20 PROCEDURE — 97110 THERAPEUTIC EXERCISES: CPT | Mod: GO

## 2025-02-20 ASSESSMENT — PATIENT HEALTH QUESTIONNAIRE - PHQ9
2. FEELING DOWN, DEPRESSED OR HOPELESS: NOT AT ALL
SUM OF ALL RESPONSES TO PHQ9 QUESTIONS 1 AND 2: 0
1. LITTLE INTEREST OR PLEASURE IN DOING THINGS: NOT AT ALL

## 2025-02-20 ASSESSMENT — PAIN - FUNCTIONAL ASSESSMENT: PAIN_FUNCTIONAL_ASSESSMENT: 0-10

## 2025-02-20 ASSESSMENT — PAIN SCALES - GENERAL: PAINLEVEL_OUTOF10: 2

## 2025-02-20 NOTE — PROGRESS NOTES
Patient Name: Joana Elizabeth  MRN: [id number]  Today's Date: 2/20/2025  Time Calculation  Start Time: 1405  Stop Time: 1445  Time Calculation (min): 40 min      Assessment:Pt demonstrates limited ROM and strength in right thumb and wrist. This is resulting in limited participation in pain-free ADLs and inability to perform at the prior level of function. Patient will benefit from skilled Occupational Therapy services to address impairments noted in eval.        Plan: Planned Interventions include: therapeutic exercise, therapeutic activity, self-care home management, manual therapy, therapeutic activities, neuromuscular coordination, electric stimulation, ultrasound, kinesiotaping,and IASTM.  Outpatient Plan  Frequency: 1-2  Duration: 8 weeks  Insurance:  Visit number: 1 of 1  Authorization info: Medical necessity  Insurance Type: Medicare    Subjective:   Chief Complaint: Significant hand swelling s/p fall 5 weeks ago with severe underlying arthritis.   Onset: 2-11-25  DOMINIQUE: Fall   DOI/DOS: 1-2-25  Current Problem  1. Joint stiffness of hand, right  Follow Up In Occupational Therapy      2. Hand pain, right  Follow Up In Occupational Therapy      3. Swelling of right hand  Referral to Occupational Therapy      4. Primary osteoarthritis of right hand  Referral to Occupational Therapy          General:  General  Reason for Referral: R wrist sprain  Referred By: Dr. Shrestha, Clare Dee  General Comment: Visit 1, no auth     Precautions: N/A     Medical History Form: Reviewed (scanned into chart)    Prior Functional Level    Prior Function Per Pt/Caregiver Report  Hand Dominance: Right     Current Functional level    ADL  ADL Comments: Pt reports painful pulling pants up.    Home Set-Up  Home Living  Type of Home: House  Lives With: Spouse  Home Access: No concerns    Sensation  Sensation  Sensation Comment: No numbness     PAIN  Pain Assessment  Pain Assessment: 0-10  0-10 (Numeric) Pain Score: 2  Pain Type: Chronic  pain  Pain Location: Hand  Pain Orientation: Right       Functional Rating Scale  Quick Dash: 52.27      Wrist AROM  R wrist flexion: (70°): 41  L wrist flexion: (70°): 61  R wrist extension: (70°): 62  L wrist extension: (70°): 71    Hand Assessment  Min swelling to R dorsal palm.   Mass grasp WFL bilaterally.   Strength not assessed due to limited ROM.     Right Hand AROM:  R Thumb MCP 0-60 (degrees): 51 Degrees  R Thumb IP 0-80 (degrees): 35 Degrees  R Thumb Radial ABduction/ADduction 0-55 (degrees): 29  R Thumb Palmar ABduction/ADduction 0-45 (degrees): 35  Left Hand AROM:  L Thumb MCP 0-60 (degrees): 61 Degrees  L Thumb IP 0-80 (degrees): 60 Degrees  L Thumb Radial ADduction/ABduction 0-55 (degrees): 50  L Thumb Palmar ADduction/ABduction 0-45 (degrees): 45       Therapeutic Exercise:   min  19 MINUTES  Therapeutic Exercise  Therapeutic Exercise Activity 1: Wrist flexion x 10  Therapeutic Exercise Activity 2: Wrist ext x 10  Therapeutic Exercise Activity 3: Real abd x 10  Therapeutic Exercise Activity 4: Radial abd x 10        Education  Education  Individual(s) Educated: Patient  Education Provided: POC discussed and agreed upon, Risk and benefits of OT discussed with patient or other, Orthotics wearing schedule and precautions, Orthotics and/or prosthetic trainging  Home Program: PROM, AROM, Handout issued, Tendon gliding, Strengthening  Risk and Benefits Discussed with Patient/Caregiver/Other: yes  Patient/Caregiver Demonstrated Understanding: yes  Plan of Care Discussed and Agreed Upon: yes  Patient Response to Education: Patient/Caregiver Verbalized Understanding of Information, Patient/Caregiver Performed Return Demonstration of Exercises/Activities, Patient/Caregiver Asked Appropriate Questions       Jacobo Hayden, OT        Active       OT Goals       Pt will demonstrate in improve in function by improving to 20.0 on the quick dash to signify increased independence with ADL'S with decreased pain.          Start:  02/20/25    Expected End:  08/20/25            Patient will improve thumb  ROM in order to perform self-care, household, work and or leisure tasks, Right Thumb ROM; MP 60   Degrees, IP   60    Degrees, Radial Abduction  45       Degrees, Real Abduction  45  Degrees.         Start:  02/20/25    Expected End:  08/20/25            Patient will improve hand strength in order to perform self-care, household, work and or leisure tasks Right  Hand Strength Goal;       35    #, Key Pinch     8    #,       Start:  02/20/25    Expected End:  08/20/25            Patient will improve strengthening in order to perform self-care, household, work and or leisure tasks, Right Wrist Strength Goal   Extension   /5,  Flexion    /5, Ulnar deviation /5, Radial deviation   /5  4+/5 globally       Start:  02/20/25    Expected End:  08/20/25            Patient will improve wrist range of motion in order to perform self-care, household, work and or leisure tasks. Wrist Extension 65,             Flexion 60,                     Start:  02/20/25    Expected End:  08/20/25               OT Problem       PATIENT WILL DEMONSTRATE INDEPENDENCE IN HOME PROGRAM FOR SUPPORT OF PROGRESSION       Start:  02/20/25    Expected End:  08/20/25            PATIENT WILL REPORT PAIN OF 0-2/10 DEMONSTRATING A REDUCTION OF OVERALL PAIN       Start:  02/20/25    Expected End:  08/20/25

## 2025-02-24 ENCOUNTER — TELEPHONE (OUTPATIENT)
Dept: PHARMACY | Facility: HOSPITAL | Age: 75
End: 2025-02-24
Payer: MEDICARE

## 2025-02-24 PROCEDURE — RXMED WILLOW AMBULATORY MEDICATION CHARGE

## 2025-02-24 NOTE — TELEPHONE ENCOUNTER
Patient Assistance Program Approval:     We are pleased to inform you that your application for assistance has been approved.     This approval is valid through  2/24/2026  as long as the following criteria continue to be satisfied:     Your medication (Entresto and Jardiance) remains covered under your current insurance plan.   Your prescriber does not discontinue therapy.   You do not seek reimbursement from any other private or government-funded programs for the  medication.    Under this program, the pharmacy will first bill your insurance plan for your indemnified specified medication. The [employer] Assistance Fund will then offset your copay balance, so that your out-of pocket expense for your specialty medication will be $0.00.    Suzi Alfaro, PharmD  PGY-1 Pharmacy Resident

## 2025-02-24 NOTE — TELEPHONE ENCOUNTER
I reviewed the progress note and agree with the resident's findings and plans as written. Case discussed with resident.     Bertha Hartley, FernandaD

## 2025-02-25 ENCOUNTER — TREATMENT (OUTPATIENT)
Dept: OCCUPATIONAL THERAPY | Facility: CLINIC | Age: 75
End: 2025-02-25
Payer: MEDICARE

## 2025-02-25 DIAGNOSIS — M25.641 JOINT STIFFNESS OF HAND, RIGHT: ICD-10-CM

## 2025-02-25 DIAGNOSIS — M79.641 HAND PAIN, RIGHT: ICD-10-CM

## 2025-02-25 PROCEDURE — 97110 THERAPEUTIC EXERCISES: CPT | Mod: GO

## 2025-02-25 PROCEDURE — 97035 APP MDLTY 1+ULTRASOUND EA 15: CPT | Mod: GO

## 2025-02-25 NOTE — PROGRESS NOTES
Occupational Therapy  Occupational Therapy Treatment    Patient Name: Joana Elizabeth  MRN: [id number]  Today's Date: 2/25/2025  Time Calculation  Start Time: 1405  Stop Time: 1445  Time Calculation (min): 40 min    Visit # 2       General Comment: Visit 2, no auth    Assessment: Tissue mobility increased , Joint mobility/ROM increased, flexibility increased, muscle fatigue increased.  Pt completed session with some difficulty.           Plan: Progress with POC, As tolerated and monitor home program. Add light t-putty next session.        Current Problem  1. Joint stiffness of hand, right  Follow Up In Occupational Therapy      2. Hand pain, right  Follow Up In Occupational Therapy          Precautions N/A       Subjective:   Patient reports I am getting to use my hand more and more.     Pain   0/10    Performing HEP?: Yes    Objective:   All exercises held for 10 seconds unless noted otherwise   Treatments:   This therapist instructed and demonstrated interventions to patient, patient completed the following under direct supervision of this therapist:  Modalities:        8MINUTES  3.3 MHz .8 W c/m2 for 8 minutes to in preparation for ROM/strength exercises  to dorsal thumb.       Therapeutic Exercise:   min  32 MINUTES  Therapeutic Exercise  Therapeutic Exercise Activity 3: Real abd x 5  Therapeutic Exercise Activity 4: Radial abd x 5  Therapeutic Exercise Activity 5: Thumb IP flexion with MP passively extended x 10  Therapeutic Exercise Activity 6: Thumb opposition to 3-5 digit. x 10  Therapeutic Exercise Activity 8: Thumb isometrics 5 x 4 positions        Education: Pt educated on thumb isometrics.       Jacobo Hayden, OT, CHT    Active       OT Goals       Pt will demonstrate in improve in function by improving to 20.0 on the quick dash to signify increased independence with ADL'S with decreased pain.         Start:  02/20/25    Expected End:  08/20/25            Patient will improve thumb  ROM in order to  perform self-care, household, work and or leisure tasks, Right Thumb ROM; MP 60   Degrees, IP   60    Degrees, Radial Abduction  45       Degrees, Real Abduction  45  Degrees.         Start:  02/20/25    Expected End:  08/20/25            Patient will improve hand strength in order to perform self-care, household, work and or leisure tasks Right  Hand Strength Goal;       35    #, Key Pinch     8    #,       Start:  02/20/25    Expected End:  08/20/25            Patient will improve strengthening in order to perform self-care, household, work and or leisure tasks, Right Wrist Strength Goal   Extension   /5,  Flexion    /5, Ulnar deviation /5, Radial deviation   /5  4+/5 globally       Start:  02/20/25    Expected End:  08/20/25            Patient will improve wrist range of motion in order to perform self-care, household, work and or leisure tasks. Wrist Extension 65,             Flexion 60,                     Start:  02/20/25    Expected End:  08/20/25               OT Problem       PATIENT WILL DEMONSTRATE INDEPENDENCE IN HOME PROGRAM FOR SUPPORT OF PROGRESSION       Start:  02/20/25    Expected End:  08/20/25            PATIENT WILL REPORT PAIN OF 0-2/10 DEMONSTRATING A REDUCTION OF OVERALL PAIN       Start:  02/20/25    Expected End:  08/20/25

## 2025-02-27 ENCOUNTER — PHARMACY VISIT (OUTPATIENT)
Dept: PHARMACY | Facility: CLINIC | Age: 75
End: 2025-02-27
Payer: COMMERCIAL

## 2025-02-28 ENCOUNTER — TREATMENT (OUTPATIENT)
Dept: OCCUPATIONAL THERAPY | Facility: CLINIC | Age: 75
End: 2025-02-28
Payer: MEDICARE

## 2025-02-28 DIAGNOSIS — M25.641 JOINT STIFFNESS OF HAND, RIGHT: ICD-10-CM

## 2025-02-28 DIAGNOSIS — M79.641 HAND PAIN, RIGHT: ICD-10-CM

## 2025-02-28 PROCEDURE — 97035 APP MDLTY 1+ULTRASOUND EA 15: CPT | Mod: GO,CO

## 2025-02-28 PROCEDURE — 97110 THERAPEUTIC EXERCISES: CPT | Mod: GO,CO

## 2025-02-28 PROCEDURE — 97140 MANUAL THERAPY 1/> REGIONS: CPT | Mod: GO,CO

## 2025-02-28 ASSESSMENT — PAIN SCALES - GENERAL: PAINLEVEL_OUTOF10: 2

## 2025-02-28 ASSESSMENT — PAIN - FUNCTIONAL ASSESSMENT: PAIN_FUNCTIONAL_ASSESSMENT: 0-10

## 2025-02-28 NOTE — PROGRESS NOTES
"    Occupational Therapy  Occupational Therapy Treatment    Patient Name: Joana Elizabeth  MRN: [id number]  Today's Date: 2/28/2025  Time Calculation  Start Time: 1315  Stop Time: 1400  Time Calculation (min): 45 min    Insurance:  Visit number: 3  Authorization info: None  Insurance Type: MCR  OT Last Visit  OT Received On: 02/28/25    General  Reason for visit:   General  Reason for Referral: R wrist sprain  Referred By: Clare Ceja  General Comment: Visit 3, no auth    Current Problem  1. Joint stiffness of hand, right  Follow Up In Occupational Therapy      2. Hand pain, right  Follow Up In Occupational Therapy          Precautions: Falls risk       Medical History Form: medical History Reviewed (scanned into chart)    Subjective:   Chief Complaint: \"My hand is stiff and I can't use my Deoderant spray yet.\"    Performing HEP?: Yes    Pain  Pain Assessment: 0-10  0-10 (Numeric) Pain Score: 2  Pain Type: Chronic pain  Pain Location: Hand  Pain Orientation: Right  Patient's Stated Pain Goal: No pain  Pain Interventions: Heat applied, Ambulation/increased activity (warm water sponge squeeze and retrograde massage and mobs.)  Response to Interventions: Relief    Objective:   Physical Observation:   Measurements:    Treatments:   This therapist instructed and demonstrated interventions to patient, patient completed the following under direct supervision of this therapist:  Modalities:        8 Min  Modalities  Modalities Used: Yes  Modality 1: Timed Ultrasound (3.3 MHz .8 W c/m2 for 8 minutes to in preparation for ROM/strength exercises  to dorsal thumb.)    Therapeutic Exercise:   min  17 MINUTES  Therapeutic Exercise  Therapeutic Exercise Activity 1: Wrist flexion x 10  Therapeutic Exercise Activity 2: Wrist ext x 10  Therapeutic Exercise Activity 3: Real abd x 10  Therapeutic Exercise Activity 4: Radial abd x 10  Therapeutic Exercise Activity 5: Thumb IP flexion with MP passively extended x " "10  Therapeutic Exercise Activity 6: Thumb opposition to 3-5 digit. x 10  Therapeutic Exercise Activity 7: Thumb circumduction forward and reverse 10x each  Therapeutic Exercise Activity 8: Thumb isometrics 10 x 4 positions  Therapeutic Exercise Activity 9: UD x10 ea  Therapeutic Exercise Activity 10: RD x10 ea  Therapeutic Exercise Activity 11: Isometric gross fist and isometric \"C\" 10 for 5 count.    Manual Therapy:       20 MINUTES  Manual Therapy  Manual Therapy Performed: Yes  Manual Therapy Activity 1: Distration thumb longitudinal traction with 20 second hold x5.  Manual Therapy Activity 2: Soft tissue mobilization palm, thenar region and web space, and wrist and digits in retrograde position.  Manual Therapy Activity 3: Retrograde massage and edema management with gravity assist.    Assessment: Tissue mobility increased, Joint mobility/ROM increased, flexibility increased, muscle fatigue increased.  Pt completed session with some difficulty.       Plan: Progress with POC, As tolerated and monitor home program. Add light t-putty next session.      Education: Added warm water sponge squeeze, Isometric \"C\", and gross fist with isometric grasp and hold to HEP.    Active       OT Goals       Pt will demonstrate in improve in function by improving to 20.0 on the quick dash to signify increased independence with ADL'S with decreased pain.         Start:  02/20/25    Expected End:  08/20/25            Patient will improve thumb  ROM in order to perform self-care, household, work and or leisure tasks, Right Thumb ROM; MP 60   Degrees, IP   60    Degrees, Radial Abduction  45       Degrees, Real Abduction  45  Degrees.         Start:  02/20/25    Expected End:  08/20/25            Patient will improve hand strength in order to perform self-care, household, work and or leisure tasks Right  Hand Strength Goal;       35    #, Key Pinch     8    #,       Start:  02/20/25    Expected End:  08/20/25            Patient " will improve strengthening in order to perform self-care, household, work and or leisure tasks, Right Wrist Strength Goal   Extension   /5,  Flexion    /5, Ulnar deviation /5, Radial deviation   /5  4+/5 globally       Start:  02/20/25    Expected End:  08/20/25            Patient will improve wrist range of motion in order to perform self-care, household, work and or leisure tasks. Wrist Extension 65,             Flexion 60,                     Start:  02/20/25    Expected End:  08/20/25               OT Problem       PATIENT WILL DEMONSTRATE INDEPENDENCE IN HOME PROGRAM FOR SUPPORT OF PROGRESSION       Start:  02/20/25    Expected End:  08/20/25            PATIENT WILL REPORT PAIN OF 0-2/10 DEMONSTRATING A REDUCTION OF OVERALL PAIN       Start:  02/20/25    Expected End:  08/20/25              KAE Menon/DALE

## 2025-03-17 ENCOUNTER — APPOINTMENT (OUTPATIENT)
Dept: PHARMACY | Facility: HOSPITAL | Age: 75
End: 2025-03-17
Payer: MEDICARE

## 2025-03-17 DIAGNOSIS — I42.9 CARDIOMYOPATHY, UNSPECIFIED TYPE (MULTI): Primary | ICD-10-CM

## 2025-03-17 DIAGNOSIS — I50.9 CONGESTIVE HEART FAILURE, UNSPECIFIED HF CHRONICITY, UNSPECIFIED HEART FAILURE TYPE: ICD-10-CM

## 2025-03-17 NOTE — Clinical Note
Abisai Ramachandran, Today's appointment was 1 month follow up regarding heart failure management. Joana reports doing well on Entresto and Jardiance with no side effects. She mentions checking blood pressure every day in which it has been at goal.  She will be discussing GLP-1 therapy with her PCP tomorrow as she is interested this therapy, due to diarrhea with metformin.  No changes today, will follow up in 3 months.

## 2025-03-17 NOTE — PROGRESS NOTES
Pharmacist Clinic: Cardiology Management    Joana Elizabeth is a 75 y.o. female was referred to Clinical Pharmacy Team for heart failure management.     Referring Provider: Madie Augustine APRN*    THIS IS A NEW PATIENT APPOINTMENT. PATIENT WILL BE ESTABLISHING CARE WITH CLINICAL PHARMACY.    Appointment was completed by Joana Elizabeth who was reached at [phone].    REVIEW OF PAST APPNT (IF APPLICABLE):   Today's appointment was initial visit to establish care with Clinical Pharmacy. Joana reports doing well on Entresto and Jardiance with no side effects. She reports that both medications together cost around $1,000 every 3 months. Also, patient mentioned starting a GLP-1 as she saw commercials that stated it helps with heart, blood sugars and weight loss. Recommended patient to contact PCP or cardiologist for approval.    Allergies Reviewed? Yes    Allergies   Allergen Reactions    Shellfish Containing Products Anaphylaxis    Codeine Nausea And Vomiting and Other     GI Intolerance    Tramadol Other and Itching     tongue blistered    Celecoxib Rash       Past Medical History:   Diagnosis Date    Abnormal levels of other serum enzymes 12/01/2020    Abnormal AST and ALT    Anemia, unspecified 12/28/2018    Postoperative anemia    Calculus of gallbladder without cholecystitis without obstruction 06/26/2019    Gallstones    Cardiomyopathy     s/p ICD, last seen by Dr. Maravilla on 05/14/2023    Cataract     Chronic maxillary sinusitis 03/22/2018    Right maxillary sinusitis    COVID-19 10/02/2020    COVID-19 virus infection    Depression     Diabetes mellitus (Multi)     Dorsalgia, unspecified 10/15/2019    Mid-back pain, acute    Effusion, unspecified hand 10/14/2016    Swelling of hand joint    Essential (primary) hypertension 08/19/2021    Benign essential HTN    Gastroparesis 12/05/2019    Gastroparesis    History of falling 08/04/2021    Status post fall    Hyperlipidemia     Hypothyroidism     Localized  edema 05/01/2018    Leg edema    Occipital neuralgia 02/10/2017    Occipital neuralgia of right side    Osteomyelitis, unspecified 12/16/2019    Knee osteomyelits, right    Other amnesia 09/01/2020    Memory problem    Other intervertebral disc displacement, lumbar region 04/23/2020    Disc displacement, lumbar    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    Postherpetic neuralgia    Other specified disorders of kidney and ureter 05/30/2019    Left renal mass    Personal history of other diseases of the circulatory system 08/03/2021    History of carotid artery stenosis    Personal history of other diseases of the digestive system 06/26/2019    History of biliary colic    Personal history of other infectious and parasitic diseases     History of scarlet fever    Personal history of other infectious and parasitic diseases 08/21/2017    History of herpes zoster    Personal history of other specified conditions 11/25/2015    History of right flank pain    Pneumonia, unspecified organism 03/29/2019    Left lower lobe pneumonia    Radiculopathy, cervical region 02/17/2020    Cervical radiculopathy    Radiculopathy, lumbar region 02/17/2020    Lumbar radiculitis    Repeated falls 04/17/2018    Recurrent falls    Sleep apnea     Spinal stenosis     cervical, scheduled for surgery 11/14/2023    Spondylosis without myelopathy or radiculopathy, cervical region 08/01/2017    Spondylosis of cervical region without myelopathy or radiculopathy    Spondylosis without myelopathy or radiculopathy, lumbar region 02/17/2020    Lumbar spondylosis    Stricture of artery (CMS-[facility]) 08/03/2021    Subclavian artery stenosis, right    Trigger finger, right index finger 07/30/2021    Trigger index finger of right hand    Trigger finger, right ring finger 07/30/2021    Trigger ring finger of right hand    Unspecified asthma, uncomplicated ([facility]) 03/19/2020    Asthmatic bronchitis    Vision loss        Current Outpatient  Medications on File Prior to Visit   Medication Sig Dispense Refill    acetaminophen (Tylenol) 325 mg suppository Insert 1 suppository (325 mg) into the rectum every 8 hours if needed for mild pain (1 - 3). (Patient not taking: Reported on 2/17/2025)      albuterol 90 mcg/actuation inhaler Inhale 1 puff every 4 hours if needed.      aspirin 81 mg capsule Take 1 tablet by mouth once daily. CHEW AND SWALLOW      atorvastatin (Lipitor) 40 mg tablet TAKE 1 TABLET BY MOUTH ONCE  DAILY 90 tablet 3    biotin 10 mg tablet Take 1 tablet (10 mg) by mouth once daily.      blood sugar diagnostic (Truetrack Test) strip TEST twice a day      calcium carbonate-vitamin D3 600 mg-20 mcg (800 unit) tablet Caltrate with Vitamin D3 600 mg (1,500 mg)-800 unit tablet      coenzyme Q-10 200 mg capsule Take 1 capsule (200 mg) by mouth once daily.      cyanocobalamin (Vitamin B-12) 250 mcg tablet Take 1 tablet (250 mcg) by mouth once daily.      empagliflozin (Jardiance) 10 mg Take 1 tablet (10 mg) by mouth once daily. 90 tablet 3    escitalopram (Lexapro) 20 mg tablet TAKE 1 TABLET BY MOUTH ONCE  DAILY 90 tablet 3    ferrous sulfate 325 (65 Fe) MG tablet Take 1 tablet (325 mg) by mouth 2 times a day.      fexofenadine (Allegra) 180 mg tablet Take 1 tablet (180 mg) by mouth once daily.      fluticasone (Flonase) 50 mcg/actuation nasal spray Administer 1 spray into affected nostril(s) once daily.      folic acid (Folvite) 1 mg tablet Take 1 tablet (1 mg) by mouth once daily. @0900      furosemide (Lasix) 20 mg tablet TAKE 1 TABLET BY MOUTH ONCE  DAILY 90 tablet 3    gabapentin (Neurontin) 100 mg capsule Take 1 capsule (100 mg) by mouth 2 times a day.      gabapentin (Neurontin) 300 mg capsule TAKE 1 CAPSULE BY MOUTH TWICE  DAILY (Patient taking differently: Take 200 mg by mouth 2 times a day.) 180 capsule 3    hydroxychloroquine (Plaquenil) 200 mg tablet TAKE 1 TABLET BY MOUTH TWICE  DAILY 180 tablet 2    levothyroxine (Synthroid, Levoxyl)  100 mcg tablet TAKE 1 TABLET BY MOUTH ONCE  DAILY 90 tablet 3    magnesium oxide (Mag-Ox) 400 mg tablet Take 2 tablets (800 mg) by mouth once daily.      meloxicam (Mobic) 7.5 mg tablet TAKE 1 TABLET BY MOUTH ONCE  DAILY 90 tablet 3    metFORMIN (Glucophage) 1,000 mg tablet TAKE 1 TABLET BY MOUTH TWICE  DAILY WITH MEALS 180 tablet 3    methocarbamol (Robaxin) 500 mg tablet Take 1-2 tabs every 8 hours as needed for spasms 60 tablet 2    metoprolol succinate XL (Toprol-XL) 100 mg 24 hr tablet TAKE 1 TABLET BY MOUTH ONCE  DAILY 90 tablet 3    multivit/folic acid/vit K1 (WOMEN'S 50 PLUS ADVANCED ORAL) Take 1 tablet by mouth once daily.      pantoprazole (ProtoNix) 40 mg EC tablet TAKE 1 TABLET BY MOUTH ONCE  DAILY 90 tablet 3    sacubitriL-valsartan (Entresto) 24-26 mg tablet Take 1 tablet by mouth 2 times a day. 180 tablet 3     No current facility-administered medications on file prior to visit.         RELEVANT LAB RESULTS:  Lab Results   Component Value Date    BILITOT 0.5 08/23/2024    CALCIUM 9.5 08/23/2024    CO2 26 08/23/2024     08/23/2024    CREATININE 0.93 08/23/2024    GLUCOSE 186 (H) 08/23/2024    ALKPHOS 49 08/23/2024    K 4.3 08/23/2024    PROT 6.5 08/23/2024     08/23/2024    AST 15 08/23/2024    ALT 14 08/23/2024    BUN 30 (H) 08/23/2024    ANIONGAP 16 08/23/2024    MG 1.93 06/26/2019    PHOS 4.1 06/26/2019    ALBUMIN 4.1 08/23/2024    LIPASE 26 06/11/2018    GFRF 69 01/18/2023     Lab Results   Component Value Date    TRIG 479 (H) 08/23/2024    CHOL 163 08/23/2024    LDLCALC  08/23/2024      Comment:      The calculation of LDL and VLDL are inaccurate when the Triglycerides are greater than 400 mg/dL or when the patient is non-fasting. If LDL measurement is necessary contact the testing laboratory for an alternative LDL assay.                                  Near   Borderline      AGE      Desirable  Optimal    High     High     Very High     0-19 Y     0 - 109     ---    110-129   >/= 130  "    ----    20-24 Y     0 - 119     ---    120-159   >/= 160     ----      >24 Y     0 -  99   100-129  130-159   160-189     >/=190      HDL 35.8 08/23/2024     No results found for: \"BMCBC\", \"CBCDIF\"     PHARMACEUTICAL ASSESSMENT:    MEDICATION RECONCILIATION    Home Pharmacy Reviewed? Yes, describe: [facility] Jardiance and Entresto    Drug Interactions? No    Medication Documentation Review Audit       Reviewed by Fernanda TurciosD (Pharmacist) on 02/17/25 at 1302      Medication Order Taking? Sig Documenting Provider Last Dose Status   acetaminophen (Tylenol) 325 mg suppository [id number]  Insert 1 suppository (325 mg) into the rectum every 8 hours if needed for mild pain (1 - 3).   Patient not taking: Reported on 2/17/2025    Historical MD Kaye  Active   albuterol 90 mcg/actuation inhaler [id number] Yes Inhale 1 puff every 4 hours if needed. Stephanie Restrepo MD Taking Active   aspirin 81 mg capsule [id number] Yes Take 1 tablet by mouth once daily. CHEW AND SWALLOW Stephanie Restrepo MD Taking Active   atorvastatin (Lipitor) 40 mg tablet [id number] Yes TAKE 1 TABLET BY MOUTH ONCE  DAILY January Garcia DO Taking Active   biotin 10 mg tablet [id number] Yes Take 1 tablet (10 mg) by mouth once daily. Stephanie Restrepo MD Taking Active   blood sugar diagnostic (Truetrack Test) strip [id number] No TEST twice a day Stephanie Restrepo MD Taking Active   calcium carbonate-vitamin D3 600 mg-20 mcg (800 unit) tablet [id number] Yes Caltrate with Vitamin D3 600 mg (1,500 mg)-800 unit tablet Stephanie Restrepo MD Taking Active   coenzyme Q-10 200 mg capsule [id number] Yes Take 1 capsule (200 mg) by mouth once daily. Stephanie Restrepo MD Taking Active   cyanocobalamin (Vitamin B-12) 250 mcg tablet [id number] Yes Take 1 tablet (250 mcg) by mouth once daily. Stephanie Restrepo MD Taking Active   Entresto 24-26 mg tablet [id number] Yes TAKE 1 TABLET BY MOUTH TWICE  DAILY Anthony AVITIA" MD Taiwo Taking Active   escitalopram (Lexapro) 20 mg tablet [id number] Yes TAKE 1 TABLET BY MOUTH ONCE  DAILY January Garcia DO Taking Active   ferrous sulfate 325 (65 Fe) MG tablet [id number] Yes Take 1 tablet (325 mg) by mouth 2 times a day. Historical Provider, MD Taking Active   fexofenadine (Allegra) 180 mg tablet [id number] Yes Take 1 tablet (180 mg) by mouth once daily. Historical Provider, MD Taking Active   fluticasone (Flonase) 50 mcg/actuation nasal spray [id number] Yes Administer 1 spray into affected nostril(s) once daily. Historical Provider, MD Taking Active   folic acid (Folvite) 1 mg tablet [id number] Yes Take 1 tablet (1 mg) by mouth once daily. @0900 Stephanie Restrepo MD Taking Active   furosemide (Lasix) 20 mg tablet [id number] Yes TAKE 1 TABLET BY MOUTH ONCE  DAILY January Garcia DO  Active   gabapentin (Neurontin) 100 mg capsule [id number]  Take 1 capsule (100 mg) by mouth 2 times a day. Historical Provider, MD  Active   gabapentin (Neurontin) 300 mg capsule [id number] No TAKE 1 CAPSULE BY MOUTH TWICE  DAILY   Patient taking differently: Take 200 mg by mouth 2 times a day.    January Garcia DO Taking Active   Discontinued 02/11/25 1448   hydroxychloroquine (Plaquenil) 200 mg tablet [id number] Yes TAKE 1 TABLET BY MOUTH TWICE  DAILY Pao Perez MD  Active   Jardiance 10 mg [id number] Yes TAKE 1 TABLET BY MOUTH DAILY Anthony Maravilla MD Taking Active   levothyroxine (Synthroid, Levoxyl) 100 mcg tablet [id number] Yes TAKE 1 TABLET BY MOUTH ONCE  DAILY January Garcia DO  Active   magnesium oxide (Mag-Ox) 400 mg tablet [id number] Yes Take 2 tablets (800 mg) by mouth once daily. Stephanie Restrepo MD Taking Active   meloxicam (Mobic) 7.5 mg tablet [id number] Yes TAKE 1 TABLET BY MOUTH ONCE  DAILY Pao Perez MD  Active   metFORMIN (Glucophage) 1,000 mg tablet [id number] Yes TAKE 1 TABLET BY MOUTH TWICE  DAILY WITH MEALS January Garcia DO Taking Active   methocarbamol  "(Robaxin) 500 mg tablet [id number] Yes Take 1-2 tabs every 8 hours as needed for spasms Vonda Sanchez PA-C Taking Active   metoprolol succinate XL (Toprol-XL) 100 mg 24 hr tablet [id number] Yes TAKE 1 TABLET BY MOUTH ONCE  DAILY Anthony Maravilla MD  Active   multivit/folic acid/vit K1 (WOMEN'S 50 PLUS ADVANCED ORAL) [id number] Yes Take 1 tablet by mouth once daily. Historical Provider, MD Taking Active   oxyCODONE-acetaminophen (Percocet) 5-325 mg tablet [id number]  Take 1 tablet by mouth every 6 hours if needed for severe pain (7 - 10) for up to 10 days. January Garcia DO   02/15/25 2359   pantoprazole (ProtoNix) 40 mg EC tablet [id number] Yes TAKE 1 TABLET BY MOUTH ONCE  DAILY January Garcia DO  Active                    DISEASE MANAGEMENT ASSESSMENT:     CHF ASSESSMENT     Symptom/Staging:  -Most recent ejection fraction: 55-60%  -NYHA Stage:    Results for orders placed in visit on 23    Echocardiogram    [facility], [address] [place] [address]  Tel [phone] and Fax [phone]    TRANSTHORACIC ECHOCARDIOGRAM REPORT      Patient Name:     EULOGIO CRUZ Reading Physician:   [id number] Anthony Maravilla MD  Study Date:       2023      Referring Physician: KATELYNN CASTILLO  MRN/PID:          [id number]       PCP:  Accession/Order#: [id number]   Department Location: [facility]  YOB: 1950      Fellow:  Gender:           F              Nurse:  Admit Date:                      Sonographer:         Mee Harper \"RDCS, RCS\"  Admission Status: Outpatient     Additional Staff:  Height:           160.02 cm      CC Report to:  Weight:           102.06 kg      Study Type:          Echocardiogram  BSA:              2.03 m2  Blood Pressure: 120 /66 mmHg    Diagnosis/ICD: I42.0-Dilated cardiomyopathy  Indication:    Cardiomyopathy  Procedure/CPT: Echo Complete w Full [id number]    Patient History:  Pacer/Defib:       AICD  Pertinent History: " Cardiomyopathy and HTN. LV dysfunction.    Study Detail: The following Echo studies were performed: 2D, M-Mode, Doppler and  color flow.      PHYSICIAN INTERPRETATION:  Left Ventricle: The left ventricular systolic function is normal, with an estimated ejection fraction of 55-60%. The calculated ejection fraction is normal at 61 % using the Roy's Bi-plane MOD calculation. There are no regional wall motion abnormalities. The left ventricular cavity size is normal. There is borderline concentric left ventricular hypertrophy. Spectral Doppler shows an impaired relaxation pattern of left ventricular diastolic filling.  Left Atrium: The left atrium is upper limits of normal in size.  Right Ventricle: The right ventricle is normal in size. There is normal right ventriclar wall thickness. There is normal right ventricular global systolic function. A device is visualized in the right ventricle.  Right Atrium: The right atrium is normal in size. There is a device visualized in the right atrium.  Aortic Valve: The aortic valve appears structurally normal. The aortic valve appears tricuspid and non-restricted. There is no evidence of aortic valve regurgitation. The peak instantaneous gradient of the aortic valve is 16.8 mmHg. The mean gradient of the aortic valve is 9.0 mmHg.  Mitral Valve: The mitral valve is normal in structure. There is normal mitral valve leaflet mobility. There is mild mitral valve regurgitation.  Tricuspid Valve: The tricuspid valve is structurally normal. There is normal tricuspid valve leaflet mobility. There is trace tricuspid regurgitation.  Pulmonic Valve: The pulmonic valve is structurally normal. There is trace pulmonic valve regurgitation.  Pericardium: There is no pericardial effusion noted.  Aorta: The aortic root is normal. The Ao Sinus is 3.30 cm. There is no dilatation of the aortic arch. There is no dilatation of the ascending aorta. There is no dilatation of the aortic root.  Pulmonary  Artery: The estimated PASP is 25 mmHg.  In comparison to the previous echocardiogram(s): Compared with study from 3/28/2022,. The estimated LV ejection fraction has improved from 25-30% to 55-60% since the study of 3/28/2022.      CONCLUSIONS:  1. Left ventricular systolic function is normal with a 55-60% estimated ejection fraction.  2. Spectral Doppler shows an impaired relaxation pattern of left ventricular diastolic filling.  3. Mild mitral valve regurgitation.  4. The estimated LV ejection fraction has improved from 25-30% to 55-60% since the study of 3/28/2022.    QUANTITATIVE DATA SUMMARY:  2D MEASUREMENTS:  Normal Ranges:  Ao Root d:     3.30 cm    (2.0-3.7cm)  LAs:           3.80 cm    (2.7-4.0cm)  IVSd:          1.30 cm    (0.6-1.1cm)  LVPWd:         1.00 cm    (0.6-1.1cm)  LVIDd:         5.20 cm    (3.9-5.9cm)  LVIDs:         3.30 cm  LV Mass Index: 115.4 g/m2  LV % FS        36.5 %    LA VOLUME:  Normal Ranges:  LA Area A4C:     15.3 cm2  LA Area A2C:     17.6 cm2  LA Volume Index: 24.0 ml/m2  LA Vol A4C:      38.0 ml  LA Vol A2C:      49.5 ml  LA Vol BP:       50.0 ml    AORTA MEASUREMENTS:  Normal Ranges:  Ao Sinus, d: 3.30 cm (2.1-3.5cm)    LV SYSTOLIC FUNCTION BY 2D PLANIMETRY (MOD):  Normal Ranges:  EF-A4C View: 65.0 % (>=55%)  EF-A2C View: 59.0 %  EF-Biplane:  60.8 %    LV DIASTOLIC FUNCTION:  Normal Ranges:  MV Peak E:        0.62 m/s    (0.7-1.2 m/s)  MV Peak A:        0.89 m/s    (0.42-0.7 m/s)  E/A Ratio:        0.69        (1.0-2.2)  MV lateral e'     0.10 m/s  MV medial e'      0.10 m/s  MV A Dur:         145.00 msec  E/e' Ratio:       6.00        (<8.0)  PulmV Sys Mata:    76.00 cm/s  PulmV Beavers Mata:   43.40 cm/s  PulmV S/D Mata:    1.80  PulmV A Revs Mata: 51.30 cm/s  PulmV A Revs Dur: 137.00 msec    MITRAL VALVE:  Normal Ranges:  MV DT: 156 msec (150-240msec)    AORTIC VALVE:  Normal Ranges:  AoV Vmax:                2.05 m/s  (<=1.7m/s)  AoV Peak P.8 mmHg (<20mmHg)  AoV Mean  P.0 mmHg  (1.7-11.5mmHg)  LVOT Max Mata:            1.38 m/s  (<=1.1m/s)  AoV VTI:                 49.30 cm  (18-25cm)  LVOT VTI:                32.30 cm  LVOT Diameter:           2.10 cm   (1.8-2.4cm)  AoV Area, VTI:           2.27 cm2  (2.5-5.5cm2)  AoV Area,Vmax:           2.33 cm2  (2.5-4.5cm2)  AoV Dimensionless Index: 0.66    RIGHT VENTRICLE:  RV 1   3.2 cm  RV 2   3.3 cm  RV 3   6.2 cm  TAPSE: 31.0 mm    TRICUSPID VALVE/RVSP:  Normal Ranges:  Peak TR Velocity: 2.36 m/s  RV Syst Pressure: 25.3 mmHg (< 30mmHg)    PULMONIC VALVE:  Normal Ranges:  PV Max Mata: 1.0 m/s  (0.6-0.9m/s)  PV Max PG:  3.6 mmHg    Pulmonary Veins:  PulmV A Revs Dur: 137.00 msec  PulmV A Revs Mata: 51.30 cm/s  PulmV Beavers Mata:   43.40 cm/s  PulmV S/D Mata:    1.80  PulmV Sys Mata:    76.00 cm/s      [id number] Anthony Maravilla MD  Electronically signed on 1/15/2023 at 2:58:06 PM         Final       Guideline-Directed Medical Therapy:  -ARNI: Yes, describe: Entresto 24-26 mg BID  -Beta Blocker: Yes, describe: metoprolol succinate 100 mg daily   -MRA: No  -SGLT2i: Yes, describe: Jardiance 10 mg daily     Secondary Prevention:  -The 10-year ASCVD risk score (Brandon EL, et al., 2019) is: 32.4%    Values used to calculate the score:      Age: 75 years      Sex: Female      Is Non- : No      Diabetic: Yes      Tobacco smoker: No      Systolic Blood Pressure: 120 mmHg      Is BP treated: Yes      HDL Cholesterol: 35.8 mg/dL      Total Cholesterol: 163 mg/dL   -Aspirin 81mg? yes  -Statin?: Yes, describe: atorvastatin 40 mg  -HTN?: Yes, describe: metoprolol succinate 100 mg    CURRENT PHARMACOTHERAPY:    Entresto 24-26 mg BID  Jardiance 10 mg daily  Metoprolol succinate 100 mg daily    Affordability:  PAP for Jardiance and Entresto  Adherence/Compliance: No concerns for adherence. Patient states she has a 1 month pill container to help with adherence   Adverse Effects: Patient reports that she has diarrhea with  metformin, reports that she is seeing PCP tomorrow and will mention to provider     Monitoring Weights at Home: Yes  Home Weight Recordings: 217 lb - reports gaining 2 lb due to vacationing last week    Past In Office Weight Readings:   Wt Readings from Last 6 Encounters:   02/05/25 97.5 kg (215 lb)   01/17/25 101 kg (222 lb)   01/15/25 101 kg (222 lb 3.2 oz)   01/08/25 101 kg (222 lb)   01/02/25 101 kg (222 lb)   12/03/24 101 kg (222 lb)       Monitoring Blood Pressure at Home: Yes  Home BP Recordings: 124/74    Past In Office BP Readings:   BP Readings from Last 6 Encounters:   02/05/25 120/78   01/15/25 118/70   01/02/25 113/75   12/03/24 112/60   08/27/24 140/82   05/30/24 108/73       HEALTH MANAGEMENT    Maintaining fluid restriction (<2 L/day): No  Edema/swelling: No  Shortness of breath: No  Trouble sleeping/lying down: No  Dry/hacking cough: No  Recent Hospitalizations: No    EDUCATION/COUNSELING:   - Counseled patient on MOA, expectations, duration of therapy, contraindications, administration, and monitoring parameters  - Counseled patient on lifestyle modifications that can decrease your risk of having complications (smoking cessation, losing weight, daily weights, vaccines)  - Counseled patient on fluid intake and weight management. Recommended to not consume more than 2 liters of fliuids per day. If they have gained more than 2-3 pounds within a 24 hours period (or 5 pounds in a week), contact their cardiologist  - Counseled patient on the importance of staying hydrated   - Answered all patient questions and concerns       DISCUSSION/NOTES:   Today's appointment was 1 month follow up regarding heart failure management. Joana reports doing well on Entresto and Jardiance with no side effects. She mentions checking blood pressure every day in which it has been at goal.   She plans to start GLP-1 soon so will hold off on starting MRA to complete GDMT.     ASSESSMENT:    Assessment/Plan   Problem List Items  Addressed This Visit       Cardiomyopathy - Primary     Joana reports doing well on current heart failure pharmacotherapy. She is currently taking 3/4 GDMT. Will consider a MRA in the future to complete GDMT.           Other Visit Diagnoses       Congestive heart failure, unspecified HF chronicity, unspecified heart failure type        Relevant Orders    Referral to Clinical Pharmacy              RECOMMENDATIONS/PLAN:    Continue:  Entresto 24-26 mg twice daily  Jardiance 10 mg daily  Metoprolol succinate 100 mg daily   2.    Follow-up: 3 months     Last Appnt with Referring Provider: 1/2/2025  Next Appnt with Referring Provider: 7/3/2025  Clinical Pharmacist follow up: 6/17/2025  VAF/Application Expiration: Yes    Date: 2/24/2025  Type of Encounter: Adriana Alfaro PharmD  PGY-1 Pharmacy Resident     Verbal consent to manage patient's drug therapy was obtained from the patient . They were informed they may decline to participate or withdraw from participation in pharmacy services at any time.    Continue all meds under the continuation of care with the referring provider and clinical pharmacy team.

## 2025-03-17 NOTE — ASSESSMENT & PLAN NOTE
Joana reports doing well on current heart failure pharmacotherapy. She is currently taking 3/4 GDMT. Will consider a MRA in the future to complete GDMT.

## 2025-03-17 NOTE — PROGRESS NOTES
I reviewed the progress note and agree with the resident's findings and plans as written. Case discussed with resident.    Bertha Hartley, PharmD  [phone]

## 2025-03-18 ENCOUNTER — APPOINTMENT (OUTPATIENT)
Dept: PRIMARY CARE | Facility: CLINIC | Age: 75
End: 2025-03-18
Payer: MEDICARE

## 2025-03-18 VITALS
HEART RATE: 81 BPM | OXYGEN SATURATION: 97 % | DIASTOLIC BLOOD PRESSURE: 78 MMHG | SYSTOLIC BLOOD PRESSURE: 136 MMHG | TEMPERATURE: 97.2 F | BODY MASS INDEX: 38.39 KG/M2 | WEIGHT: 216.7 LBS

## 2025-03-18 DIAGNOSIS — I51.9 LV DYSFUNCTION: ICD-10-CM

## 2025-03-18 DIAGNOSIS — I50.9 CONGESTIVE HEART FAILURE, UNSPECIFIED HF CHRONICITY, UNSPECIFIED HEART FAILURE TYPE: Primary | ICD-10-CM

## 2025-03-18 DIAGNOSIS — I50.9 CHRONIC CONGESTIVE HEART FAILURE, UNSPECIFIED HEART FAILURE TYPE: ICD-10-CM

## 2025-03-18 DIAGNOSIS — I42.9 CARDIOMYOPATHY, UNSPECIFIED TYPE (MULTI): ICD-10-CM

## 2025-03-18 DIAGNOSIS — E11.9 TYPE 2 DIABETES MELLITUS WITHOUT COMPLICATION, WITHOUT LONG-TERM CURRENT USE OF INSULIN (MULTI): ICD-10-CM

## 2025-03-18 DIAGNOSIS — E11.8 CONTROLLED TYPE 2 DIABETES MELLITUS WITH COMPLICATION, WITHOUT LONG-TERM CURRENT USE OF INSULIN (MULTI): ICD-10-CM

## 2025-03-18 DIAGNOSIS — E11.8 CONTROLLED TYPE 2 DIABETES MELLITUS WITH COMPLICATION, WITHOUT LONG-TERM CURRENT USE OF INSULIN (MULTI): Primary | ICD-10-CM

## 2025-03-18 LAB — POC HEMOGLOBIN A1C: 10.5 % (ref 4.2–6.5)

## 2025-03-18 PROCEDURE — 3078F DIAST BP <80 MM HG: CPT | Performed by: FAMILY MEDICINE

## 2025-03-18 PROCEDURE — 1036F TOBACCO NON-USER: CPT | Performed by: FAMILY MEDICINE

## 2025-03-18 PROCEDURE — 1157F ADVNC CARE PLAN IN RCRD: CPT | Performed by: FAMILY MEDICINE

## 2025-03-18 PROCEDURE — 99214 OFFICE O/P EST MOD 30 MIN: CPT | Performed by: FAMILY MEDICINE

## 2025-03-18 PROCEDURE — 3075F SYST BP GE 130 - 139MM HG: CPT | Performed by: FAMILY MEDICINE

## 2025-03-18 PROCEDURE — 1123F ACP DISCUSS/DSCN MKR DOCD: CPT | Performed by: FAMILY MEDICINE

## 2025-03-18 PROCEDURE — 1159F MED LIST DOCD IN RCRD: CPT | Performed by: FAMILY MEDICINE

## 2025-03-18 PROCEDURE — 83036 HEMOGLOBIN GLYCOSYLATED A1C: CPT | Performed by: FAMILY MEDICINE

## 2025-03-18 RX ORDER — TIRZEPATIDE 5 MG/.5ML
5 INJECTION, SOLUTION SUBCUTANEOUS
Qty: 3 ML | Refills: 1 | Status: SHIPPED | OUTPATIENT
Start: 2025-03-18 | End: 2025-03-21 | Stop reason: SDUPTHER

## 2025-03-18 ASSESSMENT — ENCOUNTER SYMPTOMS
LIGHT-HEADEDNESS: 0
CONSTIPATION: 0
ARTHRALGIAS: 0
DIZZINESS: 0
HEADACHES: 0
JOINT SWELLING: 0
SHORTNESS OF BREATH: 0
UNEXPECTED WEIGHT CHANGE: 1
FEVER: 0
NERVOUS/ANXIOUS: 0
SORE THROAT: 0
ABDOMINAL PAIN: 0
PALPITATIONS: 0
SLEEP DISTURBANCE: 0
HEMATURIA: 0
SINUS PRESSURE: 0
ACTIVITY CHANGE: 1
NUMBNESS: 0
EYE ITCHING: 0
DYSPHORIC MOOD: 0
RHINORRHEA: 0
NAUSEA: 0
VOMITING: 0
EYE DISCHARGE: 0
MYALGIAS: 0
DIARRHEA: 1
APPETITE CHANGE: 0
BLOOD IN STOOL: 0
WHEEZING: 0
COUGH: 0
FLANK PAIN: 0
WEAKNESS: 0
DYSURIA: 0

## 2025-03-18 NOTE — PROGRESS NOTES
Subjective   Patient ID: Joana Elizabeth is a 75 y.o. female who presents for Diabetes (A1c check/Last done 12/03/24 8.2/Today is 10.5).    [facility] PHARM AND CARDIOLOGIST HAS RECOMMENDED   EXCESS DIARRHEA WITH METFORMIN   DIETARY MEASURE WILL BE FOLLOWED CLOSER     Review of Systems   Constitutional:  Positive for activity change and unexpected weight change. Negative for appetite change and fever.        MORE ACTIVE LATELY AND HAS LOST SOME WEIGHT   B/S IS ALSO UNCONTROLLED    HENT:  Negative for congestion, ear pain, postnasal drip, rhinorrhea, sinus pressure and sore throat.    Eyes:  Negative for discharge, itching and visual disturbance.   Respiratory:  Negative for cough, shortness of breath and wheezing.    Cardiovascular:  Negative for chest pain, palpitations and leg swelling.   Gastrointestinal:  Positive for diarrhea. Negative for abdominal pain, blood in stool, constipation, nausea and vomiting.        WITH THE METFORMIN    Endocrine: Negative for cold intolerance, heat intolerance and polyuria.   Genitourinary:  Negative for dysuria, flank pain and hematuria.   Musculoskeletal:  Negative for arthralgias, gait problem, joint swelling and myalgias.   Skin:  Negative for rash.   Allergic/Immunologic: Negative for environmental allergies and food allergies.   Neurological:  Negative for dizziness, syncope, weakness, light-headedness, numbness and headaches.   Psychiatric/Behavioral:  Negative for dysphoric mood and sleep disturbance. The patient is not nervous/anxious.        Objective   /78   Pulse 81   Temp 36.2 °C (97.2 °F)   Wt 98.3 kg (216 lb 11.2 oz)   SpO2 97%   BMI 38.39 kg/m²     Physical Exam  Vitals and nursing note reviewed.   Constitutional:       Appearance: Normal appearance. She is obese.   HENT:      Head: Normocephalic.   Cardiovascular:      Rate and Rhythm: Normal rate and regular rhythm.      Pulses: Normal pulses.      Heart sounds: Normal heart sounds. No murmur heard.     No  friction rub. No gallop.      Comments: PACED RHYTHM AND ICD  IN PLACE   SEES DR MCDONALD   Pulmonary:      Effort: Pulmonary effort is normal. No respiratory distress.      Breath sounds: Normal breath sounds. No wheezing.   Abdominal:      General: There is no distension.      Palpations: Abdomen is soft.      Tenderness: There is no abdominal tenderness.   Musculoskeletal:         General: No deformity. Normal range of motion.   Skin:     General: Skin is warm and dry.      Capillary Refill: Capillary refill takes less than 2 seconds.   Neurological:      General: No focal deficit present.      Mental Status: She is alert and oriented to person, place, and time.   Psychiatric:         Mood and Affect: Mood normal.         Assessment/Plan   Problem List Items Addressed This Visit             ICD-10-CM    Controlled type 2 diabetes mellitus with complication, without long-term current use of insulin (Multi) - Primary E11.8    Relevant Medications    tirzepatide (Mounjaro) 5 mg/0.5 mL pen injector    Type 2 diabetes mellitus E11.9    Relevant Orders    POCT glycosylated hemoglobin (Hb A1C) manually resulted (Completed)    LV dysfunction I51.9    Relevant Medications    tirzepatide (Mounjaro) 5 mg/0.5 mL pen injector    Cardiomyopathy I42.9    Chronic congestive heart failure I50.9    Relevant Medications    tirzepatide (Mounjaro) 5 mg/0.5 mL pen injector

## 2025-03-18 NOTE — PATIENT INSTRUCTIONS
SENT HALINA KERR NEEDLES   TO [facility]  JUST STOP THE METFORMIN   FOLLOW IN 3 MONTHS

## 2025-03-19 ENCOUNTER — TREATMENT (OUTPATIENT)
Dept: OCCUPATIONAL THERAPY | Facility: CLINIC | Age: 75
End: 2025-03-19
Payer: MEDICARE

## 2025-03-19 DIAGNOSIS — M79.641 HAND PAIN, RIGHT: ICD-10-CM

## 2025-03-19 DIAGNOSIS — M25.641 JOINT STIFFNESS OF HAND, RIGHT: ICD-10-CM

## 2025-03-19 PROCEDURE — 97110 THERAPEUTIC EXERCISES: CPT | Mod: GO

## 2025-03-19 PROCEDURE — 97140 MANUAL THERAPY 1/> REGIONS: CPT | Mod: GO

## 2025-03-19 NOTE — PROGRESS NOTES
Occupational Therapy  Occupational Therapy Treatment    Patient Name: Joana Elizabeth  MRN: [id number]  Today's Date: 3/19/2025  Time Calculation  Start Time: 1239  Stop Time: 1321  Time Calculation (min): 42 min    Visit # 4       General Comment: Visit 4, no auth    Assessment: Tissue mobility increased, Joint mobility/ROM increased, flexibility increased, muscle fatigue increased.  Pt completed session with some difficulty.           Plan: Progress with POC, As tolerated and monitor home program.        Current Problem  1. Joint stiffness of hand, right  Follow Up In Occupational Therapy      2. Hand pain, right  Follow Up In Occupational Therapy          Precautions n/a       Subjective:   Patient reports I still cannot push the deodorant container. It hurts when I lift clothes.     Pain  No number given.        Performing HEP?: Yes    Objective:   All exercises held for 10 seconds unless noted otherwise   Treatments:   This therapist instructed and demonstrated interventions to patient, patient completed the following under direct supervision of this therapist:      Therapeutic Exercise:   min  34 MINUTES  Therapeutic Exercise  Therapeutic Exercise Activity 12: Supination isometrics.with AP head glides.  Therapeutic Exercise Activity 13: Radial nerve glide # 2  Therapeutic Exercise Activity 14: Radial nerve glide #3  Therapeutic Exercise Activity 16: Wrist flexion isometrics x 10  Therapeutic Exercise Activity 17: Wrist ext isometrics x 10  Therapeutic Exercise Activity 18: Simulated spray with coral putty x 10  Therapeutic Exercise Activity 19: Mass grasp x 10 coral.  Therapeutic Exercise Activity 20: Putty rolling x 10 coral putty.    Manual Therapy:       8 MINUTES  Manual Therapy  Manual Therapy Performed: Yes  Manual Therapy Activity 1: PA and AP radial head glides x 10  Manual Therapy Activity 3: Trigger release to posterior deltoid for pain relief.        Education: Pt educated on wrist isometrics, radial  nerve glides and putty.       Jacobo Hayden, OT, CHT    Active       OT Goals       Pt will demonstrate in improve in function by improving to 20.0 on the quick dash to signify increased independence with ADL'S with decreased pain.         Start:  02/20/25    Expected End:  08/20/25            Patient will improve thumb  ROM in order to perform self-care, household, work and or leisure tasks, Right Thumb ROM; MP 60   Degrees, IP   60    Degrees, Radial Abduction  45       Degrees, Real Abduction  45  Degrees.         Start:  02/20/25    Expected End:  08/20/25            Patient will improve hand strength in order to perform self-care, household, work and or leisure tasks Right  Hand Strength Goal;       35    #, Key Pinch     8    #,       Start:  02/20/25    Expected End:  08/20/25            Patient will improve strengthening in order to perform self-care, household, work and or leisure tasks, Right Wrist Strength Goal   Extension   /5,  Flexion    /5, Ulnar deviation /5, Radial deviation   /5  4+/5 globally       Start:  02/20/25    Expected End:  08/20/25            Patient will improve wrist range of motion in order to perform self-care, household, work and or leisure tasks. Wrist Extension 65,             Flexion 60,                     Start:  02/20/25    Expected End:  08/20/25               OT Problem       PATIENT WILL DEMONSTRATE INDEPENDENCE IN HOME PROGRAM FOR SUPPORT OF PROGRESSION       Start:  02/20/25    Expected End:  08/20/25            PATIENT WILL REPORT PAIN OF 0-2/10 DEMONSTRATING A REDUCTION OF OVERALL PAIN       Start:  02/20/25    Expected End:  08/20/25

## 2025-03-20 NOTE — PROGRESS NOTES
Clinical Pharmacy Appointment    Patient ID: Joana Elizabeth is a 75 y.o. female who presents for Diabetes.    Pt is here for First appointment.     Referring Provider: Janaury Garcia DO  PCP: January Garcia DO   Last visit with PCP: 3/18/2025   Next visit with PCP: 6/20/2025    Subjective       Interval History  Referred for new start of GLP1a for glycemic and metabolic benefit  PMH CHF, HTN, HLD, WILLIAM, GERD  Gastroparesis listed in problem list - gastric emptying test was normal in 2019.   Denies constipation  Enrolled in  [place] for coverage of Entresto and Jardiance  Endorses polydipsia and fatigue when > 300, poyluria at baseline (on diuretic)     DIABETES MELLITUS TYPE 2:    Diagnosed with diabetes:  ~30 years  Known diabetic complications: none.  Does patient follow with Endocrinology: No  Last optometry exam: twice yearly   Most recent visit in Podiatry: none-- patient denies sores or cuts on feet today      Current diabetic medications include:  Metformin 1000mg twice daily     Adverse Effects: Diarrhea    Past diabetic medications include:  Does not recall     Glucose Readings:  Glucometer  Patient tests BG 1-2 times per day    Current home BG readings (mg/dL): 200-300 AM FBG    Previous home BG readings (mg/dL): n/a    Any episodes of hypoglycemia? No  Did patient treat episode of hypoglycemia appropriately? N/A  Does the patient have a prescription for ready-to-use Glucagon? Not on insulin    Lifestyle:  Diet: 2 large meals/day. 1 PM and 6 PM.   BK: nothing or yogurt   LN+ DN: Carb dense; past week incorporating more meat and vegs. Was eating a lot of chocolate blueberries.  Snacks: None  Drinks: Crystal light water (zero sugar), coffee 2-3 cups in AM  Exercise: does not exercise  Activity type: Type of Exercise : walking 4 K steps per day around her house daily   Is limited by: back pain and joint pain  Tobacco history: Denies   Alcohol use: Denies     Secondary Prevention:  Statin?  Yes  ACE-I/ARB? Yes (Entresto)  Aspirin? Yes    Pertinent PMH Review:  PMH of Pancreatitis: No  PMH of Retinopathy: No  PMH of Urinary Tract Infections: No  PMH of MTC: No  PMH of MEN2: No  UACR/EGFR in last year?: Yes  POC ALBUMIN /CREATININE RATIO MANUALLY ENTERED   Date Value Ref Range Status   05/14/2024 <30 <30 ug/mg Creat Final   10/10/2023 30 - 300 (A) <30 ug/mg Creat Final     Albumin/Creatine Ratio   Date Value Ref Range Status   01/27/2022 SEE COMMENT 0.0 - 30.0 ug/mg crt Final     Comment:     One or more analytes used in this calculation   is outside of the analytical measurement range.  Calculation cannot be performed.       Drug Interactions  No relevant drug interactions were noted.    Medication System Management  Patient's preferred pharmacy: [facility]   Adherence/Organization: No barriers   Affordability/Accessibility: Enrolled in [facility] through 2/24/2026    Objective   Allergies   Allergen Reactions    Shellfish Containing Products Anaphylaxis    Codeine Nausea And Vomiting and Other     GI Intolerance    Tramadol Other and Itching     tongue blistered    Celecoxib Rash     Social History     Social History Narrative    Not on file      Medication Review  Current Outpatient Medications   Medication Instructions    albuterol 90 mcg/actuation inhaler 1 puff, Every 4 hours PRN    aspirin 81 mg capsule 1 tablet, Daily    atorvastatin (LIPITOR) 40 mg, oral, Daily    biotin 10 mg tablet 1 tablet, Daily    blood sugar diagnostic (Truetrack Test) strip TEST twice a day    calcium carbonate-vitamin D3 600 mg-20 mcg (800 unit) tablet Caltrate with Vitamin D3 600 mg (1,500 mg)-800 unit tablet    coenzyme Q-10 200 mg capsule 1 capsule, Daily    cyanocobalamin (VITAMIN B-12) 250 mcg, Daily    escitalopram (LEXAPRO) 20 mg, oral, Daily    ferrous sulfate 325 (65 Fe) MG tablet 65 mg of iron, 2 times daily    fexofenadine (Allegra) 180 mg tablet 1 tablet, Daily    fluticasone (Flonase) 50 mcg/actuation nasal spray 1  spray, Daily    folic acid (FOLVITE) 1 mg, Daily    furosemide (LASIX) 20 mg, oral, Daily    gabapentin (NEURONTIN) 300 mg, oral, 2 times daily    gabapentin (NEURONTIN) 100 mg, 2 times daily    hydroxychloroquine (Plaquenil) 200 mg tablet oral, 2 times daily    Jardiance 10 mg, oral, Daily    levothyroxine (SYNTHROID, LEVOXYL) 100 mcg, oral, Daily    magnesium oxide (Mag-Ox) 400 mg tablet 2 tablets, Daily    meloxicam (MOBIC) 7.5 mg, oral, Daily    metFORMIN (GLUCOPHAGE) 1,000 mg, oral, 2 times daily (morning and late afternoon)    methocarbamol (Robaxin) 500 mg tablet Take 1-2 tabs every 8 hours as needed for spasms    metoprolol succinate XL (TOPROL-XL) 100 mg, oral, Daily    Mounjaro 5 mg, subcutaneous, Every 7 days    multivit/folic acid/vit K1 (WOMEN'S 50 PLUS ADVANCED ORAL) 1 tablet, Daily    pantoprazole (PROTONIX) 40 mg, oral, Daily    sacubitriL-valsartan (Entresto) 24-26 mg tablet 1 tablet, oral, 2 times daily      Vitals  BP Readings from Last 2 Encounters:   03/18/25 136/78   02/05/25 120/78     BMI Readings from Last 1 Encounters:   03/18/25 38.39 kg/m²      Labs  Lab Results   Component Value Date    HGBA1C 10.5 (A) 03/18/2025    HGBA1C 8.2 (A) 12/03/2024    HGBA1C 8.6 (H) 08/23/2024     Lab Results   Component Value Date    CALCIUM 9.5 08/23/2024     08/23/2024    K 4.3 08/23/2024    CO2 26 08/23/2024     08/23/2024    BUN 30 (H) 08/23/2024    CREATININE 0.93 08/23/2024    EGFR 65 08/23/2024     Lab Results   Component Value Date    ALT 14 08/23/2024    AST 15 08/23/2024    ALKPHOS 49 08/23/2024    BILITOT 0.5 08/23/2024     Lab Results   Component Value Date    TRIG 479 (H) 08/23/2024    CHOL 163 08/23/2024    LDLF 46 01/26/2022    LDLCALC  08/23/2024      Comment:      The calculation of LDL and VLDL are inaccurate when the Triglycerides are greater than 400 mg/dL or when the patient is non-fasting. If LDL measurement is necessary contact the testing laboratory for an alternative LDL  assay.                                  Near   Borderline      AGE      Desirable  Optimal    High     High     Very High     0-19 Y     0 - 109     ---    110-129   >/= 130     ----    20-24 Y     0 - 119     ---    120-159   >/= 160     ----      >24 Y     0 -  99   100-129  130-159   160-189     >/=190      HDL 35.8 08/23/2024     Lab Results   Component Value Date    MICROALBCREA <30 05/14/2024     Wt Readings from Last 3 Encounters:   03/18/25 98.3 kg (216 lb 11.2 oz)   02/05/25 97.5 kg (215 lb)   01/17/25 101 kg (222 lb)      There is no height or weight on file to calculate BMI.     Assessment/Plan   Problem List Items Addressed This Visit       Controlled type 2 diabetes mellitus with complication, without long-term current use of insulin (Multi)     Other Visit Diagnoses       Congestive heart failure, unspecified HF chronicity, unspecified heart failure type              Patient's goal A1c is < 7.5%.  Is pt at goal? No, 10.5%  Patient's SMBGs are uncontrolled.       Medication Changes:  CONTINUE  Metformin 1000mg twice daily    START  Mounjaro 2.5mg once weekly   Request sent for addition to  patient assistance program     Future Considerations:  Patients goal is to reduce pill buren. Pending adequate glycemic control and tolerance with GLP1a dose escalations will aim to stop metformin as able.    Monitoring and Education:  Counseled patient on Mounjaro MOA, expectations, side effects, duration of therapy, administration, and monitoring parameters.  Provided detailed dosing and administration counseling to ensure proper technique.   Reviewed Mounjaro titration schedule, starting with 2.5 mg once weekly to a goal of 15 mg once weekly if tolerated  Counseled patient on the benefits of GLP-1ra glycemic control and weight loss  Reviewed storage requirements of Mounjaro when not in use, and when to administer the medication if a dose is missed.  Advised patient that they may experience improved satiety after  meals and portion sizes of meals may be reduced as doses of Mounjaro increase.     Clinical Pharmacist follow-up: 3 weeks 4/11 0920, Telehealth visit    Continue all meds under the continuation of care with the referring provider and clinical pharmacy team.    Thank you,  Alyssa Munoz, PharmD  Clinical Pharmacy Specialist Primary Care  [phone]     Verbal consent to manage patient's drug therapy was obtained from the patient. They were informed they may decline to participate or withdraw from participation in pharmacy services at any time.

## 2025-03-21 ENCOUNTER — TELEMEDICINE (OUTPATIENT)
Dept: PHARMACY | Facility: HOSPITAL | Age: 75
End: 2025-03-21
Payer: MEDICARE

## 2025-03-21 ENCOUNTER — TREATMENT (OUTPATIENT)
Dept: OCCUPATIONAL THERAPY | Facility: CLINIC | Age: 75
End: 2025-03-21
Payer: MEDICARE

## 2025-03-21 DIAGNOSIS — M79.641 HAND PAIN, RIGHT: ICD-10-CM

## 2025-03-21 DIAGNOSIS — M25.641 JOINT STIFFNESS OF HAND, RIGHT: ICD-10-CM

## 2025-03-21 DIAGNOSIS — I50.9 CONGESTIVE HEART FAILURE, UNSPECIFIED HF CHRONICITY, UNSPECIFIED HEART FAILURE TYPE: ICD-10-CM

## 2025-03-21 DIAGNOSIS — E11.8 CONTROLLED TYPE 2 DIABETES MELLITUS WITH COMPLICATION, WITHOUT LONG-TERM CURRENT USE OF INSULIN (MULTI): ICD-10-CM

## 2025-03-21 PROCEDURE — RXMED WILLOW AMBULATORY MEDICATION CHARGE

## 2025-03-21 PROCEDURE — 97110 THERAPEUTIC EXERCISES: CPT | Mod: GO

## 2025-03-21 RX ORDER — TIRZEPATIDE 2.5 MG/.5ML
2.5 INJECTION, SOLUTION SUBCUTANEOUS WEEKLY
Qty: 2 ML | Refills: 0 | Status: SHIPPED | OUTPATIENT
Start: 2025-03-21

## 2025-03-21 NOTE — PROGRESS NOTES
Occupational Therapy  Occupational Therapy Treatment    Patient Name: Joana Elizabeth  MRN: [id number]  Today's Date: 3/21/2025  Time Calculation  Start Time: 1306 (in late)  Stop Time: 1350  Time Calculation (min): 44 min    Visit # 5       General Comment: Visit 4, no auth    Assessment: Joint mobility/ROM increased, flexibility increased, muscle fatigue increased.  Pt completed session with some difficulty.           Plan: Progress with POC, As tolerated and monitor home program.        Current Problem  1. Joint stiffness of hand, right  Follow Up In Occupational Therapy      2. Hand pain, right  Follow Up In Occupational Therapy          Precautions N/A       Subjective:   Patient reports It's feeling better     Pain   0/10    Performing HEP?: Yes    Objective:   All exercises held for 10 seconds unless noted otherwise   Treatments:   This therapist instructed and demonstrated interventions to patient, patient completed the following under direct supervision of this therapist:      Therapeutic Exercise:   min    MINUTES  Therapeutic Exercise  Therapeutic Exercise Activity 1: Wrist flexion x 5 AA  Therapeutic Exercise Activity 2: Wrist ext x 5 AA  Therapeutic Exercise Activity 3: Wrist flexion PRE x 10 1#  Therapeutic Exercise Activity 4: Wrist extension PRE x 10 1#  Therapeutic Exercise Activity 5: Flexbar downward rotation x 20 red  Therapeutic Exercise Activity 6: Flexbar upward rotation x 20 red  Therapeutic Exercise Activity 7: Flexbar pronation x 20 red.  Therapeutic Exercise Activity 8: Flexbar Supination x 20 red  Therapeutic Exercise Activity 11: Radial nerve glide #1  Therapeutic Exercise Activity 13: Radial nerve glide # 2  Therapeutic Exercise Activity 14: Radial nerve glide #3    Education:Pt educated on radial nerve glide #1. Pt educated on where to purchase flexbar and what colors to purchase.       Jacobo Hayden, OT, CHT    Active       OT Goals       Pt will demonstrate in improve in function by  improving to 20.0 on the quick dash to signify increased independence with ADL'S with decreased pain.         Start:  02/20/25    Expected End:  08/20/25            Patient will improve thumb  ROM in order to perform self-care, household, work and or leisure tasks, Right Thumb ROM; MP 60   Degrees, IP   60    Degrees, Radial Abduction  45       Degrees, Real Abduction  45  Degrees.         Start:  02/20/25    Expected End:  08/20/25            Patient will improve hand strength in order to perform self-care, household, work and or leisure tasks Right  Hand Strength Goal;       35    #, Key Pinch     8    #,       Start:  02/20/25    Expected End:  08/20/25            Patient will improve strengthening in order to perform self-care, household, work and or leisure tasks, Right Wrist Strength Goal   Extension   /5,  Flexion    /5, Ulnar deviation /5, Radial deviation   /5  4+/5 globally       Start:  02/20/25    Expected End:  08/20/25            Patient will improve wrist range of motion in order to perform self-care, household, work and or leisure tasks. Wrist Extension 65,             Flexion 60,                     Start:  02/20/25    Expected End:  08/20/25               OT Problem       PATIENT WILL DEMONSTRATE INDEPENDENCE IN HOME PROGRAM FOR SUPPORT OF PROGRESSION       Start:  02/20/25    Expected End:  08/20/25            PATIENT WILL REPORT PAIN OF 0-2/10 DEMONSTRATING A REDUCTION OF OVERALL PAIN       Start:  02/20/25    Expected End:  08/20/25

## 2025-03-25 ENCOUNTER — TREATMENT (OUTPATIENT)
Dept: OCCUPATIONAL THERAPY | Facility: CLINIC | Age: 75
End: 2025-03-25
Payer: MEDICARE

## 2025-03-25 DIAGNOSIS — M25.641 JOINT STIFFNESS OF HAND, RIGHT: ICD-10-CM

## 2025-03-25 DIAGNOSIS — M79.641 HAND PAIN, RIGHT: ICD-10-CM

## 2025-03-25 PROCEDURE — 97110 THERAPEUTIC EXERCISES: CPT | Mod: GO

## 2025-03-25 PROCEDURE — 97035 APP MDLTY 1+ULTRASOUND EA 15: CPT | Mod: GO

## 2025-03-25 ASSESSMENT — PAIN SCALES - GENERAL: PAINLEVEL_OUTOF10: 0 - NO PAIN

## 2025-03-25 ASSESSMENT — PAIN - FUNCTIONAL ASSESSMENT: PAIN_FUNCTIONAL_ASSESSMENT: 0-10

## 2025-03-25 NOTE — PROGRESS NOTES
"Occupational Therapy    Occupational Therapy Treatment    Patient Name: Joana Elizabeth  MRN: [id number]  Today's Date: 3/25/2025  Time Calculation  Start Time: 1315  Stop Time: 1359  Time Calculation (min): 44 min    Visit # 6       General Comment: Visit 6, no auth    Assessment:   improved ROM with decreased stiffness and pain noted     Pt completed session with some difficulty.           Plan: Progress with POC, As tolerated and monitor home program.        Current Problem  1. Joint stiffness of hand, right  Follow Up In Occupational Therapy      2. Hand pain, right  Follow Up In Occupational Therapy          Precautions       Subjective:   Patient reports \" I am feeling better now\"     Pain  Pain Assessment: 0-10  0-10 (Numeric) Pain Score: 0 - No pain*    Performing HEP?: Yes    Objective:   All exercises held for 10 seconds unless noted otherwise   Treatments:   This therapist instructed and demonstrated interventions to patient, patient completed the following under direct supervision of this therapist:  Modalities:        8MINUTES    Completed US x 8 minutes x .8 CM/2 x 3.3 MHz over volar and dorsal side of wrist in order to promote healing and increased ROM and strengthening      Therapeutic Exercise:   min  36 MINUTES  Therapeutic Exercise  Therapeutic Exercise Performed: Yes  Therapeutic Exercise Activity 1: wrist flexion 10  Therapeutic Exercise Activity 2: wrist extension 10  Therapeutic Exercise Activity 3: UD 10  Therapeutic Exercise Activity 4: RD 10  Therapeutic Exercise Activity 5: wrist extenison 2#  Therapeutic Exercise Activity 6: wrist flexion 2#  Therapeutic Exercise Activity 7: theraweb green twist supination  Therapeutic Exercise Activity 8: theraweb green pronation  Therapeutic Exercise Activity 9: therabar twisting with wrist in flexion and extension 10 reps  Therapeutic Exercise Activity 10: red flexbar downward 10 rpes  Therapeutic Exercise Activity 11: red flexbar upward 10 " reps  Therapeutic Exercise Activity 12: green flexbar upward 10 reps  Therapeutic Exercise Activity 13: green flexbar downward 10 reps    Manual Therapy:         MINUTES       Therapeutic Activity:        MINUTES         Education:  increased resistance 2#       Ramila Riley, OT,    Active       OT Goals       Pt will demonstrate in improve in function by improving to 20.0 on the quick dash to signify increased independence with ADL'S with decreased pain.         Start:  02/20/25    Expected End:  08/20/25            Patient will improve thumb  ROM in order to perform self-care, household, work and or leisure tasks, Right Thumb ROM; MP 60   Degrees, IP   60    Degrees, Radial Abduction  45       Degrees, Real Abduction  45  Degrees.         Start:  02/20/25    Expected End:  08/20/25            Patient will improve hand strength in order to perform self-care, household, work and or leisure tasks Right  Hand Strength Goal;       35    #, Key Pinch     8    #,       Start:  02/20/25    Expected End:  08/20/25            Patient will improve strengthening in order to perform self-care, household, work and or leisure tasks, Right Wrist Strength Goal   Extension   /5,  Flexion    /5, Ulnar deviation /5, Radial deviation   /5  4+/5 globally       Start:  02/20/25    Expected End:  08/20/25            Patient will improve wrist range of motion in order to perform self-care, household, work and or leisure tasks. Wrist Extension 65,             Flexion 60,                     Start:  02/20/25    Expected End:  08/20/25               OT Problem       PATIENT WILL DEMONSTRATE INDEPENDENCE IN HOME PROGRAM FOR SUPPORT OF PROGRESSION       Start:  02/20/25    Expected End:  08/20/25            PATIENT WILL REPORT PAIN OF 0-2/10 DEMONSTRATING A REDUCTION OF OVERALL PAIN       Start:  02/20/25    Expected End:  08/20/25

## 2025-03-27 ENCOUNTER — PHARMACY VISIT (OUTPATIENT)
Dept: PHARMACY | Facility: CLINIC | Age: 75
End: 2025-03-27
Payer: COMMERCIAL

## 2025-03-28 ENCOUNTER — TREATMENT (OUTPATIENT)
Dept: OCCUPATIONAL THERAPY | Facility: CLINIC | Age: 75
End: 2025-03-28
Payer: MEDICARE

## 2025-03-28 DIAGNOSIS — M79.641 HAND PAIN, RIGHT: ICD-10-CM

## 2025-03-28 DIAGNOSIS — M25.641 JOINT STIFFNESS OF HAND, RIGHT: ICD-10-CM

## 2025-03-28 PROCEDURE — 97035 APP MDLTY 1+ULTRASOUND EA 15: CPT | Mod: GO

## 2025-03-28 PROCEDURE — 97110 THERAPEUTIC EXERCISES: CPT | Mod: GO

## 2025-03-28 NOTE — PROGRESS NOTES
Occupational Therapy  Occupational Therapy Treatment    Patient Name: Joana Elizabeth  MRN: [id number]  Today's Date: 3/28/2025  Time Calculation  Start Time: 1300  Stop Time: 1344  Time Calculation (min): 44 min    Visit # 7       General Comment: Visit 7, no auth    Assessment: Tissue mobility increased , Joint mobility/ROM increased, flexibility increased, muscle fatigue increased.    Pt completed session with some difficulty.           Plan: Progress with POC, As tolerated and monitor home program. Advance to 3# wrist weight PRE.        Current Problem  1. Joint stiffness of hand, right  Follow Up In Occupational Therapy      2. Hand pain, right  Follow Up In Occupational Therapy          Precautions  N/A       Subjective:   Patient reports I don't have any pain today    Pain   0/10    Performing HEP?: Yes    Objective:   All exercises held for 10 seconds unless noted otherwise   Treatments:   This therapist instructed and demonstrated interventions to patient, patient completed the following under direct supervision of this therapist:  Modalities:        8MINUTES  3.3 MHz .8 W c/m2 for 8 minutes to in preparation for ROM/strength exercises   To volar and dorsal thumb.     Therapeutic Exercise:   min  36 MINUTES  Therapeutic Exercise  Therapeutic Exercise Activity 1: Thumb lainez and radial abd/add x 5  Therapeutic Exercise Activity 5: wrist extenison 2# 2 x 10  Therapeutic Exercise Activity 6: wrist flexion 2# 2 x10  Therapeutic Exercise Activity 7: Tendon glides x 5  Therapeutic Exercise Activity 8: Extrinsic extensor stretch x 5  Therapeutic Exercise Activity 9: Extrinsic flexor stretch x 5  Therapeutic Exercise Activity 10: Screw bar supination/pronation x 10  Therapeutic Exercise Activity 11: Screw Bar UD/RD x 10        Education: Pt educated on screw bar PRE.       Jacobo Hayden, OT, CHT    Active       OT Goals       Pt will demonstrate in improve in function by improving to 20.0 on the quick dash to  signify increased independence with ADL'S with decreased pain.         Start:  02/20/25    Expected End:  08/20/25            Patient will improve thumb  ROM in order to perform self-care, household, work and or leisure tasks, Right Thumb ROM; MP 60   Degrees, IP   60    Degrees, Radial Abduction  45       Degrees, Real Abduction  45  Degrees.         Start:  02/20/25    Expected End:  08/20/25            Patient will improve hand strength in order to perform self-care, household, work and or leisure tasks Right  Hand Strength Goal;       35    #, Key Pinch     8    #,       Start:  02/20/25    Expected End:  08/20/25            Patient will improve strengthening in order to perform self-care, household, work and or leisure tasks, Right Wrist Strength Goal   Extension   /5,  Flexion    /5, Ulnar deviation /5, Radial deviation   /5  4+/5 globally       Start:  02/20/25    Expected End:  08/20/25            Patient will improve wrist range of motion in order to perform self-care, household, work and or leisure tasks. Wrist Extension 65,             Flexion 60,                     Start:  02/20/25    Expected End:  08/20/25               OT Problem       PATIENT WILL DEMONSTRATE INDEPENDENCE IN HOME PROGRAM FOR SUPPORT OF PROGRESSION       Start:  02/20/25    Expected End:  08/20/25            PATIENT WILL REPORT PAIN OF 0-2/10 DEMONSTRATING A REDUCTION OF OVERALL PAIN       Start:  02/20/25    Expected End:  08/20/25

## 2025-04-01 ENCOUNTER — TREATMENT (OUTPATIENT)
Dept: OCCUPATIONAL THERAPY | Facility: CLINIC | Age: 75
End: 2025-04-01
Payer: MEDICARE

## 2025-04-01 DIAGNOSIS — M79.641 HAND PAIN, RIGHT: ICD-10-CM

## 2025-04-01 DIAGNOSIS — M25.641 JOINT STIFFNESS OF HAND, RIGHT: ICD-10-CM

## 2025-04-01 PROCEDURE — 97110 THERAPEUTIC EXERCISES: CPT | Mod: GO

## 2025-04-01 PROCEDURE — 97140 MANUAL THERAPY 1/> REGIONS: CPT | Mod: GO

## 2025-04-01 NOTE — PROGRESS NOTES
Occupational Therapy  Occupational Therapy Treatment    Patient Name: Joana Elizabeth  MRN: [id number]  Today's Date: 4/1/2025  Time Calculation  Start Time: 1313  Stop Time: 1400  Time Calculation (min): 47 min    Visit # 8            Assessment: Tissue mobility increased , Joint mobility/ROM increased, flexibility increased, muscle fatigue increased.    Pt completed session with some difficulty.           Plan: Progress with POC, As tolerated and monitor home program.        Current Problem  1. Joint stiffness of hand, right  Follow Up In Occupational Therapy      2. Hand pain, right  Follow Up In Occupational Therapy          Precautions N/A       Subjective:   Patient reports It's this middle finger with this muscle down here.     Pain   0/10    Performing HEP?: Yes    Objective:   All exercises held for 10 seconds unless noted otherwise   Treatments:   This therapist instructed and demonstrated interventions to patient, patient completed the following under direct supervision of this therapist:  Modalities:        8MINUTES  3.3 MHz .8 W c/m2 for 8 minutes to in preparation for ROM/strength exercises   To volar forearm.      Therapeutic Exercise:   min  25 MINUTES  Therapeutic Exercise  Therapeutic Exercise Activity 1: Wrist flexion x 10  Therapeutic Exercise Activity 2: Wrist extension x 10  Therapeutic Exercise Activity 4: RD x 10  Therapeutic Exercise Activity 5: Wrist flexion/ext PRE with maroon band x 10 2 sets  Therapeutic Exercise Activity 6: Wrist RD/UD with maroon band. 2 sets  Therapeutic Exercise Activity 10: Screw bar supination/pronation x 10  Therapeutic Exercise Activity 11: Screw Bar UD/RD x 10  Therapeutic Exercise Activity 12: Digit flex 10 x 4 7#    Manual Therapy:       14 MINUTES  Manual Therapy  Manual Therapy Activity 2: IASTM to volar forearm followed by STM.  Manual Therapy Activity 3: Trigger release to volar forearm        Education: Pt educated on completing wrist PRE with bands.        Jacobo Hayden, OT, CHT    Active       OT Goals       Pt will demonstrate in improve in function by improving to 20.0 on the quick dash to signify increased independence with ADL'S with decreased pain.         Start:  02/20/25    Expected End:  08/20/25            Patient will improve thumb  ROM in order to perform self-care, household, work and or leisure tasks, Right Thumb ROM; MP 60   Degrees, IP   60    Degrees, Radial Abduction  45       Degrees, Real Abduction  45  Degrees.         Start:  02/20/25    Expected End:  08/20/25            Patient will improve hand strength in order to perform self-care, household, work and or leisure tasks Right  Hand Strength Goal;       35    #, Key Pinch     8    #,       Start:  02/20/25    Expected End:  08/20/25            Patient will improve strengthening in order to perform self-care, household, work and or leisure tasks, Right Wrist Strength Goal   Extension   /5,  Flexion    /5, Ulnar deviation /5, Radial deviation   /5  4+/5 globally       Start:  02/20/25    Expected End:  08/20/25            Patient will improve wrist range of motion in order to perform self-care, household, work and or leisure tasks. Wrist Extension 65,             Flexion 60,                     Start:  02/20/25    Expected End:  08/20/25               OT Problem       PATIENT WILL DEMONSTRATE INDEPENDENCE IN HOME PROGRAM FOR SUPPORT OF PROGRESSION       Start:  02/20/25    Expected End:  08/20/25            PATIENT WILL REPORT PAIN OF 0-2/10 DEMONSTRATING A REDUCTION OF OVERALL PAIN       Start:  02/20/25    Expected End:  08/20/25

## 2025-04-04 ENCOUNTER — TREATMENT (OUTPATIENT)
Dept: OCCUPATIONAL THERAPY | Facility: CLINIC | Age: 75
End: 2025-04-04
Payer: MEDICARE

## 2025-04-04 DIAGNOSIS — M79.641 HAND PAIN, RIGHT: ICD-10-CM

## 2025-04-04 DIAGNOSIS — M25.641 JOINT STIFFNESS OF HAND, RIGHT: ICD-10-CM

## 2025-04-04 PROCEDURE — 97110 THERAPEUTIC EXERCISES: CPT | Mod: GO

## 2025-04-04 PROCEDURE — 97035 APP MDLTY 1+ULTRASOUND EA 15: CPT | Mod: GO

## 2025-04-04 NOTE — PROGRESS NOTES
Occupational Therapy  Occupational Therapy Treatment    Patient Name: Joana Elizabeth  MRN: [id number]  Today's Date: 4/4/2025  Time Calculation  Start Time: 1303  Stop Time: 1348  Time Calculation (min): 45 min    Visit # 9       General Comment: Visit 9, no auth    Assessment: Tissue mobility increased , Joint mobility/ROM increased, flexibility, fatigue increased.    Pt completed session with some difficulty.           Plan: Progress with POC, As tolerated and monitor home program.        Current Problem  1. Joint stiffness of hand, right  Follow Up In Occupational Therapy      2. Hand pain, right  Follow Up In Occupational Therapy          Precautions   N/A    Subjective:   Patient reports     Pain   0/10    Performing HEP?: Yes    Objective:   All exercises held for 10 seconds unless noted otherwise   Treatments:   This therapist instructed and demonstrated interventions to patient, patient completed the following under direct supervision of this therapist:  Modalities:        8MINUTES  3.3 MHz .8 W c/m2 for 8 minutes to in preparation for ROM/strength exercises   To volar wrist.     Therapeutic Exercise:   min  37 MINUTES  Therapeutic Exercise  Therapeutic Exercise Activity 1: Wrist flexion x 10  Therapeutic Exercise Activity 2: Wrist extension x 10  Therapeutic Exercise Activity 3: UD x5  Therapeutic Exercise Activity 4: RD x 5  Therapeutic Exercise Activity 7: Index finger press into red resistive ball.  Therapeutic Exercise Activity 9: Flexbar supination 2 x 10 red bar  Therapeutic Exercise Activity 10: Flexbar pronation 2x 10 red bar  Therapeutic Exercise Activity 13: Green flexbar downward x  10 reps 2 sets  Therapeutic Exercise Activity 14: Green flexbar upward rotation x 10 2 sets  Therapeutic Exercise Activity 19: Mass grasp x 10 green.              Jacobo Hayden, OT, CHT    Active       OT Goals       Pt will demonstrate in improve in function by improving to 20.0 on the quick dash to signify  increased independence with ADL'S with decreased pain.         Start:  02/20/25    Expected End:  08/20/25            Patient will improve thumb  ROM in order to perform self-care, household, work and or leisure tasks, Right Thumb ROM; MP 60   Degrees, IP   60    Degrees, Radial Abduction  45       Degrees, Real Abduction  45  Degrees.         Start:  02/20/25    Expected End:  08/20/25            Patient will improve hand strength in order to perform self-care, household, work and or leisure tasks Right  Hand Strength Goal;       35    #, Key Pinch     8    #,       Start:  02/20/25    Expected End:  08/20/25            Patient will improve strengthening in order to perform self-care, household, work and or leisure tasks, Right Wrist Strength Goal   Extension   /5,  Flexion    /5, Ulnar deviation /5, Radial deviation   /5  4+/5 globally       Start:  02/20/25    Expected End:  08/20/25            Patient will improve wrist range of motion in order to perform self-care, household, work and or leisure tasks. Wrist Extension 65,             Flexion 60,                     Start:  02/20/25    Expected End:  08/20/25               OT Problem       PATIENT WILL DEMONSTRATE INDEPENDENCE IN HOME PROGRAM FOR SUPPORT OF PROGRESSION       Start:  02/20/25    Expected End:  08/20/25            PATIENT WILL REPORT PAIN OF 0-2/10 DEMONSTRATING A REDUCTION OF OVERALL PAIN       Start:  02/20/25    Expected End:  08/20/25

## 2025-04-08 ENCOUNTER — TREATMENT (OUTPATIENT)
Dept: OCCUPATIONAL THERAPY | Facility: CLINIC | Age: 75
End: 2025-04-08
Payer: MEDICARE

## 2025-04-08 DIAGNOSIS — M79.641 HAND PAIN, RIGHT: ICD-10-CM

## 2025-04-08 DIAGNOSIS — M25.641 JOINT STIFFNESS OF HAND, RIGHT: ICD-10-CM

## 2025-04-08 PROCEDURE — 97110 THERAPEUTIC EXERCISES: CPT | Mod: GO

## 2025-04-08 PROCEDURE — 97530 THERAPEUTIC ACTIVITIES: CPT | Mod: GO

## 2025-04-08 PROCEDURE — 97140 MANUAL THERAPY 1/> REGIONS: CPT | Mod: GO

## 2025-04-08 NOTE — PROGRESS NOTES
Occupational Therapy  Occupational Therapy Treatment/re-check    Patient Name: Joana Elizabeth  MRN: [id number]  Today's Date: 4/8/2025  Time Calculation  Start Time: 1317  Stop Time: 1358  Time Calculation (min): 41 min    Visit # 10       General Comment: Visit 10    Assessment: Pt would benefit from continued therapy to address hand strength and end range motion of thumb. Pt has met goals of wrist ROM and strength.        Plan: Progress with POC, As tolerated and monitor home program. Plan to continue to address stiffness/discomfort in palm.        Current Problem  1. Joint stiffness of hand, right  Follow Up In Occupational Therapy      2. Hand pain, right  Follow Up In Occupational Therapy          Precautions N/A       Subjective:   Patient reports I feel like I am coming along.     Pain   0/10    Performing HEP?: Yes    Objective:   All exercises held for 10 seconds unless noted otherwise   Treatments:   This therapist instructed and demonstrated interventions to patient, patient completed the following under direct supervision of this therapist:      Therapeutic Exercise:   min  13 MINUTES  Therapeutic Exercise  Therapeutic Exercise Activity 1: thumb composite flexion x 10  Therapeutic Exercise Activity 2: Thumb PROM composite x 10  Therapeutic Exercise Activity 3: Real abd/add x 10 PROM  followed by 10 reps AROM  Therapeutic Exercise Activity 20: Digit flex 10 x 4 positions 7.0#    Manual Therapy  Manual Therapy Activity 1: Massage to palm with and without mini massager  15 min    Therapeutic Activity:      13 MINUTES  Therapeutic Activity  Therapeutic Activity Performed: Yes  Therapeutic Activity 1: Re-check      Education: Reviewed PROM/AROM of thumb IP.       Jacobo Hayden, OT, CHT    Active       OT Goals       Pt will demonstrate in improve in function by improving to 20.0 on the quick dash to signify increased independence with ADL'S with decreased pain.   (Progressing)       Start:  02/20/25     Expected End:  08/20/25         Goal Note       20.45              Patient will improve thumb  ROM in order to perform self-care, household, work and or leisure tasks, Right Thumb ROM; MP 60   Degrees, IP   60    Degrees, Radial Abduction  45       Degrees, Real Abduction  45  Degrees.   (Progressing)       Start:  02/20/25    Expected End:  08/20/25         Goal Note       MP 57 Degrees, IP 40 Degrees, Radial Abduction 49 Degrees, Real Abduction 43 Degrees               Patient will improve hand strength in order to perform self-care, household, work and or leisure tasks Right  Hand Strength Goal;       35    #, Key Pinch     8    #, (Progressing)       Start:  02/20/25    Expected End:  08/20/25         Goal Note        29 4/8/25  Key 9.3              Patient will improve strengthening in order to perform self-care, household, work and or leisure tasks, Right Wrist Strength Goal   Extension   /5,  Flexion    /5, Ulnar deviation /5, Radial deviation   /5  4+/5 globally (Met)       Start:  02/20/25    Expected End:  08/20/25    Resolved:  04/08/25      Goal Note       5/5 globally              Patient will improve wrist range of motion in order to perform self-care, household, work and or leisure tasks. Wrist Extension 65,             Flexion 60,               (Met)       Start:  02/20/25    Expected End:  08/20/25    Resolved:  04/08/25      Goal Note       Flexion 64  Ext 73                 OT Problem       PATIENT WILL DEMONSTRATE INDEPENDENCE IN HOME PROGRAM FOR SUPPORT OF PROGRESSION (Progressing)       Start:  02/20/25    Expected End:  08/20/25            PATIENT WILL REPORT PAIN OF 0-2/10 DEMONSTRATING A REDUCTION OF OVERALL PAIN (Met)       Start:  02/20/25    Expected End:  08/20/25    Resolved:  04/08/25      Goal Note       Pt reports no pain above a 2 in last 2 weeks.

## 2025-04-09 DIAGNOSIS — E11.8 CONTROLLED TYPE 2 DIABETES MELLITUS WITH COMPLICATION, WITHOUT LONG-TERM CURRENT USE OF INSULIN (MULTI): ICD-10-CM

## 2025-04-09 DIAGNOSIS — M47.12 CERVICAL ARTHRITIS WITH MYELOPATHY: ICD-10-CM

## 2025-04-09 RX ORDER — GABAPENTIN 300 MG/1
300 CAPSULE ORAL 2 TIMES DAILY
Qty: 180 CAPSULE | Refills: 3 | Status: SHIPPED | OUTPATIENT
Start: 2025-04-09

## 2025-04-09 RX ORDER — METFORMIN HYDROCHLORIDE 1000 MG/1
1000 TABLET ORAL
Qty: 180 TABLET | Refills: 3 | Status: SHIPPED | OUTPATIENT
Start: 2025-04-09

## 2025-04-11 ENCOUNTER — APPOINTMENT (OUTPATIENT)
Dept: PHARMACY | Facility: HOSPITAL | Age: 75
End: 2025-04-11
Payer: MEDICARE

## 2025-04-11 DIAGNOSIS — E11.8 CONTROLLED TYPE 2 DIABETES MELLITUS WITH COMPLICATION, WITHOUT LONG-TERM CURRENT USE OF INSULIN (MULTI): ICD-10-CM

## 2025-04-11 PROCEDURE — RXMED WILLOW AMBULATORY MEDICATION CHARGE

## 2025-04-11 RX ORDER — TIRZEPATIDE 5 MG/.5ML
5 INJECTION, SOLUTION SUBCUTANEOUS WEEKLY
Qty: 2 ML | Refills: 0 | Status: SHIPPED | OUTPATIENT
Start: 2025-04-11

## 2025-04-11 NOTE — PROGRESS NOTES
"  Clinical Pharmacy Appointment    Patient ID: Joana Elizabeth is a 75 y.o. female who presents for Diabetes.      Referring Provider: January Garcia DO  PCP: January Garcia DO   Last visit with PCP: 3/18/2025   Next visit with PCP: 6/20/2025    Subjective       Interval History  Since last visit, Mounjaro enrolled in  patient assistance and patient initiated at 2.5mg weekly dose. Administered 2 doses so far.   Tolerating well no GI side effects. No longer with loose stool (prev experienced with metformin alone)  Appetite slightly reduced. Weight is stable (home weight 214 lbs)  Reports she is implementing \"no sweets, no seconds, no snacks\"     [facility] CHF, HTN, HLD, WILLIAM, GERD    DIABETES MELLITUS TYPE 2:    Diagnosed with diabetes:  ~30 years  Known diabetic complications: none.  Does patient follow with Endocrinology: No  Last optometry exam: twice yearly   Most recent visit in Podiatry: none-- patient denies sores or cuts on feet today      Current diabetic medications include:  Metformin 1000mg twice daily   Jardiance 10mg daily   Mounjaro 2.5mg subcutaneous once weekly (Monday)    Adverse Effects: Diarrhea    Past diabetic medications include:  Does not recall     Glucose Readings:  Glucometer  Patient tests BG 1-2 times per day    Current home BG readings (mg/dL): 251, 209, 216, 234, 248, 250, 196, 185, 200, 235  Previous home BG readings (mg/dL): 200-300 AM FBG    Any episodes of hypoglycemia? No  Did patient treat episode of hypoglycemia appropriately? N/A  Does the patient have a prescription for ready-to-use Glucagon? Not on insulin    Lifestyle:  Diet: 2 large meals/day. 1 PM and 6 PM.   BK: nothing or yogurt   LN+ DN: Carb dense; past week incorporating more meat and vegs. Was eating a lot of chocolate blueberries.  Snacks: None  Drinks: Crystal light water (zero sugar), coffee 2-3 cups in AM  Exercise: does not exercise  Activity type: Type of Exercise : walking 4 K steps per day around her " house daily   Is limited by: back pain and joint pain  Tobacco history: Denies   Alcohol use: Denies     Secondary Prevention:  Statin? Yes  ACE-I/ARB? Yes (Entresto)  Aspirin? Yes    Pertinent PMH Review:  PMH of Pancreatitis: No  PMH of Retinopathy: No  PMH of Urinary Tract Infections: No  PMH of MTC: No  PMH of MEN2: No  UACR/EGFR in last year?: Yes  POC ALBUMIN /CREATININE RATIO MANUALLY ENTERED   Date Value Ref Range Status   05/14/2024 <30 <30 ug/mg Creat Final   10/10/2023 30 - 300 (A) <30 ug/mg Creat Final     Albumin/Creatine Ratio   Date Value Ref Range Status   01/27/2022 SEE COMMENT 0.0 - 30.0 ug/mg crt Final     Comment:     One or more analytes used in this calculation   is outside of the analytical measurement range.  Calculation cannot be performed.       Drug Interactions  No relevant drug interactions were noted.    Medication System Management  Patient's preferred pharmacy: [facility]   Adherence/Organization: No barriers   Affordability/Accessibility: Enrolled in [facility] through 2/24/2026    Objective   Allergies   Allergen Reactions    Shellfish Containing Products Anaphylaxis    Codeine Nausea And Vomiting and Other     GI Intolerance    Tramadol Other and Itching     tongue blistered    Celecoxib Rash     Social History     Social History Narrative    Not on file      Medication Review  Current Outpatient Medications   Medication Instructions    albuterol 90 mcg/actuation inhaler 1 puff, Every 4 hours PRN    aspirin 81 mg capsule 1 tablet, Daily    atorvastatin (LIPITOR) 40 mg, oral, Daily    biotin 10 mg tablet 1 tablet, Daily    blood sugar diagnostic (Truetrack Test) strip TEST twice a day    calcium carbonate-vitamin D3 600 mg-20 mcg (800 unit) tablet Caltrate with Vitamin D3 600 mg (1,500 mg)-800 unit tablet    coenzyme Q-10 200 mg capsule 1 capsule, Daily    cyanocobalamin (VITAMIN B-12) 250 mcg, Daily    escitalopram (LEXAPRO) 20 mg, oral, Daily    ferrous sulfate 325 (65 Fe) MG tablet 65 mg  of iron, 2 times daily    fexofenadine (Allegra) 180 mg tablet 1 tablet, Daily    fluticasone (Flonase) 50 mcg/actuation nasal spray 1 spray, Daily    folic acid (FOLVITE) 1 mg, Daily    furosemide (LASIX) 20 mg, oral, Daily    gabapentin (NEURONTIN) 100 mg, 2 times daily    gabapentin (NEURONTIN) 300 mg, oral, 2 times daily    hydroxychloroquine (Plaquenil) 200 mg tablet oral, 2 times daily    Jardiance 10 mg, oral, Daily    levothyroxine (SYNTHROID, LEVOXYL) 100 mcg, oral, Daily    magnesium oxide (Mag-Ox) 400 mg tablet 2 tablets, Daily    meloxicam (MOBIC) 7.5 mg, oral, Daily    metFORMIN (GLUCOPHAGE) 1,000 mg, oral, 2 times daily (morning and late afternoon)    methocarbamol (Robaxin) 500 mg tablet Take 1-2 tabs every 8 hours as needed for spasms    metoprolol succinate XL (TOPROL-XL) 100 mg, oral, Daily    Mounjaro 5 mg, subcutaneous, Weekly    multivit/folic acid/vit K1 (WOMEN'S 50 PLUS ADVANCED ORAL) 1 tablet, Daily    pantoprazole (PROTONIX) 40 mg, oral, Daily    sacubitriL-valsartan (Entresto) 24-26 mg tablet 1 tablet, oral, 2 times daily      Vitals  BP Readings from Last 2 Encounters:   03/18/25 136/78   02/05/25 120/78     BMI Readings from Last 1 Encounters:   03/18/25 38.39 kg/m²      Labs  Lab Results   Component Value Date    HGBA1C 10.5 (A) 03/18/2025    HGBA1C 8.2 (A) 12/03/2024    HGBA1C 8.6 (H) 08/23/2024     Lab Results   Component Value Date    CALCIUM 9.5 08/23/2024     08/23/2024    K 4.3 08/23/2024    CO2 26 08/23/2024     08/23/2024    BUN 30 (H) 08/23/2024    CREATININE 0.93 08/23/2024    EGFR 65 08/23/2024     Lab Results   Component Value Date    ALT 14 08/23/2024    AST 15 08/23/2024    ALKPHOS 49 08/23/2024    BILITOT 0.5 08/23/2024     Lab Results   Component Value Date    TRIG 479 (H) 08/23/2024    CHOL 163 08/23/2024    LDLF 46 01/26/2022    LDLCALC  08/23/2024      Comment:      The calculation of LDL and VLDL are inaccurate when the Triglycerides are greater than 400  mg/dL or when the patient is non-fasting. If LDL measurement is necessary contact the testing laboratory for an alternative LDL assay.                                  Near   Borderline      AGE      Desirable  Optimal    High     High     Very High     0-19 Y     0 - 109     ---    110-129   >/= 130     ----    20-24 Y     0 - 119     ---    120-159   >/= 160     ----      >24 Y     0 -  99   100-129  130-159   160-189     >/=190      HDL 35.8 08/23/2024     Lab Results   Component Value Date    MICROALBCREA <30 05/14/2024     Wt Readings from Last 3 Encounters:   03/18/25 98.3 kg (216 lb 11.2 oz)   02/05/25 97.5 kg (215 lb)   01/17/25 101 kg (222 lb)      There is no height or weight on file to calculate BMI.     Assessment/Plan   Problem List Items Addressed This Visit       Controlled type 2 diabetes mellitus with complication, without long-term current use of insulin (Multi)    Relevant Medications    tirzepatide (Mounjaro) 5 mg/0.5 mL pen injector    Other Relevant Orders    Referral to Clinical Pharmacy       Patient's goal A1c is < 7.5%.  Is pt at goal? No, 10.5%  Patient's SMBGs are uncontrolled.       Medication Changes:  CONTINUE  Metformin 1000mg twice daily    INCREASE   Mounjaro to 5mg once weekly     Future Considerations:  Patients goal is to reduce pill burden. Pending adequate glycemic control and tolerance with GLP1a dose escalations will aim to stop metformin as able.    Monitoring and Education:  Counseled patient on Mounjaro MOA, expectations, side effects, duration of therapy, administration, and monitoring parameters.  Provided detailed dosing and administration counseling to ensure proper technique.   Reviewed Mounjaro titration schedule, starting with 2.5 mg once weekly to a goal of 15 mg once weekly if tolerated  Counseled patient on the benefits of GLP-1ra glycemic control and weight loss  Reviewed storage requirements of Mounjaro when not in use, and when to administer the medication if  a dose is missed.  Advised patient that they may experience improved satiety after meals and portion sizes of meals may be reduced as doses of Mounjaro increase.     Clinical Pharmacist follow-up:  5/9 0920, Telehealth visit    Continue all meds under the continuation of care with the referring provider and clinical pharmacy team.    Thank you,  Alyssa Munoz, PharmD  Clinical Pharmacy Specialist Primary Care  [phone]     Verbal consent to manage patient's drug therapy was obtained from the patient. They were informed they may decline to participate or withdraw from participation in pharmacy services at any time.

## 2025-04-15 ENCOUNTER — PHARMACY VISIT (OUTPATIENT)
Dept: PHARMACY | Facility: CLINIC | Age: 75
End: 2025-04-15
Payer: COMMERCIAL

## 2025-04-15 ENCOUNTER — TREATMENT (OUTPATIENT)
Dept: OCCUPATIONAL THERAPY | Facility: CLINIC | Age: 75
End: 2025-04-15
Payer: MEDICARE

## 2025-04-15 DIAGNOSIS — M25.641 JOINT STIFFNESS OF HAND, RIGHT: ICD-10-CM

## 2025-04-15 DIAGNOSIS — M79.641 HAND PAIN, RIGHT: ICD-10-CM

## 2025-04-15 PROCEDURE — 97110 THERAPEUTIC EXERCISES: CPT | Mod: GO

## 2025-04-15 NOTE — PROGRESS NOTES
Occupational Therapy  Occupational Therapy Treatment    Patient Name: Joana Elizabeth  MRN: [id number]  Today's Date: 4/15/2025  Time Calculation  Start Time: 1403  Stop Time: 1450  Time Calculation (min): 47 min    Visit # 11       General Comment: Visit 11    Assessment: Joint mobility/ROM increased, flexibility increased, muscle fatigue increased.    Pt completed session with some difficulty.           Plan: Plan to discharge next session.        Current Problem  1. Joint stiffness of hand, right  Follow Up In Occupational Therapy      2. Hand pain, right  Follow Up In Occupational Therapy          Precautions N/A       Subjective:   Patient reports I have been stretching the thumb.     Pain   No number given.    Performing HEP?: Yes    Objective:   All exercises held for 10 seconds unless noted otherwise   Treatments:   This therapist instructed and demonstrated interventions to patient, patient completed the following under direct supervision of this therapist:  Modalities:        8MINUTES  3.3 MHz .8 W c/m2 for 8 minutes to in preparation for ROM/strength exercises   To volar and dorsal thumb IP       Therapeutic Exercise:   min  39 MINUTES  Therapeutic Exercise  Therapeutic Exercise Activity 1: Thumb composite flexion x 10  Therapeutic Exercise Activity 2: Thumb PROM composite x 10  Therapeutic Exercise Activity 3: Thumb IP flexion x 10  Therapeutic Exercise Activity 5: Real ABD x 5 PROM, then 10 AROM  Therapeutic Exercise Activity 6: Mass Grasp x 10 coral  Therapeutic Exercise Activity 7: Putty Rolling  x 10  Therapeutic Exercise Activity 8: Digit ext        Education: Pt educated on digit extension with putty.       Jacobo Hayden, OT, CHT    Active       OT Goals       Pt will demonstrate in improve in function by improving to 20.0 on the quick dash to signify increased independence with ADL'S with decreased pain.   (Progressing)       Start:  02/20/25    Expected End:  08/20/25         Goal Note        20.45              Patient will improve thumb  ROM in order to perform self-care, household, work and or leisure tasks, Right Thumb ROM; MP 60   Degrees, IP   60    Degrees, Radial Abduction  45       Degrees, Real Abduction  45  Degrees.   (Progressing)       Start:  02/20/25    Expected End:  08/20/25         Goal Note       MP 57 Degrees, IP 40 Degrees, Radial Abduction 49 Degrees, Real Abduction 43 Degrees               Patient will improve hand strength in order to perform self-care, household, work and or leisure tasks Right  Hand Strength Goal;       35    #, Key Pinch     8    #, (Progressing)       Start:  02/20/25    Expected End:  08/20/25         Goal Note        29 4/8/25  Key 9.3              Patient will improve strengthening in order to perform self-care, household, work and or leisure tasks, Right Wrist Strength Goal   Extension   /5,  Flexion    /5, Ulnar deviation /5, Radial deviation   /5  4+/5 globally (Met)       Start:  02/20/25    Expected End:  08/20/25    Resolved:  04/08/25      Goal Note       5/5 globally              Patient will improve wrist range of motion in order to perform self-care, household, work and or leisure tasks. Wrist Extension 65,             Flexion 60,               (Met)       Start:  02/20/25    Expected End:  08/20/25    Resolved:  04/08/25      Goal Note       Flexion 64  Ext 73                 OT Problem       PATIENT WILL DEMONSTRATE INDEPENDENCE IN HOME PROGRAM FOR SUPPORT OF PROGRESSION (Progressing)       Start:  02/20/25    Expected End:  08/20/25            PATIENT WILL REPORT PAIN OF 0-2/10 DEMONSTRATING A REDUCTION OF OVERALL PAIN (Met)       Start:  02/20/25    Expected End:  08/20/25    Resolved:  04/08/25      Goal Note       Pt reports no pain above a 2 in last 2 weeks.

## 2025-04-22 ENCOUNTER — APPOINTMENT (OUTPATIENT)
Dept: OCCUPATIONAL THERAPY | Facility: CLINIC | Age: 75
End: 2025-04-22
Payer: MEDICARE

## 2025-04-23 ENCOUNTER — TREATMENT (OUTPATIENT)
Dept: OCCUPATIONAL THERAPY | Facility: CLINIC | Age: 75
End: 2025-04-23
Payer: MEDICARE

## 2025-04-23 DIAGNOSIS — M79.641 HAND PAIN, RIGHT: ICD-10-CM

## 2025-04-23 DIAGNOSIS — M25.641 JOINT STIFFNESS OF HAND, RIGHT: ICD-10-CM

## 2025-04-23 PROCEDURE — 97530 THERAPEUTIC ACTIVITIES: CPT | Mod: GO

## 2025-04-23 PROCEDURE — 97140 MANUAL THERAPY 1/> REGIONS: CPT | Mod: GO

## 2025-04-23 NOTE — PROGRESS NOTES
Occupational Therapy  Occupational Therapy Treatment with discharge    Patient Name: Joana Elizabeth  MRN: [id number]  Treatment Date: 4/23/2025  Visit # 12  Time Calculation  Start Time: 1036  Stop Time: 1106  Time Calculation (min): 30 min     General  General Comment: Visit 12    TREATING DIAGNOSIS:  1. Joint stiffness of hand, right  Follow Up In Occupational Therapy      2. Hand pain, right  Follow Up In Occupational Therapy          ASSESSMENT     Joana Elizabeth has made very good progress toward her goals.  He strength is 0.5 # away from all goals achieved.        PLAN:  To continue with her HEP for strengthening, ROM and thenar eminence trigger point release.  To discharge OT services this date.        Resolved       OT Goals       Pt will demonstrate in improve in function by improving to 20.0 on the quick dash to signify increased independence with ADL'S with decreased pain.   (Met)       Start:  02/20/25    Expected End:  08/20/25    Resolved:  04/23/25      Goal Note       4/23/2025 Quick Dash 9.09%              Patient will improve thumb  ROM in order to perform self-care, household, work and or leisure tasks, Right Thumb ROM; MP 60   Degrees, IP   60    Degrees, Radial Abduction  45       Degrees, Real Abduction  45  Degrees.   (Met)       Start:  02/20/25    Expected End:  08/20/25    Resolved:  04/23/25         Patient will improve hand strength in order to perform self-care, household, work and or leisure tasks Right  Hand Strength Goal;       35    #, Key Pinch     8    #, (Met)       Start:  02/20/25    Expected End:  08/20/25    Resolved:  04/23/25      Goal Note       4/23/2025   33#; 3 jaw chrissie 10.25              Patient will improve strengthening in order to perform self-care, household, work and or leisure tasks, Right Wrist Strength Goal   Extension   /5,  Flexion    /5, Ulnar deviation /5, Radial deviation   /5  4+/5 globally (Met)       Start:  02/20/25    Expected End:   08/20/25    Resolved:  04/08/25      Goal Note       5/5 globally              Patient will improve wrist range of motion in order to perform self-care, household, work and or leisure tasks. Wrist Extension 65,             Flexion 60,               (Met)       Start:  02/20/25    Expected End:  08/20/25    Resolved:  04/08/25      Goal Note       Flexion 64  Ext 73                 OT Problem       PATIENT WILL DEMONSTRATE INDEPENDENCE IN HOME PROGRAM FOR SUPPORT OF PROGRESSION (Met)       Start:  02/20/25    Expected End:  08/20/25    Resolved:  04/23/25         PATIENT WILL REPORT PAIN OF 0-2/10 DEMONSTRATING A REDUCTION OF OVERALL PAIN (Met)       Start:  02/20/25    Expected End:  08/20/25    Resolved:  04/08/25      Goal Note       Pt reports no pain above a 2 in last 2 weeks.                  Precautions  Precautions Comment: none    SUBJECTIVE:     Performing HEP?: Yes       Patient completed treatment with some difficulty and max effort.    OBJECTIVE:  Hand Objective:        Right Hand Strength -  (lbs)  Handle Setting 2 (lbs): 33 lbs (33, 33)  Right Hand Strength - Pinch (lbs)  Lateral (lbs): 10.25 lbs (10.5, 10)              Quick Dash score 9.09%           Treatments:   This therapist instructed and demonstrated interventions to patient, patient completed the following under direct supervision of this therapist:    8         MINUTES Ultra sound for 8 minutes, 0.8 w/cm2, 3.3 mHz,  100 % to right thumb           12 MINUTES  Manual Therapy  Manual Therapy Activity 1: After US, STM to thenar eminance w/ tools  10 MINUTES  Therapeutic Activity  Therapeutic Activity 1: Recheck completed with d/c.      Nadira Burleson, OTR/L #[id number]   4/23/2025

## 2025-05-09 ENCOUNTER — APPOINTMENT (OUTPATIENT)
Dept: PHARMACY | Facility: HOSPITAL | Age: 75
End: 2025-05-09
Payer: MEDICARE

## 2025-05-09 DIAGNOSIS — E11.8 CONTROLLED TYPE 2 DIABETES MELLITUS WITH COMPLICATION, WITHOUT LONG-TERM CURRENT USE OF INSULIN (MULTI): ICD-10-CM

## 2025-05-09 PROCEDURE — RXMED WILLOW AMBULATORY MEDICATION CHARGE

## 2025-05-09 RX ORDER — TIRZEPATIDE 7.5 MG/.5ML
7.5 INJECTION, SOLUTION SUBCUTANEOUS WEEKLY
Qty: 2 ML | Refills: 0 | Status: SHIPPED | OUTPATIENT
Start: 2025-05-09

## 2025-05-09 NOTE — PROGRESS NOTES
"  Clinical Pharmacy Appointment    Patient ID: Joana Elizabeth is a 75 y.o. female who presents for Diabetes.      Referring Provider: January Garcia DO  PCP: January Garcia DO   Last visit with PCP: 3/18/2025   Next visit with PCP: 6/20/2025    Subjective       Interval History  Since last visit, Mounjaro increased to 5mg weekly (2 doses so far)  Tolerating well. Mild constipation, BM daily without OTC meds. Otherwise no GI side effects.   Appetite slightly reduced. Weight is gradually reducing. Reported home weight 205 to 209 lbs (214 - 219 lbs at baseline)  Reports she is implementing \"no sweets, no seconds, no snacks\"     PMH CHF, HTN, HLD, WILLIAM, GERD    DIABETES MELLITUS TYPE 2:    Diagnosed with diabetes:  ~30 years  Known diabetic complications: none.  Does patient follow with Endocrinology: No  Last optometry exam: twice yearly   Most recent visit in Podiatry: none-- patient denies sores or cuts on feet today      Current diabetic medications include:  Metformin 1000mg twice daily   Jardiance 10mg daily   Mounjaro 5mg subcutaneous once weekly (Monday)    Adverse Effects: Diarrhea    Past diabetic medications include:  Does not recall     Glucose Readings:  Glucometer  Patient tests BG 1-2 times per day    Current home BG readings (mg/dL):   AM FBG [id number], highest 223 x 1 in past month   Postprandial 133, 123    Previous home BG readings (mg/dL): 251, 209, 216, 234, 248, 250, 196, 185, 200, 235    Any episodes of hypoglycemia? No  Did patient treat episode of hypoglycemia appropriately? N/A  Does the patient have a prescription for ready-to-use Glucagon? Not on insulin    Lifestyle:  Diet: 2 large meals/day. 1 PM and 6 PM.   BK: nothing or yogurt   LN+ DN: Carb dense; past week incorporating more meat and vegs. Was eating a lot of chocolate blueberries.  Snacks: None  Drinks: Crystal light water (zero sugar), coffee 2-3 cups in AM  Exercise: does not exercise  Activity type: Type of Exercise " : walking 4 K steps per day around her house daily   Is limited by: back pain and joint pain  Tobacco history: Denies   Alcohol use: Denies     Secondary Prevention:  Statin? Yes  ACE-I/ARB? Yes (Entresto)  Aspirin? Yes    Pertinent PMH Review:  PMH of Pancreatitis: No  PMH of Retinopathy: No  PMH of Urinary Tract Infections: No  PMH of MTC: No  PMH of MEN2: No  UACR/EGFR in last year?: Yes  POC ALBUMIN /CREATININE RATIO MANUALLY ENTERED   Date Value Ref Range Status   05/14/2024 <30 <30 ug/mg Creat Final   10/10/2023 30 - 300 (A) <30 ug/mg Creat Final     Albumin/Creatine Ratio   Date Value Ref Range Status   01/27/2022 SEE COMMENT 0.0 - 30.0 ug/mg crt Final     Comment:     One or more analytes used in this calculation   is outside of the analytical measurement range.  Calculation cannot be performed.       Drug Interactions  No relevant drug interactions were noted.    Medication System Management  Patient's preferred pharmacy: [facility]   Adherence/Organization: No barriers   Affordability/Accessibility: Enrolled in [facility] through 2/24/2026    Objective   Allergies   Allergen Reactions    Shellfish Containing Products Anaphylaxis    Codeine Nausea And Vomiting and Other     GI Intolerance    Tramadol Other and Itching     tongue blistered    Celecoxib Rash     Social History     Social History Narrative    Not on file      Medication Review  Current Outpatient Medications   Medication Instructions    albuterol 90 mcg/actuation inhaler 1 puff, Every 4 hours PRN    aspirin 81 mg capsule 1 tablet, Daily    atorvastatin (LIPITOR) 40 mg, oral, Daily    biotin 10 mg tablet 1 tablet, Daily    blood sugar diagnostic (Truetrack Test) strip TEST twice a day    calcium carbonate-vitamin D3 600 mg-20 mcg (800 unit) tablet Caltrate with Vitamin D3 600 mg (1,500 mg)-800 unit tablet    coenzyme Q-10 200 mg capsule 1 capsule, Daily    cyanocobalamin (VITAMIN B-12) 250 mcg, Daily    escitalopram (LEXAPRO) 20 mg, oral, Daily     ferrous sulfate 325 (65 Fe) MG tablet 65 mg of iron, 2 times daily    fexofenadine (Allegra) 180 mg tablet 1 tablet, Daily    fluticasone (Flonase) 50 mcg/actuation nasal spray 1 spray, Daily    folic acid (FOLVITE) 1 mg, Daily    furosemide (LASIX) 20 mg, oral, Daily    gabapentin (NEURONTIN) 100 mg, 2 times daily    gabapentin (NEURONTIN) 300 mg, oral, 2 times daily    hydroxychloroquine (Plaquenil) 200 mg tablet oral, 2 times daily    Jardiance 10 mg, oral, Daily    levothyroxine (SYNTHROID, LEVOXYL) 100 mcg, oral, Daily    magnesium oxide (Mag-Ox) 400 mg tablet 2 tablets, Daily    meloxicam (MOBIC) 7.5 mg, oral, Daily    metFORMIN (GLUCOPHAGE) 1,000 mg, oral, 2 times daily (morning and late afternoon)    methocarbamol (Robaxin) 500 mg tablet Take 1-2 tabs every 8 hours as needed for spasms    metoprolol succinate XL (TOPROL-XL) 100 mg, oral, Daily    Mounjaro 5 mg, subcutaneous, Weekly    multivit/folic acid/vit K1 (WOMEN'S 50 PLUS ADVANCED ORAL) 1 tablet, Daily    pantoprazole (PROTONIX) 40 mg, oral, Daily    sacubitriL-valsartan (Entresto) 24-26 mg tablet 1 tablet, oral, 2 times daily      Vitals  BP Readings from Last 2 Encounters:   03/18/25 136/78   02/05/25 120/78     BMI Readings from Last 1 Encounters:   03/18/25 38.39 kg/m²      Labs  Lab Results   Component Value Date    HGBA1C 10.5 (A) 03/18/2025    HGBA1C 8.2 (A) 12/03/2024    HGBA1C 8.6 (H) 08/23/2024     Lab Results   Component Value Date    CALCIUM 9.5 08/23/2024     08/23/2024    K 4.3 08/23/2024    CO2 26 08/23/2024     08/23/2024    BUN 30 (H) 08/23/2024    CREATININE 0.93 08/23/2024    EGFR 65 08/23/2024     Lab Results   Component Value Date    ALT 14 08/23/2024    AST 15 08/23/2024    ALKPHOS 49 08/23/2024    BILITOT 0.5 08/23/2024     Lab Results   Component Value Date    TRIG 479 (H) 08/23/2024    CHOL 163 08/23/2024    LDLF 46 01/26/2022    LDLCALC  08/23/2024      Comment:      The calculation of LDL and VLDL are inaccurate  when the Triglycerides are greater than 400 mg/dL or when the patient is non-fasting. If LDL measurement is necessary contact the testing laboratory for an alternative LDL assay.                                  Near   Borderline      AGE      Desirable  Optimal    High     High     Very High     0-19 Y     0 - 109     ---    110-129   >/= 130     ----    20-24 Y     0 - 119     ---    120-159   >/= 160     ----      >24 Y     0 -  99   100-129  130-159   160-189     >/=190      HDL 35.8 08/23/2024     Lab Results   Component Value Date    MICROALBCREA <30 05/14/2024     Wt Readings from Last 3 Encounters:   03/18/25 98.3 kg (216 lb 11.2 oz)   02/05/25 97.5 kg (215 lb)   01/17/25 101 kg (222 lb)      There is no height or weight on file to calculate BMI.     Assessment/Plan   Problem List Items Addressed This Visit       Controlled type 2 diabetes mellitus with complication, without long-term current use of insulin (Multi)       Patient's goal A1c is < 7.5%.  Is pt at goal? No, 10.5%  Patient's SMBGs are steadily improving.     Medication Changes:  CONTINUE  Metformin 1000mg twice daily, Jardiance 10mg daily     INCREASE   Mounjaro to 7.5mg once weekly     Future Considerations:  Patients goal is to reduce pill burden. Pending adequate glycemic control and tolerance with GLP1a dose escalations will aim to stop metformin as able.    Monitoring and Education:  Counseled patient on Mounjaro MOA, expectations, side effects, duration of therapy, administration, and monitoring parameters.  Provided detailed dosing and administration counseling to ensure proper technique.   Reviewed Mounjaro titration schedule, starting with 2.5 mg once weekly to a goal of 15 mg once weekly if tolerated  Counseled patient on the benefits of GLP-1ra glycemic control and weight loss  Reviewed storage requirements of Mounjaro when not in use, and when to administer the medication if a dose is missed.  Advised patient that they may experience  improved satiety after meals and portion sizes of meals may be reduced as doses of Mounjaro increase.     Clinical Pharmacist follow-up:  6/6 0920, Telehealth visit    Continue all meds under the continuation of care with the referring provider and clinical pharmacy team.    Thank you,  Alyssa Munoz, PharmD  Clinical Pharmacy Specialist Primary Care  [phone]     Verbal consent to manage patient's drug therapy was obtained from the patient. They were informed they may decline to participate or withdraw from participation in pharmacy services at any time.

## 2025-05-10 ENCOUNTER — PHARMACY VISIT (OUTPATIENT)
Dept: PHARMACY | Facility: CLINIC | Age: 75
End: 2025-05-10
Payer: COMMERCIAL

## 2025-05-12 ENCOUNTER — PHARMACY VISIT (OUTPATIENT)
Dept: PHARMACY | Facility: CLINIC | Age: 75
End: 2025-05-12

## 2025-05-20 ENCOUNTER — HOSPITAL ENCOUNTER (OUTPATIENT)
Dept: CARDIOLOGY | Facility: CLINIC | Age: 75
Discharge: HOME | End: 2025-05-20
Payer: MEDICARE

## 2025-05-20 DIAGNOSIS — I49.02 VENTRICULAR FLUTTER (MULTI): ICD-10-CM

## 2025-05-20 PROCEDURE — 93296 REM INTERROG EVL PM/IDS: CPT

## 2025-06-06 ENCOUNTER — APPOINTMENT (OUTPATIENT)
Dept: PHARMACY | Facility: HOSPITAL | Age: 75
End: 2025-06-06
Payer: MEDICARE

## 2025-06-06 DIAGNOSIS — E11.8 CONTROLLED TYPE 2 DIABETES MELLITUS WITH COMPLICATION, WITHOUT LONG-TERM CURRENT USE OF INSULIN (MULTI): ICD-10-CM

## 2025-06-06 PROCEDURE — RXMED WILLOW AMBULATORY MEDICATION CHARGE

## 2025-06-06 RX ORDER — TIRZEPATIDE 7.5 MG/.5ML
7.5 INJECTION, SOLUTION SUBCUTANEOUS WEEKLY
Qty: 2 ML | Refills: 0 | Status: SHIPPED | OUTPATIENT
Start: 2025-06-06

## 2025-06-06 NOTE — PROGRESS NOTES
"  Clinical Pharmacy Appointment    Patient ID: Joana Elizabeth is a 75 y.o. female who presents for Diabetes.      Referring Provider: January Garcia DO  PCP: January Garcia DO   Last visit with PCP: 3/18/2025   Next visit with PCP: 6/20/2025    Subjective       Interval History  Since last visit, Mounjaro increased to 7.5mg weekly (2 doses so far)  First week at higher dose developed nausea, heartburn and increased gas which has mostly resolved with diet changes this week   Mild constipation, BM daily, controlled with metamucil. Otherwise no GI side effects.   More holiday eating in past few weeks for birthdays, anniversary and memorial day  Appetite slightly reduced. Fluid intake is reduced - increased ice water and reduced coffee.   Weight is stable in past month. Reported home weight 207 lbs (214 - 219 lbs at baseline)  Reports she is implementing \"no sweets, no seconds, no snacks\" except on weekends    PMH CHF, HTN, HLD, WILLIAM, GERD    DIABETES MELLITUS TYPE 2:    Diagnosed with diabetes:  ~30 years  Known diabetic complications: none.  Does patient follow with Endocrinology: No  Last optometry exam: twice yearly   Most recent visit in Podiatry: none-- patient denies sores or cuts on feet today      Current diabetic medications include:  Metformin 1000mg twice daily   Jardiance 10mg daily   Mounjaro 7.5mg subcutaneous once weekly (Monday)    Adverse Effects: mild nausea, heartburn and gas     Past diabetic medications include:  Does not recall     Glucose Readings:  Glucometer  Patient tests BG 1-2 times per day    Current home BG readings (mg/dL): AM FBG  6/1: 180  6/2: 179  6/3: 162  6/4: 174, postprandial 156  6/5: 145  6/6: 156    Previous home BG readings (mg/dL): AM FBG [id number]. Postprandial 133, 123    Any episodes of hypoglycemia? No  Did patient treat episode of hypoglycemia appropriately? N/A  Does the patient have a prescription for ready-to-use Glucagon? Not on " insulin    Lifestyle:  Diet: 2 large meals/day.   BK: nothing or yogurt and piece of bread   LN: skips   DN: Carb dense; meat veg and starch  Snacks: None  Drinks: Crystal light water (zero sugar), coffee 2-3 cups in AM  Exercise: does not exercise  Activity type: Type of Exercise : walking 4 K steps per day around her house daily   Is limited by: back pain and joint pain  Tobacco history: Denies   Alcohol use: Denies     Secondary Prevention:  Statin? Yes  ACE-I/ARB? Yes (Entresto)  Aspirin? Yes    Pertinent PMH Review:  PMH of Pancreatitis: No  PMH of Retinopathy: No  PMH of Urinary Tract Infections: No  PMH of MTC: No  PMH of MEN2: No  UACR/EGFR in last year?: Yes  POC ALBUMIN /CREATININE RATIO MANUALLY ENTERED   Date Value Ref Range Status   05/14/2024 <30 <30 ug/mg Creat Final   10/10/2023 30 - 300 (A) <30 ug/mg Creat Final     Albumin/Creatine Ratio   Date Value Ref Range Status   01/27/2022 SEE COMMENT 0.0 - 30.0 ug/mg crt Final     Comment:     One or more analytes used in this calculation   is outside of the analytical measurement range.  Calculation cannot be performed.       Drug Interactions  No relevant drug interactions were noted.    Medication System Management  Patient's preferred pharmacy: [facility]   Adherence/Organization: No barriers   Affordability/Accessibility:  Patient Assistance for Mounjaro and Jardiance approved through 2/24/2026. Will have to be renewed prior to that date to prevent lapse in coverage. Medication(s) will be received at no cost to patient from [facility].     Objective   Allergies   Allergen Reactions    Shellfish Containing Products Anaphylaxis    Codeine Nausea And Vomiting and Other     GI Intolerance    Tramadol Other and Itching     tongue blistered    Celecoxib Rash     Social History     Social History Narrative    Not on file      Medication Review  Current Outpatient Medications   Medication Instructions    albuterol 90 mcg/actuation inhaler 1 puff, Every 4  hours PRN    aspirin 81 mg capsule 1 tablet, Daily    atorvastatin (LIPITOR) 40 mg, oral, Daily    biotin 10 mg tablet 1 tablet, Daily    blood sugar diagnostic (Truetrack Test) strip TEST twice a day    calcium carbonate-vitamin D3 600 mg-20 mcg (800 unit) tablet Caltrate with Vitamin D3 600 mg (1,500 mg)-800 unit tablet    coenzyme Q-10 200 mg capsule 1 capsule, Daily    cyanocobalamin (VITAMIN B-12) 250 mcg, Daily    escitalopram (LEXAPRO) 20 mg, oral, Daily    ferrous sulfate 325 (65 Fe) MG tablet 65 mg of elemental iron, 2 times daily    fexofenadine (Allegra) 180 mg tablet 1 tablet, Daily    fluticasone (Flonase) 50 mcg/actuation nasal spray 1 spray, Daily    folic acid (FOLVITE) 1 mg, Daily    furosemide (LASIX) 20 mg, oral, Daily    gabapentin (NEURONTIN) 100 mg, 2 times daily    gabapentin (NEURONTIN) 300 mg, oral, 2 times daily    hydroxychloroquine (Plaquenil) 200 mg tablet oral, 2 times daily    Jardiance 10 mg, oral, Daily    levothyroxine (SYNTHROID, LEVOXYL) 100 mcg, oral, Daily    magnesium oxide (Mag-Ox) 400 mg tablet 2 tablets, Daily    meloxicam (MOBIC) 7.5 mg, oral, Daily    metFORMIN (GLUCOPHAGE) 1,000 mg, oral, 2 times daily (morning and late afternoon)    methocarbamol (Robaxin) 500 mg tablet Take 1-2 tabs every 8 hours as needed for spasms    metoprolol succinate XL (TOPROL-XL) 100 mg, oral, Daily    Mounjaro 7.5 mg, subcutaneous, Weekly    multivit/folic acid/vit K1 (WOMEN'S 50 PLUS ADVANCED ORAL) 1 tablet, Daily    pantoprazole (PROTONIX) 40 mg, oral, Daily    psyllium (Metamucil) 3.4 gram packet 1 packet, Daily    sacubitriL-valsartan (Entresto) 24-26 mg tablet 1 tablet, oral, 2 times daily      Vitals  BP Readings from Last 2 Encounters:   03/18/25 136/78   02/05/25 120/78     BMI Readings from Last 1 Encounters:   03/18/25 38.39 kg/m²      Labs  Lab Results   Component Value Date    HGBA1C 10.5 (A) 03/18/2025    HGBA1C 8.2 (A) 12/03/2024    HGBA1C 8.6 (H) 08/23/2024     Lab Results    Component Value Date    CALCIUM 9.5 08/23/2024     08/23/2024    K 4.3 08/23/2024    CO2 26 08/23/2024     08/23/2024    BUN 30 (H) 08/23/2024    CREATININE 0.93 08/23/2024    EGFR 65 08/23/2024     Lab Results   Component Value Date    ALT 14 08/23/2024    AST 15 08/23/2024    ALKPHOS 49 08/23/2024    BILITOT 0.5 08/23/2024     Lab Results   Component Value Date    TRIG 479 (H) 08/23/2024    CHOL 163 08/23/2024    LDLF 46 01/26/2022    LDLCALC  08/23/2024      Comment:      The calculation of LDL and VLDL are inaccurate when the Triglycerides are greater than 400 mg/dL or when the patient is non-fasting. If LDL measurement is necessary contact the testing laboratory for an alternative LDL assay.                                  Near   Borderline      AGE      Desirable  Optimal    High     High     Very High     0-19 Y     0 - 109     ---    110-129   >/= 130     ----    20-24 Y     0 - 119     ---    120-159   >/= 160     ----      >24 Y     0 -  99   100-129  130-159   160-189     >/=190      HDL 35.8 08/23/2024     Lab Results   Component Value Date    MICROALBCREA <30 05/14/2024     Wt Readings from Last 3 Encounters:   03/18/25 98.3 kg (216 lb 11.2 oz)   02/05/25 97.5 kg (215 lb)   01/17/25 101 kg (222 lb)      There is no height or weight on file to calculate BMI.     Assessment/Plan   Problem List Items Addressed This Visit       Controlled type 2 diabetes mellitus with complication, without long-term current use of insulin (Multi)    Relevant Medications    tirzepatide (Mounjaro) 7.5 mg/0.5 mL pen injector    Other Relevant Orders    Referral to Clinical Pharmacy       Patient's goal A1c is < 7.5%.  Is pt at goal? No, 10.5%  Patient's SMBGs are steadily improving.     Medication Changes:  CONTINUE  Metformin 1000mg twice daily, Jardiance 10mg daily   Mounjaro 7.5mg once weekly   Additional month on current dose due to mild GI side effects. Eat smaller meals more frequently. Avoid oily foods.  Aim for 64 ounces of water daily.     Future Considerations:  Patients goal is to reduce pill burden. Pending adequate glycemic control and tolerance with GLP1a dose escalations will aim to stop metformin as able.    Monitoring and Education:  Counseled patient on Mounjaro MOA, expectations, side effects, duration of therapy, administration, and monitoring parameters.  Provided detailed dosing and administration counseling to ensure proper technique.   Reviewed Mounjaro titration schedule, starting with 2.5 mg once weekly to a goal of 15 mg once weekly if tolerated  Counseled patient on the benefits of GLP-1ra glycemic control and weight loss  Reviewed storage requirements of Mounjaro when not in use, and when to administer the medication if a dose is missed.  Advised patient that they may experience improved satiety after meals and portion sizes of meals may be reduced as doses of Mounjaro increase.     Clinical Pharmacist follow-up:  7/7 0920, Telehealth visit    Continue all meds under the continuation of care with the referring provider and clinical pharmacy team.    Thank you,  Alyssa Munoz, PharmD  Clinical Pharmacy Specialist Primary Care  [phone]     Verbal consent to manage patient's drug therapy was obtained from the patient. They were informed they may decline to participate or withdraw from participation in pharmacy services at any time.

## 2025-06-09 ENCOUNTER — PHARMACY VISIT (OUTPATIENT)
Dept: PHARMACY | Facility: CLINIC | Age: 75
End: 2025-06-09
Payer: COMMERCIAL

## 2025-06-16 NOTE — PROGRESS NOTES
Pharmacist Clinic: Cardiology Management    Joana Elizabeth is a 75 y.o. female was referred to Clinical Pharmacy Team for heart failure management.     Referring Provider: Madie Augustine APRN*    THIS IS A FOLLOW UP PATIENT APPOINTMENT. AT LAST VISIT ON 3/17/2025 WITH PHARMACIST (Suzi Alfaro).    Appointment was completed by the patient who was reached at .    REVIEW OF PAST APPNT (IF APPLICABLE):   Today's appointment was 1 month follow up regarding heart failure management. Joana reports doing well on Entresto and Jardiance with no side effects. She mentions checking blood pressure every day in which it has been at goal.   She plans to start GLP-1 soon so will hold off on starting MRA to complete GDMT.      Allergies[1]    Medical History[2]    Medications Ordered Prior to Encounter[3]      RELEVANT LAB RESULTS:  Lab Results   Component Value Date    BILITOT 0.5 08/23/2024    CALCIUM 9.5 08/23/2024    CO2 26 08/23/2024     08/23/2024    CREATININE 0.93 08/23/2024    GLUCOSE 186 (H) 08/23/2024    ALKPHOS 49 08/23/2024    K 4.3 08/23/2024    PROT 6.5 08/23/2024     08/23/2024    AST 15 08/23/2024    ALT 14 08/23/2024    BUN 30 (H) 08/23/2024    ANIONGAP 16 08/23/2024    MG 1.93 06/26/2019    PHOS 4.1 06/26/2019    ALBUMIN 4.1 08/23/2024    LIPASE 26 06/11/2018    GFRF 69 01/18/2023     Lab Results   Component Value Date    TRIG 479 (H) 08/23/2024    CHOL 163 08/23/2024    LDLCALC  08/23/2024      Comment:      The calculation of LDL and VLDL are inaccurate when the Triglycerides are greater than 400 mg/dL or when the patient is non-fasting. If LDL measurement is necessary contact the testing laboratory for an alternative LDL assay.                                  Near   Borderline      AGE      Desirable  Optimal    High     High     Very High     0-19 Y     0 - 109     ---    110-129   >/= 130     ----    20-24 Y     0 - 119     ---    120-159   >/= 160     ----      >24 Y     0 -   "99   100-129  130-159   160-189     >/=190      HDL 35.8 08/23/2024     No results found for: \"BMCBC\", \"CBCDIF\"     PHARMACEUTICAL ASSESSMENT:    MEDICATION RECONCILIATION    Was a medication reconciliation completed at this visit? No      Drug Interactions? No clinically significant drug interactions requiring change in therapy found at the time of this visit.     Medication Documentation Review Audit       Reviewed by Sherie Munoz PharmD (Pharmacist) on 03/21/25 at 0942      Medication Order Taking? Sig Documenting Provider Last Dose Status    Patient not taking:   Discontinued 03/18/25 1121   albuterol 90 mcg/actuation inhaler [id number] No Inhale 1 puff every 4 hours if needed. Historical Provider, MD Taking Active   aspirin 81 mg capsule [id number] No Take 1 tablet by mouth once daily. CHEW AND SWALLOW Historical Provider, MD Taking Active   atorvastatin (Lipitor) 40 mg tablet [id number] No TAKE 1 TABLET BY MOUTH ONCE  DAILY January Garcia,  Taking Active   biotin 10 mg tablet [id number] No Take 1 tablet (10 mg) by mouth once daily. Historical Provider, MD Taking Active   blood sugar diagnostic (Truetrack Test) strip [id number] No TEST twice a day Historical Provider, MD Taking Active   calcium carbonate-vitamin D3 600 mg-20 mcg (800 unit) tablet [id number] No Caltrate with Vitamin D3 600 mg (1,500 mg)-800 unit tablet Historical Provider, MD Taking Active   coenzyme Q-10 200 mg capsule [id number] No Take 1 capsule (200 mg) by mouth once daily. Historical Provider, MD Taking Active   cyanocobalamin (Vitamin B-12) 250 mcg tablet [id number] No Take 1 tablet (250 mcg) by mouth once daily. Historical Provider, MD Taking Active   empagliflozin (Jardiance) 10 mg [id number]  Take 1 tablet (10 mg) by mouth once daily. Madie Augustine, APRN-CNP  Active   escitalopram (Lexapro) 20 mg tablet [id number] No TAKE 1 TABLET BY MOUTH ONCE  DAILY January Garcia,  Taking Active   ferrous sulfate 325 (65 Fe) MG tablet " [id number] No Take 1 tablet (325 mg) by mouth 2 times a day. Historical Provider, MD Taking Active   fexofenadine (Allegra) 180 mg tablet [id number] No Take 1 tablet (180 mg) by mouth once daily. Historical Provider, MD Taking Active   fluticasone (Flonase) 50 mcg/actuation nasal spray [id number] No Administer 1 spray into affected nostril(s) once daily. Historical Provider, MD Taking Active   folic acid (Folvite) 1 mg tablet [id number] No Take 1 tablet (1 mg) by mouth once daily. @0900 Historical Provider, MD Taking Active   furosemide (Lasix) 20 mg tablet [id number]  TAKE 1 TABLET BY MOUTH ONCE  DAILY January Garcia DO  Active   gabapentin (Neurontin) 100 mg capsule [id number]  Take 1 capsule (100 mg) by mouth 2 times a day. Historical Provider, MD  Active   gabapentin (Neurontin) 300 mg capsule [id number] No TAKE 1 CAPSULE BY MOUTH TWICE  DAILY   Patient taking differently: Take 200 mg by mouth 2 times a day.    January Garcia DO Taking Active   hydroxychloroquine (Plaquenil) 200 mg tablet [id number]  TAKE 1 TABLET BY MOUTH TWICE  DAILY Pao Perez MD  Active   levothyroxine (Synthroid, Levoxyl) 100 mcg tablet [id number]  TAKE 1 TABLET BY MOUTH ONCE  DAILY January Garcia DO  Active   magnesium oxide (Mag-Ox) 400 mg tablet [id number] No Take 2 tablets (800 mg) by mouth once daily. Stephanie Restrepo MD Taking Active   meloxicam (Mobic) 7.5 mg tablet [id number]  TAKE 1 TABLET BY MOUTH ONCE  DAILY Pao Perez MD  Active   metFORMIN (Glucophage) 1,000 mg tablet [id number] No TAKE 1 TABLET BY MOUTH TWICE  DAILY WITH MEALS January Garcia DO Taking Active   methocarbamol (Robaxin) 500 mg tablet [id number] No Take 1-2 tabs every 8 hours as needed for spasms Vonda Sanchez PA-C Taking Active   metoprolol succinate XL (Toprol-XL) 100 mg 24 hr tablet [id number]  TAKE 1 TABLET BY MOUTH ONCE  DAILY Anthony Maravilla MD  Active   multivit/folic acid/vit K1 (WOMEN'S 50 PLUS ADVANCED ORAL) [id number] No  "Take 1 tablet by mouth once daily. Historical Provider, MD Taking Active   pantoprazole (ProtoNix) 40 mg EC tablet [id number]  TAKE 1 TABLET BY MOUTH ONCE  DAILY January Garcia DO  Active   sacubitriL-valsartan (Entresto) 24-26 mg tablet [id number]  Take 1 tablet by mouth 2 times a day. Katelynn Augustine, APRN-CNP  Active   tirzepatide (Mounjaro) 5 mg/0.5 mL pen injector [id number]  Inject 5 mg under the skin every 7 days. January Garcia DO  Active                    DISEASE MANAGEMENT ASSESSMENT:     CHF ASSESSMENT     Symptom/Staging:  -Most recent ejection fraction: 55-60%    Results for orders placed in visit on 01/13/23    Echocardiogram    Narrative  [facility], [address] [place] [address]  Tel [phone] and Fax [phone]    TRANSTHORACIC ECHOCARDIOGRAM REPORT      Patient Name:     EULOGIO Spear Physician:   [id number] Anthony Maravilla MD  Study Date:       1/13/2023      Referring Physician: KATELYNN AUGUSTINE  MRN/PID:          [id number]       PCP:  Accession/Order#: [id number]   Department Location: [facility]  YOB: 1950      Fellow:  Gender:           F              Nurse:  Admit Date:                      Sonographer:         Mee Harper \"[facility]\"  Admission Status: Outpatient     Additional Staff:  Height:           160.02 cm      CC Report to:  Weight:           102.06 kg      Study Type:          Echocardiogram  BSA:              2.03 m2  Blood Pressure: 120 /66 mmHg    Diagnosis/ICD: I42.0-Dilated cardiomyopathy  Indication:    Cardiomyopathy  Procedure/CPT: Echo Complete w Full [id number]    Patient History:  Pacer/Defib:       AICD  Pertinent History: Cardiomyopathy and HTN. LV dysfunction.    Study Detail: The following Echo studies were performed: 2D, M-Mode, Doppler and  color flow.      PHYSICIAN INTERPRETATION:  Left Ventricle: The left ventricular systolic function is normal, with an estimated ejection fraction of 55-60%. The " calculated ejection fraction is normal at 61 % using the Roy's Bi-plane MOD calculation. There are no regional wall motion abnormalities. The left ventricular cavity size is normal. There is borderline concentric left ventricular hypertrophy. Spectral Doppler shows an impaired relaxation pattern of left ventricular diastolic filling.  Left Atrium: The left atrium is upper limits of normal in size.  Right Ventricle: The right ventricle is normal in size. There is normal right ventriclar wall thickness. There is normal right ventricular global systolic function. A device is visualized in the right ventricle.  Right Atrium: The right atrium is normal in size. There is a device visualized in the right atrium.  Aortic Valve: The aortic valve appears structurally normal. The aortic valve appears tricuspid and non-restricted. There is no evidence of aortic valve regurgitation. The peak instantaneous gradient of the aortic valve is 16.8 mmHg. The mean gradient of the aortic valve is 9.0 mmHg.  Mitral Valve: The mitral valve is normal in structure. There is normal mitral valve leaflet mobility. There is mild mitral valve regurgitation.  Tricuspid Valve: The tricuspid valve is structurally normal. There is normal tricuspid valve leaflet mobility. There is trace tricuspid regurgitation.  Pulmonic Valve: The pulmonic valve is structurally normal. There is trace pulmonic valve regurgitation.  Pericardium: There is no pericardial effusion noted.  Aorta: The aortic root is normal. The Ao Sinus is 3.30 cm. There is no dilatation of the aortic arch. There is no dilatation of the ascending aorta. There is no dilatation of the aortic root.  Pulmonary Artery: The estimated PASP is 25 mmHg.  In comparison to the previous echocardiogram(s): Compared with study from 3/28/2022,. The estimated LV ejection fraction has improved from 25-30% to 55-60% since the study of 3/28/2022.      CONCLUSIONS:  1. Left ventricular systolic function is  normal with a 55-60% estimated ejection fraction.  2. Spectral Doppler shows an impaired relaxation pattern of left ventricular diastolic filling.  3. Mild mitral valve regurgitation.  4. The estimated LV ejection fraction has improved from 25-30% to 55-60% since the study of 3/28/2022.    QUANTITATIVE DATA SUMMARY:  2D MEASUREMENTS:  Normal Ranges:  Ao Root d:     3.30 cm    (2.0-3.7cm)  LAs:           3.80 cm    (2.7-4.0cm)  IVSd:          1.30 cm    (0.6-1.1cm)  LVPWd:         1.00 cm    (0.6-1.1cm)  LVIDd:         5.20 cm    (3.9-5.9cm)  LVIDs:         3.30 cm  LV Mass Index: 115.4 g/m2  LV % FS        36.5 %    LA VOLUME:  Normal Ranges:  LA Area A4C:     15.3 cm2  LA Area A2C:     17.6 cm2  LA Volume Index: 24.0 ml/m2  LA Vol A4C:      38.0 ml  LA Vol A2C:      49.5 ml  LA Vol BP:       50.0 ml    AORTA MEASUREMENTS:  Normal Ranges:  Ao Sinus, d: 3.30 cm (2.1-3.5cm)    LV SYSTOLIC FUNCTION BY 2D PLANIMETRY (MOD):  Normal Ranges:  EF-A4C View: 65.0 % (>=55%)  EF-A2C View: 59.0 %  EF-Biplane:  60.8 %    LV DIASTOLIC FUNCTION:  Normal Ranges:  MV Peak E:        0.62 m/s    (0.7-1.2 m/s)  MV Peak A:        0.89 m/s    (0.42-0.7 m/s)  E/A Ratio:        0.69        (1.0-2.2)  MV lateral e'     0.10 m/s  MV medial e'      0.10 m/s  MV A Dur:         145.00 msec  E/e' Ratio:       6.00        (<8.0)  PulmV Sys Mata:    76.00 cm/s  PulmV Beavers Mata:   43.40 cm/s  PulmV S/D Mata:    1.80  PulmV A Revs Mata: 51.30 cm/s  PulmV A Revs Dur: 137.00 msec    MITRAL VALVE:  Normal Ranges:  MV DT: 156 msec (150-240msec)    AORTIC VALVE:  Normal Ranges:  AoV Vmax:                2.05 m/s  (<=1.7m/s)  AoV Peak P.8 mmHg (<20mmHg)  AoV Mean P.0 mmHg  (1.7-11.5mmHg)  LVOT Max Mata:            1.38 m/s  (<=1.1m/s)  AoV VTI:                 49.30 cm  (18-25cm)  LVOT VTI:                32.30 cm  LVOT Diameter:           2.10 cm   (1.8-2.4cm)  AoV Area, VTI:           2.27 cm2  (2.5-5.5cm2)  AoV Area,Vmax:            2.33 cm2  (2.5-4.5cm2)  AoV Dimensionless Index: 0.66    RIGHT VENTRICLE:  RV 1   3.2 cm  RV 2   3.3 cm  RV 3   6.2 cm  TAPSE: 31.0 mm    TRICUSPID VALVE/RVSP:  Normal Ranges:  Peak TR Velocity: 2.36 m/s  RV Syst Pressure: 25.3 mmHg (< 30mmHg)    PULMONIC VALVE:  Normal Ranges:  PV Max Mata: 1.0 m/s  (0.6-0.9m/s)  PV Max PG:  3.6 mmHg    Pulmonary Veins:  PulmV A Revs Dur: 137.00 msec  PulmV A Revs Mata: 51.30 cm/s  PulmV Beavers Mata:   43.40 cm/s  PulmV S/D Mata:    1.80  PulmV Sys Mata:    76.00 cm/s      [id number] Anthony Maravilla MD  Electronically signed on 1/15/2023 at 2:58:06 PM         Final       Guideline-Directed Medical Therapy:  -ARNI: Yes, describe: Entresto 24-26 mg BID  -Beta Blocker: Yes, describe: Metoprolol succinate 100 mg every day   -MRA: No  -SGLT2i: Yes, describe: Jardiance 10 mg every day     Secondary Prevention:  -The 10-year ASCVD risk score (Brandon DK, et al., 2019) is: 39.6%    Values used to calculate the score:      Age: 75 years      Sex: Female      Is Non- : No      Diabetic: Yes      Tobacco smoker: No      Systolic Blood Pressure: 136 mmHg      Is BP treated: Yes      HDL Cholesterol: 35.8 mg/dL      Total Cholesterol: 163 mg/dL   -Aspirin 81mg? yes  -Statin?: Yes, describe: Atorvastatin 40 mg every day   -HTN?: Yes, describe: 136/78 as of 3/18/2025    CURRENT PHARMACOTHERAPY:   Jardiance 10 mg every day   Furosemide 20 mg every day   Metoprolol succinate 100 mg every day   Entresto 24-26 mg BID    Affordability:  PAP - active through 2/24/2026  Adherence/Compliance: no concerns   Adverse Effects: constipation with Mounjaro - is using Metamucil which is helping resolve this    Monitoring Weights at Home: Yes, daily  Home Weight Recordings: 208 lbs (denies acute fluctuations)    Past In Office Weight Readings:   Wt Readings from Last 6 Encounters:   03/18/25 98.3 kg (216 lb 11.2 oz)   02/05/25 97.5 kg (215 lb)   01/17/25 101 kg (222 lb)   01/15/25 101 kg (222 lb  3.2 oz)   01/08/25 101 kg (222 lb)   01/02/25 101 kg (222 lb)       Monitoring Blood Pressure at Home: Yes  Home BP Recordings: 104/59 HR 87 (consistent per patient); some dizziness when bending over and standing back up but none at other times. Resolves quickly.     Past In Office BP Readings:   BP Readings from Last 6 Encounters:   03/18/25 136/78   02/05/25 120/78   01/15/25 118/70   01/02/25 113/75   12/03/24 112/60   08/27/24 140/82       HEALTH MANAGEMENT    Maintaining fluid restriction (<2 L/day): N/A  Edema/swelling: No  Shortness of breath: No  Trouble sleeping/lying down: No, reports compliance to her CPAP  Dry/hacking cough: No  Recent Hospitalizations: No, had eye surgery in April but was discharged the same day    EDUCATION/COUNSELING:   - Counseled patient on MOA, expectations, duration of therapy, contraindications, administration, and monitoring parameters  - Counseled patient on lifestyle modifications that can decrease your risk of having complications (smoking cessation, losing weight, daily weights, vaccines)  - Counseled patient on fluid intake and weight management. Recommended to not consume more than 2 liters of fliuids per day. If they have gained more than 2-3 pounds within a 24 hours period (or 5 pounds in a week), contact their cardiologist  - Answered all patient questions and concerns       DISCUSSION/NOTES:   Today's appointment was 3 month follow up regarding heart failure pharmacotherapy.   Joana reports no symptoms of worsening heart failure at today's appointment.   She has been having some constipation since starting Mounjaro but has started using Metamucil which has been helpful.  No medication changes recommended today, will follow up in 3 months.    ASSESSMENT:    Assessment/Plan   Problem List Items Addressed This Visit       Chronic congestive heart failure - Primary    Joana is doing well on current HF pharmacotherapy. She reports no symptoms of worsening heart failure. No  changes recommended today, will continue current regimen.         Relevant Orders    Referral to Clinical Pharmacy     Other Visit Diagnoses         Congestive heart failure, unspecified HF chronicity, unspecified heart failure type              RECOMMENDATIONS/PLAN:    Continue:  Jardiance 10 mg every day   Furosemide 20 mg every day   Metoprolol succinate 100 mg every day   Entresto 24-26 mg BID  Follow up: 3 months    Last Appnt with Referring Provider: 1/2/2025  Next Appnt with Referring Provider: 7/3/2025  Clinical Pharmacist follow up: 9/18/2025  VAF/Application Expiration: Yes    Date: 2/24/2026  Type of Encounter: Adriana Hartley PharmD    Verbal consent to manage patient's drug therapy was obtained from the patient . They were informed they may decline to participate or withdraw from participation in pharmacy services at any time.    Continue all meds under the continuation of care with the referring provider and clinical pharmacy team.            [1]   Allergies  Allergen Reactions    Shellfish Containing Products Anaphylaxis    Codeine Nausea And Vomiting and Other     GI Intolerance    Tramadol Other and Itching     tongue blistered    Celecoxib Rash   [2]   Past Medical History:  Diagnosis Date    Abnormal levels of other serum enzymes 12/01/2020    Abnormal AST and ALT    Anemia, unspecified 12/28/2018    Postoperative anemia    Calculus of gallbladder without cholecystitis without obstruction 06/26/2019    Gallstones    Cardiomyopathy     s/p ICD, last seen by Dr. Maravilla on 05/14/2023    Cataract     Chronic maxillary sinusitis 03/22/2018    Right maxillary sinusitis    COVID-19 10/02/2020    COVID-19 virus infection    Depression     Diabetes mellitus (Multi)     Dorsalgia, unspecified 10/15/2019    Mid-back pain, acute    Effusion, unspecified hand 10/14/2016    Swelling of hand joint    Essential (primary) hypertension 08/19/2021    Benign essential HTN    Gastroparesis 12/05/2019     Gastroparesis    History of falling 08/04/2021    Status post fall    Hyperlipidemia     Hypothyroidism     Localized edema 05/01/2018    Leg edema    Occipital neuralgia 02/10/2017    Occipital neuralgia of right side    Osteomyelitis, unspecified 12/16/2019    Knee osteomyelits, right    Other amnesia 09/01/2020    Memory problem    Other intervertebral disc displacement, lumbar region 04/23/2020    Disc displacement, lumbar    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    Postherpetic neuralgia    Other specified disorders of kidney and ureter 05/30/2019    Left renal mass    Personal history of other diseases of the circulatory system 08/03/2021    History of carotid artery stenosis    Personal history of other diseases of the digestive system 06/26/2019    History of biliary colic    Personal history of other infectious and parasitic diseases     History of scarlet fever    Personal history of other infectious and parasitic diseases 08/21/2017    History of herpes zoster    Personal history of other specified conditions 11/25/2015    History of right flank pain    Pneumonia, unspecified organism 03/29/2019    Left lower lobe pneumonia    Radiculopathy, cervical region 02/17/2020    Cervical radiculopathy    Radiculopathy, lumbar region 02/17/2020    Lumbar radiculitis    Repeated falls 04/17/2018    Recurrent falls    Sleep apnea     Spinal stenosis     cervical, scheduled for surgery 11/14/2023    Spondylosis without myelopathy or radiculopathy, cervical region 08/01/2017    Spondylosis of cervical region without myelopathy or radiculopathy    Spondylosis without myelopathy or radiculopathy, lumbar region 02/17/2020    Lumbar spondylosis    Stricture of artery 08/03/2021    Subclavian artery stenosis, right    Trigger finger, right index finger 07/30/2021    Trigger index finger of right hand    Trigger finger, right ring finger 07/30/2021    Trigger ring finger of right hand    Unspecified  asthma, uncomplicated ([facility]) 03/19/2020    Asthmatic bronchitis    Vision loss    [3]   Current Outpatient Medications on File Prior to Visit   Medication Sig Dispense Refill    albuterol 90 mcg/actuation inhaler Inhale 1 puff every 4 hours if needed.      aspirin 81 mg capsule Take 1 tablet by mouth once daily. CHEW AND SWALLOW      atorvastatin (Lipitor) 40 mg tablet TAKE 1 TABLET BY MOUTH ONCE  DAILY 90 tablet 3    biotin 10 mg tablet Take 1 tablet (10 mg) by mouth once daily.      blood sugar diagnostic (Truetrack Test) strip TEST twice a day      calcium carbonate-vitamin D3 600 mg-20 mcg (800 unit) tablet Caltrate with Vitamin D3 600 mg (1,500 mg)-800 unit tablet      coenzyme Q-10 200 mg capsule Take 1 capsule (200 mg) by mouth once daily.      cyanocobalamin (Vitamin B-12) 250 mcg tablet Take 1 tablet (250 mcg) by mouth once daily.      empagliflozin (Jardiance) 10 mg tablet Take 1 tablet (10 mg) by mouth once daily. 90 tablet 3    escitalopram (Lexapro) 20 mg tablet TAKE 1 TABLET BY MOUTH ONCE  DAILY 90 tablet 3    ferrous sulfate 325 (65 Fe) MG tablet Take 1 tablet (325 mg) by mouth 2 times a day.      fexofenadine (Allegra) 180 mg tablet Take 1 tablet (180 mg) by mouth once daily.      fluticasone (Flonase) 50 mcg/actuation nasal spray Administer 1 spray into affected nostril(s) once daily.      folic acid (Folvite) 1 mg tablet Take 1 tablet (1 mg) by mouth once daily. @0900      furosemide (Lasix) 20 mg tablet TAKE 1 TABLET BY MOUTH ONCE  DAILY 90 tablet 3    gabapentin (Neurontin) 100 mg capsule Take 1 capsule (100 mg) by mouth 2 times a day.      gabapentin (Neurontin) 300 mg capsule TAKE 1 CAPSULE BY MOUTH TWICE  DAILY 180 capsule 3    hydroxychloroquine (Plaquenil) 200 mg tablet TAKE 1 TABLET BY MOUTH TWICE  DAILY 180 tablet 2    levothyroxine (Synthroid, Levoxyl) 100 mcg tablet TAKE 1 TABLET BY MOUTH ONCE  DAILY 90 tablet 3    magnesium oxide (Mag-Ox) 400 mg tablet Take 2 tablets (800 mg) by  mouth once daily.      meloxicam (Mobic) 7.5 mg tablet TAKE 1 TABLET BY MOUTH ONCE  DAILY 90 tablet 3    metFORMIN (Glucophage) 1,000 mg tablet TAKE 1 TABLET BY MOUTH TWICE  DAILY WITH MEALS 180 tablet 3    methocarbamol (Robaxin) 500 mg tablet Take 1-2 tabs every 8 hours as needed for spasms 60 tablet 2    metoprolol succinate XL (Toprol-XL) 100 mg 24 hr tablet TAKE 1 TABLET BY MOUTH ONCE  DAILY 90 tablet 3    multivit/folic acid/vit K1 (WOMEN'S 50 PLUS ADVANCED ORAL) Take 1 tablet by mouth once daily.      pantoprazole (ProtoNix) 40 mg EC tablet TAKE 1 TABLET BY MOUTH ONCE  DAILY 90 tablet 3    psyllium (Metamucil) 3.4 gram packet Take 1 packet by mouth once daily.      sacubitriL-valsartan (Entresto) 24-26 mg tablet Take 1 tablet by mouth 2 times a day. 180 tablet 3    tirzepatide (Mounjaro) 7.5 mg/0.5 mL pen injector Inject 7.5 mg under the skin 1 (one) time per week. 2 mL 0     No current facility-administered medications on file prior to visit.

## 2025-06-17 ENCOUNTER — APPOINTMENT (OUTPATIENT)
Dept: PHARMACY | Facility: HOSPITAL | Age: 75
End: 2025-06-17
Payer: MEDICARE

## 2025-06-17 DIAGNOSIS — I50.9 CONGESTIVE HEART FAILURE, UNSPECIFIED HF CHRONICITY, UNSPECIFIED HEART FAILURE TYPE: ICD-10-CM

## 2025-06-17 DIAGNOSIS — I50.9 CHRONIC CONGESTIVE HEART FAILURE, UNSPECIFIED HEART FAILURE TYPE: Primary | ICD-10-CM

## 2025-06-17 NOTE — ASSESSMENT & PLAN NOTE
Joana is doing well on current HF pharmacotherapy. She reports no symptoms of worsening heart failure. No changes recommended today, will continue current regimen.

## 2025-06-17 NOTE — Clinical Note
Joana Syed reports no symptoms of worsening heart failure at today's appointment. She has been having some constipation since starting Mounjaro through primary care, but has started using Metamucil which has been helpful. No medication changes today, will follow up in 3 months.

## 2025-06-20 ENCOUNTER — APPOINTMENT (OUTPATIENT)
Dept: PRIMARY CARE | Facility: CLINIC | Age: 75
End: 2025-06-20
Payer: MEDICARE

## 2025-06-30 ENCOUNTER — APPOINTMENT (OUTPATIENT)
Dept: PRIMARY CARE | Facility: CLINIC | Age: 75
End: 2025-06-30
Payer: MEDICARE

## 2025-06-30 VITALS
BODY MASS INDEX: 36.67 KG/M2 | WEIGHT: 207 LBS | TEMPERATURE: 97.2 F | DIASTOLIC BLOOD PRESSURE: 68 MMHG | SYSTOLIC BLOOD PRESSURE: 132 MMHG | OXYGEN SATURATION: 98 % | HEART RATE: 89 BPM

## 2025-06-30 DIAGNOSIS — I42.9 CARDIOMYOPATHY, UNSPECIFIED TYPE (MULTI): ICD-10-CM

## 2025-06-30 DIAGNOSIS — I15.9 SECONDARY HYPERTENSION: ICD-10-CM

## 2025-06-30 DIAGNOSIS — E11.9 TYPE 2 DIABETES MELLITUS WITHOUT COMPLICATION, WITHOUT LONG-TERM CURRENT USE OF INSULIN: Primary | ICD-10-CM

## 2025-06-30 LAB — POC HEMOGLOBIN A1C: 7.6 % (ref 4.2–6.5)

## 2025-06-30 PROCEDURE — 99214 OFFICE O/P EST MOD 30 MIN: CPT | Performed by: FAMILY MEDICINE

## 2025-06-30 PROCEDURE — 3075F SYST BP GE 130 - 139MM HG: CPT | Performed by: FAMILY MEDICINE

## 2025-06-30 PROCEDURE — 83036 HEMOGLOBIN GLYCOSYLATED A1C: CPT | Performed by: FAMILY MEDICINE

## 2025-06-30 PROCEDURE — 3051F HG A1C>EQUAL 7.0%<8.0%: CPT | Performed by: FAMILY MEDICINE

## 2025-06-30 PROCEDURE — 1159F MED LIST DOCD IN RCRD: CPT | Performed by: FAMILY MEDICINE

## 2025-06-30 PROCEDURE — 3078F DIAST BP <80 MM HG: CPT | Performed by: FAMILY MEDICINE

## 2025-06-30 RX ORDER — PREDNISOLONE ACETATE 10 MG/ML
SUSPENSION/ DROPS OPHTHALMIC
COMMUNITY
Start: 2025-04-24

## 2025-06-30 NOTE — PROGRESS NOTES
"Subjective   Patient ID: Joana Elizabeth is a 75 y.o. female who presents for Diabetes (Last A1C was completed on 3/18/25 resulting in 10.5. Today in office 7.6.).  HPI  BEING MANAGED BY PHARMACY   ON MONJARO   HAS LOST 9 POUNDS   BMI IS 36 AND THIS IS MUCH IMPROVED   Review of Systems   Constitutional:  Positive for unexpected weight change. Negative for activity change, appetite change and fever.        CONTINUED WEIGHT LOSS WITH THE MONJARO AND FEELS WELL AND MORE LIKE BEING ACTIVE    HENT:  Negative for congestion, ear pain, postnasal drip, rhinorrhea, sinus pressure and sore throat.    Eyes:  Negative for discharge, itching and visual disturbance.   Respiratory:  Negative for cough, shortness of breath and wheezing.    Cardiovascular:  Negative for chest pain, palpitations and leg swelling.   Gastrointestinal:  Negative for abdominal pain, blood in stool, constipation, diarrhea, nausea and vomiting.   Endocrine: Negative for cold intolerance, heat intolerance and polyuria.   Genitourinary:  Negative for dysuria, flank pain and hematuria.   Musculoskeletal:  Negative for arthralgias, gait problem, joint swelling and myalgias.   Skin:  Negative for rash.   Allergic/Immunologic: Negative for environmental allergies and food allergies.   Neurological:  Negative for dizziness, syncope, weakness, light-headedness, numbness and headaches.   Psychiatric/Behavioral:  Negative for dysphoric mood and sleep disturbance. The patient is not nervous/anxious.            1/2/2025     1:34 PM 1/8/2025     9:28 AM 1/15/2025    10:38 AM 1/17/2025    10:38 AM 2/5/2025     8:36 AM 3/18/2025    10:36 AM 6/30/2025    11:39 AM   Vitals   Systolic 113  118  120 136 132   Diastolic 75  70  78 78 68   BP Location Left arm    Left arm     Heart Rate 75    96 81 89   Temp     35.6 °C (96 °F) 36.2 °C (97.2 °F) 36.2 °C (97.2 °F)   Height 1.6 m (5' 3\") 1.6 m (5' 3\")  1.6 m (5' 3\")      Weight (lb) 222 222 222.2 222 215 216.7 207   BMI 39.33 " kg/m2 39.33 kg/m2 39.36 kg/m2 39.33 kg/m2 38.09 kg/m2 38.39 kg/m2 36.67 kg/m2   BSA (m2) 2.12 m2 2.12 m2 2.12 m2 2.12 m2 2.08 m2 2.09 m2 2.04 m2   Visit Report Report  Report Report Report Report Report       Body mass index is 36.67 kg/m².      Objective   Physical Exam  Vitals and nursing note reviewed.   Constitutional:       Appearance: Normal appearance.   HENT:      Head: Normocephalic.      Mouth/Throat:      Mouth: Mucous membranes are moist.   Cardiovascular:      Rate and Rhythm: Normal rate and regular rhythm.      Pulses: Normal pulses.      Heart sounds: Normal heart sounds. No murmur heard.     No friction rub. No gallop.   Pulmonary:      Effort: Pulmonary effort is normal. No respiratory distress.      Breath sounds: Normal breath sounds. No wheezing.   Abdominal:      General: There is no distension.      Palpations: Abdomen is soft.      Tenderness: There is no abdominal tenderness.   Musculoskeletal:         General: No deformity. Normal range of motion.   Skin:     General: Skin is warm and dry.      Capillary Refill: Capillary refill takes less than 2 seconds.   Neurological:      General: No focal deficit present.      Mental Status: She is alert and oriented to person, place, and time.   Psychiatric:         Mood and Affect: Mood normal.         Assessment/Plan   Problem List Items Addressed This Visit       Secondary hypertension    Type 2 diabetes mellitus - Primary    Relevant Orders    POCT glycosylated hemoglobin (Hb A1C) manually resulted (Completed)    Cardiomyopathy          January Garcia DO

## 2025-07-01 DIAGNOSIS — E78.5 HYPERLIPIDEMIA, UNSPECIFIED HYPERLIPIDEMIA TYPE: ICD-10-CM

## 2025-07-01 RX ORDER — ATORVASTATIN CALCIUM 40 MG/1
40 TABLET, FILM COATED ORAL DAILY
Qty: 90 TABLET | Refills: 3 | Status: SHIPPED | OUTPATIENT
Start: 2025-07-01

## 2025-07-03 ENCOUNTER — APPOINTMENT (OUTPATIENT)
Dept: CARDIOLOGY | Facility: CLINIC | Age: 75
End: 2025-07-03
Payer: MEDICARE

## 2025-07-07 ENCOUNTER — APPOINTMENT (OUTPATIENT)
Dept: PHARMACY | Facility: HOSPITAL | Age: 75
End: 2025-07-07
Payer: MEDICARE

## 2025-07-07 DIAGNOSIS — E11.8 CONTROLLED TYPE 2 DIABETES MELLITUS WITH COMPLICATION, WITHOUT LONG-TERM CURRENT USE OF INSULIN (MULTI): ICD-10-CM

## 2025-07-07 PROCEDURE — RXMED WILLOW AMBULATORY MEDICATION CHARGE

## 2025-07-07 RX ORDER — TIRZEPATIDE 7.5 MG/.5ML
7.5 INJECTION, SOLUTION SUBCUTANEOUS WEEKLY
Qty: 2 ML | Refills: 0 | Status: SHIPPED | OUTPATIENT
Start: 2025-07-07

## 2025-07-07 NOTE — PROGRESS NOTES
Clinical Pharmacy Appointment    Patient ID: Joana Elizabeth is a 75 y.o. female who presents for Diabetes.      Referring Provider: January Garcia DO  PCP: January Garcia DO   Last visit with PCP: 6/20/2025  Next visit with PCP: 9/15/2025    Subjective       Interval History  Since last visit, Mounjaro maintained at 7.5mg weekly (6 doses completed so far) due to mild GI side effects  Mild GI s/e of nausea, heartburn, gas have resolved   Mild constipation, BM daily, controlled with metamucil. Otherwise no GI side effects.   2 lb weight loss in past month. Reported home weight 205 lbs (214 - 219 lbs at baseline)  Repeat A1c 10.5% --> 7.6% !    PMH CHF, HTN, HLD, WILLIAM, GERD  Home BP 97/53 today, reports baseline 100's over 60's however HR running 90-100s, previously in 50-60 before Mounjaro. No dizziness, chest pain, palpitations     DIABETES MELLITUS TYPE 2:    Diagnosed with diabetes:  ~30 years  Known diabetic complications: none.  Does patient follow with Endocrinology: No  Last optometry exam: twice yearly   Most recent visit in Podiatry: none-- patient denies sores or cuts on feet today      Current diabetic medications include:  Metformin 1000mg twice daily   Jardiance 10mg daily   Mounjaro 7.5mg subcutaneous once weekly (Monday)    Adverse Effects: None    Past diabetic medications include:  Does not recall     Glucose Readings:  Glucometer  Patient tests BG 1-2 times per day    Current home BG readings (mg/dL): AM FBG  157, 137    Previous home BG readings (mg/dL): AM -180    Any episodes of hypoglycemia? No  Did patient treat episode of hypoglycemia appropriately? N/A  Does the patient have a prescription for ready-to-use Glucagon? Not on insulin    Lifestyle:  Diet: 2 large meals/day.   BK: nothing or yogurt and piece of bread   LN: skips   DN: Carb dense; meat veg and starch  Snacks: None  Drinks: Crystal light water (zero sugar), coffee 2-3 cups in AM  Exercise: does not  exercise  Activity type: Type of Exercise : walking 4 K steps per day around her house daily   Is limited by: back pain and joint pain  Tobacco history: Denies   Alcohol use: Denies     Secondary Prevention:  Statin? Yes  ACE-I/ARB? Yes (Entresto)  Aspirin? Yes    Pertinent PMH Review:  PMH of Pancreatitis: No  PMH of Retinopathy: No  PMH of Urinary Tract Infections: No  PMH of MTC: No  PMH of MEN2: No  UACR/EGFR in last year?: Yes  POC ALBUMIN /CREATININE RATIO MANUALLY ENTERED   Date Value Ref Range Status   05/14/2024 <30 <30 ug/mg Creat Final   10/10/2023 30 - 300 (A) <30 ug/mg Creat Final     Albumin/Creatine Ratio   Date Value Ref Range Status   01/27/2022 SEE COMMENT 0.0 - 30.0 ug/mg crt Final     Comment:     One or more analytes used in this calculation   is outside of the analytical measurement range.  Calculation cannot be performed.       Drug Interactions  No relevant drug interactions were noted.    Medication System Management  Patient's preferred pharmacy: [facility]   Adherence/Organization: No barriers   Affordability/Accessibility:  Patient Assistance for Mounjaro and Jardiance approved through 2/24/2026. Will have to be renewed prior to that date to prevent lapse in coverage. Medication(s) will be received at no cost to patient from [facility].     Objective   Allergies   Allergen Reactions    Shellfish Containing Products Anaphylaxis    Codeine Nausea And Vomiting and Other     GI Intolerance    Tramadol Other and Itching     tongue blistered    Celecoxib Rash     Social History     Social History Narrative    Not on file      Medication Review  Current Outpatient Medications   Medication Instructions    albuterol 90 mcg/actuation inhaler 1 puff, Every 4 hours PRN    aspirin 81 mg capsule 1 tablet, Daily    atorvastatin (LIPITOR) 40 mg, oral, Daily    biotin 10 mg tablet 1 tablet, Daily    blood sugar diagnostic (Truetrack Test) strip TEST twice a day    calcium carbonate-vitamin D3 600  mg-20 mcg (800 unit) tablet Caltrate with Vitamin D3 600 mg (1,500 mg)-800 unit tablet    coenzyme Q-10 200 mg capsule 1 capsule, Daily    cyanocobalamin (VITAMIN B-12) 250 mcg, Daily    escitalopram (LEXAPRO) 20 mg, oral, Daily    ferrous sulfate 325 (65 Fe) MG tablet 65 mg of elemental iron, 2 times daily    fexofenadine (Allegra) 180 mg tablet 1 tablet, Daily    fluticasone (Flonase) 50 mcg/actuation nasal spray 1 spray, Daily    folic acid (FOLVITE) 1 mg, Daily    furosemide (LASIX) 20 mg, oral, Daily    gabapentin (NEURONTIN) 100 mg, 2 times daily    gabapentin (NEURONTIN) 300 mg, oral, 2 times daily    hydroxychloroquine (Plaquenil) 200 mg tablet oral, 2 times daily    Jardiance 10 mg, oral, Daily    levothyroxine (SYNTHROID, LEVOXYL) 100 mcg, oral, Daily    magnesium oxide (Mag-Ox) 400 mg tablet 2 tablets, Daily    meloxicam (MOBIC) 7.5 mg, oral, Daily    metFORMIN (GLUCOPHAGE) 1,000 mg, oral, 2 times daily (morning and late afternoon)    methocarbamol (Robaxin) 500 mg tablet Take 1-2 tabs every 8 hours as needed for spasms    metoprolol succinate XL (TOPROL-XL) 100 mg, oral, Daily    Mounjaro 7.5 mg, subcutaneous, Weekly    multivit/folic acid/vit K1 (WOMEN'S 50 PLUS ADVANCED ORAL) 1 tablet, Daily    pantoprazole (PROTONIX) 40 mg, oral, Daily    prednisoLONE acetate (Pred-Forte) 1 % ophthalmic suspension INSTILL 1 DROPS INTO THE RIGHT EYE FOUR TIMES DAILY FOR 1 WEEK, THEN 1 DROP THREE TIMES DAILY FOR 1 WEEK, THEN 1 DROP TWICE DAILY FOR 1 WEEK, THEN 1 DROP ONCE DAILY FOR 1 WEEK, THEN STOP - PATIENT SHOULD START ON APRIL 25, 2025    psyllium (Metamucil) 3.4 gram packet 1 packet, Daily    sacubitriL-valsartan (Entresto) 24-26 mg tablet 1 tablet, oral, 2 times daily      Vitals  BP Readings from Last 2 Encounters:   06/30/25 132/68   03/18/25 136/78     BMI Readings from Last 1 Encounters:   06/30/25 36.67 kg/m²      Labs  Lab Results   Component Value Date    HGBA1C 7.6 (A) 06/30/2025    HGBA1C 10.5 (A)  03/18/2025    HGBA1C 8.2 (A) 12/03/2024     Lab Results   Component Value Date    CALCIUM 9.5 08/23/2024     08/23/2024    K 4.3 08/23/2024    CO2 26 08/23/2024     08/23/2024    BUN 30 (H) 08/23/2024    CREATININE 0.93 08/23/2024    EGFR 65 08/23/2024     Lab Results   Component Value Date    ALT 14 08/23/2024    AST 15 08/23/2024    ALKPHOS 49 08/23/2024    BILITOT 0.5 08/23/2024     Lab Results   Component Value Date    TRIG 479 (H) 08/23/2024    CHOL 163 08/23/2024    LDLF 46 01/26/2022    LDLCALC  08/23/2024      Comment:      The calculation of LDL and VLDL are inaccurate when the Triglycerides are greater than 400 mg/dL or when the patient is non-fasting. If LDL measurement is necessary contact the testing laboratory for an alternative LDL assay.                                  Near   Borderline      AGE      Desirable  Optimal    High     High     Very High     0-19 Y     0 - 109     ---    110-129   >/= 130     ----    20-24 Y     0 - 119     ---    120-159   >/= 160     ----      >24 Y     0 -  99   100-129  130-159   160-189     >/=190      HDL 35.8 08/23/2024     Lab Results   Component Value Date    MICROALBCREA <30 05/14/2024     Wt Readings from Last 3 Encounters:   06/30/25 93.9 kg (207 lb)   03/18/25 98.3 kg (216 lb 11.2 oz)   02/05/25 97.5 kg (215 lb)      There is no height or weight on file to calculate BMI.     Assessment/Plan   Problem List Items Addressed This Visit       Controlled type 2 diabetes mellitus with complication, without long-term current use of insulin (Multi)    Relevant Medications    tirzepatide (Mounjaro) 7.5 mg/0.5 mL pen injector    Other Relevant Orders    Referral to Clinical Pharmacy     Patient's goal A1c is < 7.5%.  Is pt at goal? No, 7.6%  Patient's SMBGs are steadily improving.     Medication Changes:  CONTINUE  Metformin 1000mg twice daily, Jardiance 10mg daily   Mounjaro 7.5mg once weekly   Additional month on current dose due to continued effect on  appetite and glycemic control   BP stable. Heart rate in normal range however ~ 20-30 points higher from baseline since starting Mounjaro in April. Continue to monitor and follow up with cardio if palpitations or dizziness develop.    Future Considerations:  Patients goal is to reduce pill burden. Pending adequate glycemic control and tolerance with GLP1a dose escalations will aim to stop metformin as able.    Monitoring and Education:  Counseled patient on Mounjaro MOA, expectations, side effects, duration of therapy, administration, and monitoring parameters.  Provided detailed dosing and administration counseling to ensure proper technique.   Reviewed Mounjaro titration schedule, starting with 2.5 mg once weekly to a goal of 15 mg once weekly if tolerated  Counseled patient on the benefits of GLP-1ra glycemic control and weight loss  Reviewed storage requirements of Mounjaro when not in use, and when to administer the medication if a dose is missed.  Advised patient that they may experience improved satiety after meals and portion sizes of meals may be reduced as doses of Mounjaro increase.     Clinical Pharmacist follow-up:  8/4 0920, Telehealth visit    Continue all meds under the continuation of care with the referring provider and clinical pharmacy team.    Thank you,  Alyssa Munoz, PharmD  Clinical Pharmacy Specialist Primary Care  [phone]     Verbal consent to manage patient's drug therapy was obtained from the patient. They were informed they may decline to participate or withdraw from participation in pharmacy services at any time.

## 2025-07-08 ASSESSMENT — ENCOUNTER SYMPTOMS
NUMBNESS: 0
WHEEZING: 0
SORE THROAT: 0
COUGH: 0
FLANK PAIN: 0
FEVER: 0
ARTHRALGIAS: 0
DIARRHEA: 0
DYSPHORIC MOOD: 0
ACTIVITY CHANGE: 0
ABDOMINAL PAIN: 0
JOINT SWELLING: 0
DYSURIA: 0
DIZZINESS: 0
RHINORRHEA: 0
EYE DISCHARGE: 0
SLEEP DISTURBANCE: 0
MYALGIAS: 0
NAUSEA: 0
WEAKNESS: 0
HEADACHES: 0
BLOOD IN STOOL: 0
SINUS PRESSURE: 0
EYE ITCHING: 0
APPETITE CHANGE: 0
LIGHT-HEADEDNESS: 0
CONSTIPATION: 0
UNEXPECTED WEIGHT CHANGE: 1
HEMATURIA: 0
PALPITATIONS: 0
VOMITING: 0
NERVOUS/ANXIOUS: 0
SHORTNESS OF BREATH: 0

## 2025-07-09 ENCOUNTER — PHARMACY VISIT (OUTPATIENT)
Dept: PHARMACY | Facility: CLINIC | Age: 75
End: 2025-07-09
Payer: COMMERCIAL

## 2025-08-01 DIAGNOSIS — F32.A DEPRESSION, UNSPECIFIED DEPRESSION TYPE: ICD-10-CM

## 2025-08-01 RX ORDER — ESCITALOPRAM OXALATE 20 MG/1
20 TABLET ORAL DAILY
Qty: 90 TABLET | Refills: 3 | Status: SHIPPED | OUTPATIENT
Start: 2025-08-01

## 2025-08-04 ENCOUNTER — APPOINTMENT (OUTPATIENT)
Dept: PHARMACY | Facility: HOSPITAL | Age: 75
End: 2025-08-04
Payer: MEDICARE

## 2025-08-04 ENCOUNTER — PHARMACY VISIT (OUTPATIENT)
Dept: PHARMACY | Facility: CLINIC | Age: 75
End: 2025-08-04
Payer: COMMERCIAL

## 2025-08-04 DIAGNOSIS — E11.8 CONTROLLED TYPE 2 DIABETES MELLITUS WITH COMPLICATION, WITHOUT LONG-TERM CURRENT USE OF INSULIN (MULTI): Primary | ICD-10-CM

## 2025-08-04 PROCEDURE — RXMED WILLOW AMBULATORY MEDICATION CHARGE

## 2025-08-04 RX ORDER — TIRZEPATIDE 10 MG/.5ML
10 INJECTION, SOLUTION SUBCUTANEOUS WEEKLY
Qty: 2 ML | Refills: 1 | Status: SHIPPED | OUTPATIENT
Start: 2025-08-04

## 2025-08-04 NOTE — PROGRESS NOTES
Clinical Pharmacy Appointment    Patient ID: Joana Elizabeth is a 75 y.o. female who presents for Diabetes.      Referring Provider: January Garcia DO  PCP: January Garcia DO   Last visit with PCP: 6/20/2025  Next visit with PCP: 9/15/2025    Subjective       Interval History  Since last visit, Mounjaro maintained at 7.5mg weekly (on third month at this dose) due to mild GI side effects  Mild GI s/e mostly resolved. Occasional gas attributes to diet  Mild constipation, BM daily, controlled with metamucil. Otherwise no GI side effects.   5 lb weight loss in past month. Reported home weight 200 lbs (214 - 219 lbs at baseline)    PMH CHF, HTN, HLD, WILLIAM, GERD  Reports HR running 80-90s previously 50-60 before Mounjaro. No dizziness, chest pain, palpitations. BP stable.    DIABETES MELLITUS TYPE 2:    Diagnosed with diabetes:  ~30 years  Known diabetic complications: none.  Does patient follow with Endocrinology: No  Last optometry exam: twice yearly   Most recent visit in Podiatry: none-- patient denies sores or cuts on feet today      Current diabetic medications include:  Metformin 1000mg twice daily   Jardiance 10mg daily   Mounjaro 7.5mg subcutaneous once weekly (Monday)    Adverse Effects: None    Past diabetic medications include:  Does not recall     Glucose Readings:  Glucometer  Patient tests BG 1-2 times per day    Current home BG readings (mg/dL):   AM FBG avg 130-150  Evening 120s avg, lowest 94. Highest 174    Previous home BG readings (mg/dL): AM ,137    Any episodes of hypoglycemia? No  Did patient treat episode of hypoglycemia appropriately? N/A  Does the patient have a prescription for ready-to-use Glucagon? Not on insulin    Lifestyle:  Diet: 2 large meals/day.   BK: nothing or yogurt and piece of bread   LN: skips   DN: Carb dense; meat veg and starch  Snacks: None  Drinks: Crystal light water (zero sugar), coffee 2-3 cups in AM  Exercise: does not exercise  Activity type: Type of  Exercise : walking 4 K steps per day around her house daily   Is limited by: back pain and joint pain  Tobacco history: Denies   Alcohol use: Denies     Secondary Prevention:  Statin? Yes  ACE-I/ARB? Yes (Entresto)  Aspirin? Yes    Pertinent PMH Review:  PMH of Pancreatitis: No  PMH of Retinopathy: No  PMH of Urinary Tract Infections: No  PMH of MTC: No  PMH of MEN2: No  UACR/EGFR in last year?: Yes  POC ALBUMIN /CREATININE RATIO MANUALLY ENTERED   Date Value Ref Range Status   05/14/2024 <30 <30 ug/mg Creat Final   10/10/2023 30 - 300 (A) <30 ug/mg Creat Final     Albumin/Creatine Ratio   Date Value Ref Range Status   01/27/2022 SEE COMMENT 0.0 - 30.0 ug/mg crt Final     Comment:     One or more analytes used in this calculation   is outside of the analytical measurement range.  Calculation cannot be performed.       Drug Interactions  No relevant drug interactions were noted.    Medication System Management  Patient's preferred pharmacy: [facility]   Adherence/Organization: No barriers   Affordability/Accessibility:  Patient Assistance for Mounjaro and Jardiance approved through 2/24/2026. Will have to be renewed prior to that date to prevent lapse in coverage. Medication(s) will be received at no cost to patient from [facility].     Objective   Allergies   Allergen Reactions    Shellfish Containing Products Anaphylaxis    Codeine Nausea And Vomiting and Other     GI Intolerance    Tramadol Other and Itching     tongue blistered    Celecoxib Rash     Social History     Social History Narrative    Not on file      Medication Review  Current Outpatient Medications   Medication Instructions    albuterol 90 mcg/actuation inhaler 1 puff, Every 4 hours PRN    aspirin 81 mg capsule 1 tablet, Daily    atorvastatin (LIPITOR) 40 mg, oral, Daily    biotin 10 mg tablet 1 tablet, Daily    blood sugar diagnostic (Truetrack Test) strip TEST twice a day    calcium carbonate-vitamin D3 600 mg-20 mcg (800 unit) tablet  Caltrate with Vitamin D3 600 mg (1,500 mg)-800 unit tablet    coenzyme Q-10 200 mg capsule 1 capsule, Daily    cyanocobalamin (VITAMIN B-12) 250 mcg, Daily    escitalopram (LEXAPRO) 20 mg, oral, Daily    ferrous sulfate 325 (65 Fe) MG tablet 65 mg of elemental iron, 2 times daily    fexofenadine (Allegra) 180 mg tablet 1 tablet, Daily    fluticasone (Flonase) 50 mcg/actuation nasal spray 1 spray, Daily    folic acid (FOLVITE) 1 mg, Daily    furosemide (LASIX) 20 mg, oral, Daily    gabapentin (NEURONTIN) 100 mg, 2 times daily    gabapentin (NEURONTIN) 300 mg, oral, 2 times daily    hydroxychloroquine (Plaquenil) 200 mg tablet oral, 2 times daily    Jardiance 10 mg, oral, Daily    levothyroxine (SYNTHROID, LEVOXYL) 100 mcg, oral, Daily    magnesium oxide (Mag-Ox) 400 mg tablet 2 tablets, Daily    meloxicam (MOBIC) 7.5 mg, oral, Daily    metFORMIN (GLUCOPHAGE) 1,000 mg, oral, 2 times daily (morning and late afternoon)    methocarbamol (Robaxin) 500 mg tablet Take 1-2 tabs every 8 hours as needed for spasms    metoprolol succinate XL (TOPROL-XL) 100 mg, oral, Daily    Mounjaro 10 mg, subcutaneous, Weekly    multivit/folic acid/vit K1 (WOMEN'S 50 PLUS ADVANCED ORAL) 1 tablet, Daily    pantoprazole (PROTONIX) 40 mg, oral, Daily    prednisoLONE acetate (Pred-Forte) 1 % ophthalmic suspension INSTILL 1 DROPS INTO THE RIGHT EYE FOUR TIMES DAILY FOR 1 WEEK, THEN 1 DROP THREE TIMES DAILY FOR 1 WEEK, THEN 1 DROP TWICE DAILY FOR 1 WEEK, THEN 1 DROP ONCE DAILY FOR 1 WEEK, THEN STOP - PATIENT SHOULD START ON APRIL 25, 2025    psyllium (Metamucil) 3.4 gram packet 1 packet, Daily    sacubitriL-valsartan (Entresto) 24-26 mg tablet 1 tablet, oral, 2 times daily      Vitals  BP Readings from Last 2 Encounters:   06/30/25 132/68   03/18/25 136/78     BMI Readings from Last 1 Encounters:   06/30/25 36.67 kg/m²      Labs  Lab Results   Component Value Date    HGBA1C 7.6 (A) 06/30/2025    HGBA1C 10.5 (A) 03/18/2025    HGBA1C 8.2 (A)  12/03/2024     Lab Results   Component Value Date    CALCIUM 9.5 08/23/2024     08/23/2024    K 4.3 08/23/2024    CO2 26 08/23/2024     08/23/2024    BUN 30 (H) 08/23/2024    CREATININE 0.93 08/23/2024    EGFR 65 08/23/2024     Lab Results   Component Value Date    ALT 14 08/23/2024    AST 15 08/23/2024    ALKPHOS 49 08/23/2024    BILITOT 0.5 08/23/2024     Lab Results   Component Value Date    TRIG 479 (H) 08/23/2024    CHOL 163 08/23/2024    LDLF 46 01/26/2022    LDLCALC  08/23/2024      Comment:      The calculation of LDL and VLDL are inaccurate when the Triglycerides are greater than 400 mg/dL or when the patient is non-fasting. If LDL measurement is necessary contact the testing laboratory for an alternative LDL assay.                                  Near   Borderline      AGE      Desirable  Optimal    High     High     Very High     0-19 Y     0 - 109     ---    110-129   >/= 130     ----    20-24 Y     0 - 119     ---    120-159   >/= 160     ----      >24 Y     0 -  99   100-129  130-159   160-189     >/=190      HDL 35.8 08/23/2024     Lab Results   Component Value Date    MICROALBCREA <30 05/14/2024     Wt Readings from Last 3 Encounters:   06/30/25 93.9 kg (207 lb)   03/18/25 98.3 kg (216 lb 11.2 oz)   02/05/25 97.5 kg (215 lb)      There is no height or weight on file to calculate BMI.     Assessment/Plan   Problem List Items Addressed This Visit       Controlled type 2 diabetes mellitus with complication, without long-term current use of insulin (Multi) - Primary    Relevant Medications    tirzepatide (Mounjaro) 10 mg/0.5 mL pen injector    Other Relevant Orders    Referral to Clinical Pharmacy       Patient's goal A1c is < 7.5%.  Is pt at goal? No, 7.6%  Patient's SMBGs are steadily improving.     Medication Changes:  CONTINUE  Metformin 1000mg twice daily, Jardiance 10mg daily   INCREASE  Mounjaro to 10mg once weekly   Additional month on current dose due to continued effect on appetite  and glycemic control   BP stable. Heart rate in normal range however ~ 20-30 points higher from baseline since starting Mounjaro in April. Continue to monitor and follow up with cardio if palpitations or dizziness develop.    Future Considerations:  Patients goal is to reduce pill burden. Pending adequate glycemic control and tolerance with GLP1a dose escalations will aim to stop metformin as able.    Monitoring and Education:  Counseled patient on Mounjaro MOA, expectations, side effects, duration of therapy, administration, and monitoring parameters.  Provided detailed dosing and administration counseling to ensure proper technique.   Reviewed Mounjaro titration schedule, starting with 2.5 mg once weekly to a goal of 15 mg once weekly if tolerated  Counseled patient on the benefits of GLP-1ra glycemic control and weight loss  Reviewed storage requirements of Mounjaro when not in use, and when to administer the medication if a dose is missed.  Advised patient that they may experience improved satiety after meals and portion sizes of meals may be reduced as doses of Mounjaro increase.     Clinical Pharmacist follow-up:  9/8 0920, Telehealth visit    Continue all meds under the continuation of care with the referring provider and clinical pharmacy team.    Thank you,  Alyssa Munoz, PharmD  Clinical Pharmacy Specialist Primary Care  [phone]     Verbal consent to manage patient's drug therapy was obtained from the patient. They were informed they may decline to participate or withdraw from participation in pharmacy services at any time.

## 2025-08-07 ENCOUNTER — APPOINTMENT (OUTPATIENT)
Dept: PRIMARY CARE | Facility: CLINIC | Age: 75
End: 2025-08-07
Payer: MEDICARE

## 2025-08-12 ENCOUNTER — APPOINTMENT (OUTPATIENT)
Dept: PRIMARY CARE | Facility: CLINIC | Age: 75
End: 2025-08-12
Payer: MEDICARE

## 2025-08-19 PROCEDURE — RXMED WILLOW AMBULATORY MEDICATION CHARGE

## 2025-08-20 ENCOUNTER — APPOINTMENT (OUTPATIENT)
Dept: CARDIOLOGY | Facility: CLINIC | Age: 75
End: 2025-08-20
Payer: MEDICARE

## 2025-08-21 ENCOUNTER — PHARMACY VISIT (OUTPATIENT)
Dept: PHARMACY | Facility: CLINIC | Age: 75
End: 2025-08-21
Payer: COMMERCIAL

## 2025-08-25 ENCOUNTER — HOSPITAL ENCOUNTER (OUTPATIENT)
Dept: CARDIOLOGY | Facility: CLINIC | Age: 75
Discharge: HOME | End: 2025-08-25
Payer: MEDICARE

## 2025-08-25 DIAGNOSIS — I49.02 VENTRICULAR FLUTTER (MULTI): ICD-10-CM

## 2025-08-25 PROCEDURE — 93295 DEV INTERROG REMOTE 1/2/MLT: CPT | Performed by: STUDENT IN AN ORGANIZED HEALTH CARE EDUCATION/TRAINING PROGRAM

## 2025-08-25 PROCEDURE — 93296 REM INTERROG EVL PM/IDS: CPT

## 2025-08-28 ENCOUNTER — OFFICE VISIT (OUTPATIENT)
Dept: CARDIOLOGY | Facility: CLINIC | Age: 75
End: 2025-08-28
Payer: MEDICARE

## 2025-08-28 ENCOUNTER — HOSPITAL ENCOUNTER (OUTPATIENT)
Dept: CARDIOLOGY | Facility: CLINIC | Age: 75
Discharge: HOME | End: 2025-08-28
Payer: MEDICARE

## 2025-08-28 VITALS
BODY MASS INDEX: 35.79 KG/M2 | WEIGHT: 202 LBS | SYSTOLIC BLOOD PRESSURE: 107 MMHG | HEIGHT: 63 IN | DIASTOLIC BLOOD PRESSURE: 65 MMHG | OXYGEN SATURATION: 98 % | HEART RATE: 86 BPM

## 2025-08-28 DIAGNOSIS — R93.3 ABNORMAL FINDINGS ON DIAGNOSTIC IMAGING OF OTHER PARTS OF DIGESTIVE TRACT: ICD-10-CM

## 2025-08-28 DIAGNOSIS — R94.31 ABNORMAL ELECTROCARDIOGRAM (ECG) (EKG): Primary | ICD-10-CM

## 2025-08-28 DIAGNOSIS — I50.9 CONGESTIVE HEART FAILURE, UNSPECIFIED HF CHRONICITY, UNSPECIFIED HEART FAILURE TYPE: ICD-10-CM

## 2025-08-28 DIAGNOSIS — R94.31 ABNORMAL ELECTROCARDIOGRAM (ECG) (EKG): ICD-10-CM

## 2025-08-28 DIAGNOSIS — R93.89 ABNORMAL FINDINGS ON DIAGNOSTIC IMAGING OF OTHER SPECIFIED BODY STRUCTURES: ICD-10-CM

## 2025-08-28 DIAGNOSIS — I42.9 CARDIOMYOPATHY, UNSPECIFIED: ICD-10-CM

## 2025-08-28 LAB
AORTIC VALVE PEAK VELOCITY: 1.43 M/S
AV PEAK GRADIENT: 8 MMHG
AVA (PEAK VEL): 2.27 CM2
EJECTION FRACTION APICAL 4 CHAMBER: 55.1
EJECTION FRACTION: 53 %
LEFT ATRIUM VOLUME AREA LENGTH INDEX BSA: 32.7 ML/M2
LEFT VENTRICLE INTERNAL DIMENSION DIASTOLE: 5.21 CM (ref 3.5–6)
LEFT VENTRICULAR OUTFLOW TRACT DIAMETER: 2.3 CM
MITRAL VALVE E/A RATIO: 0.67
MITRAL VALVE E/E' RATIO: 12.73
RIGHT VENTRICLE FREE WALL PEAK S': 13.69 CM/S
RIGHT VENTRICLE PEAK SYSTOLIC PRESSURE: 24 MMHG
TRICUSPID ANNULAR PLANE SYSTOLIC EXCURSION: 2.1 CM

## 2025-08-28 PROCEDURE — 1159F MED LIST DOCD IN RCRD: CPT | Performed by: NURSE PRACTITIONER

## 2025-08-28 PROCEDURE — 99214 OFFICE O/P EST MOD 30 MIN: CPT | Performed by: NURSE PRACTITIONER

## 2025-08-28 PROCEDURE — 1036F TOBACCO NON-USER: CPT | Performed by: NURSE PRACTITIONER

## 2025-08-28 PROCEDURE — 99212 OFFICE O/P EST SF 10 MIN: CPT | Mod: 25

## 2025-08-28 PROCEDURE — 3074F SYST BP LT 130 MM HG: CPT | Performed by: NURSE PRACTITIONER

## 2025-08-28 PROCEDURE — 93306 TTE W/DOPPLER COMPLETE: CPT | Performed by: INTERNAL MEDICINE

## 2025-08-28 PROCEDURE — 3078F DIAST BP <80 MM HG: CPT | Performed by: NURSE PRACTITIONER

## 2025-08-28 PROCEDURE — 1160F RVW MEDS BY RX/DR IN RCRD: CPT | Performed by: NURSE PRACTITIONER

## 2025-08-28 PROCEDURE — G2211 COMPLEX E/M VISIT ADD ON: HCPCS | Performed by: NURSE PRACTITIONER

## 2025-08-28 PROCEDURE — 93306 TTE W/DOPPLER COMPLETE: CPT

## 2025-08-28 PROCEDURE — 3051F HG A1C>EQUAL 7.0%<8.0%: CPT | Performed by: NURSE PRACTITIONER

## 2025-08-28 ASSESSMENT — ENCOUNTER SYMPTOMS
CARDIOVASCULAR NEGATIVE: 1
RESPIRATORY NEGATIVE: 1
MUSCULOSKELETAL NEGATIVE: 1
GASTROINTESTINAL NEGATIVE: 1
NEUROLOGICAL NEGATIVE: 1
CONSTITUTIONAL NEGATIVE: 1

## 2025-08-28 ASSESSMENT — PATIENT HEALTH QUESTIONNAIRE - PHQ9
1. LITTLE INTEREST OR PLEASURE IN DOING THINGS: NOT AT ALL
2. FEELING DOWN, DEPRESSED OR HOPELESS: NOT AT ALL
1. LITTLE INTEREST OR PLEASURE IN DOING THINGS: NOT AT ALL
SUM OF ALL RESPONSES TO PHQ9 QUESTIONS 1 AND 2: 0
2. FEELING DOWN, DEPRESSED OR HOPELESS: NOT AT ALL
SUM OF ALL RESPONSES TO PHQ9 QUESTIONS 1 AND 2: 0

## 2025-09-08 ENCOUNTER — APPOINTMENT (OUTPATIENT)
Dept: PHARMACY | Facility: HOSPITAL | Age: 75
End: 2025-09-08
Payer: MEDICARE

## 2025-09-11 ENCOUNTER — APPOINTMENT (OUTPATIENT)
Dept: PRIMARY CARE | Facility: CLINIC | Age: 75
End: 2025-09-11
Payer: MEDICARE

## 2025-09-15 ENCOUNTER — APPOINTMENT (OUTPATIENT)
Dept: PRIMARY CARE | Facility: CLINIC | Age: 75
End: 2025-09-15
Payer: MEDICARE

## 2025-09-18 ENCOUNTER — APPOINTMENT (OUTPATIENT)
Dept: PHARMACY | Facility: HOSPITAL | Age: 75
End: 2025-09-18
Payer: MEDICARE

## 2025-10-06 ENCOUNTER — APPOINTMENT (OUTPATIENT)
Dept: PRIMARY CARE | Facility: CLINIC | Age: 75
End: 2025-10-06
Payer: MEDICARE

## (undated) DEVICE — DRESSING, GAUZE, 16 PLY, 4 X 4 IN, STERILE

## (undated) DEVICE — MANIFOLD, 4 PORT NEPTUNE STANDARD

## (undated) DEVICE — REST, HEAD, BAGEL, 9 IN

## (undated) DEVICE — DRAPE, MICROSCOPE, FOR ZEISS 65MM, VARI-LENS II, 52 X 150

## (undated) DEVICE — SKIN CLOSURE SYS, PREMIERPRO EXOFIN, 1-4CM X 22CM, 1.75G TUBE

## (undated) DEVICE — DRESSING, SPONGE, GAUZE, CURITY, 4 X 4 IN, STERILE

## (undated) DEVICE — APPLICATOR, PREP, CHLORAPREP, W/ORANGE TINT, 10.5ML

## (undated) DEVICE — Device

## (undated) DEVICE — DRAIN, WOUND, FLAT, HUBLESS, FULL LENGTH PERFORATION, 7 MM X 20 CM

## (undated) DEVICE — COVER, CART, 45 X 27 X 48 IN, CLEAR

## (undated) DEVICE — SUTURE, VICRYL, 3-0, 18 IN, PS2, UNDYED

## (undated) DEVICE — SPONGE, DISSECTOR, PEANUT, 3/8, STERILE 5 FOAM HOLDER"

## (undated) DEVICE — DRESSING, TRANSPARENT, TEGADERM, 2-3/8 X 2-3/4 IN

## (undated) DEVICE — SEALANT, HEMOSTATIC, FLOSEAL, 10 ML

## (undated) DEVICE — ELECTRODE, ELECTROSURGICAL, BLADE, INSULATED, ENT/IMA, STERILE

## (undated) DEVICE — EVACUATOR, WOUND, SUCTION, CLOSED, JACKSON-PRATT, 100 CC, SILICONE

## (undated) DEVICE — COVER, TABLE, UHC

## (undated) DEVICE — DRILL, NEURO, PRECISION, 3MM X 3.8MM, CARBIDE